# Patient Record
Sex: FEMALE | Race: BLACK OR AFRICAN AMERICAN | NOT HISPANIC OR LATINO | Employment: FULL TIME | URBAN - NONMETROPOLITAN AREA
[De-identification: names, ages, dates, MRNs, and addresses within clinical notes are randomized per-mention and may not be internally consistent; named-entity substitution may affect disease eponyms.]

---

## 2022-07-18 ENCOUNTER — OFFICE VISIT (OUTPATIENT)
Dept: CARDIOLOGY | Facility: CLINIC | Age: 37
End: 2022-07-18

## 2022-07-18 VITALS
DIASTOLIC BLOOD PRESSURE: 80 MMHG | WEIGHT: 263.6 LBS | HEART RATE: 60 BPM | SYSTOLIC BLOOD PRESSURE: 110 MMHG | HEIGHT: 63 IN | BODY MASS INDEX: 46.71 KG/M2

## 2022-07-18 DIAGNOSIS — Z79.01 LONG TERM (CURRENT) USE OF ANTICOAGULANTS: ICD-10-CM

## 2022-07-18 DIAGNOSIS — I82.4Y2 DEEP VEIN THROMBOSIS (DVT) OF PROXIMAL VEIN OF LEFT LOWER EXTREMITY, UNSPECIFIED CHRONICITY: Primary | ICD-10-CM

## 2022-07-18 DIAGNOSIS — R01.1 HEART MURMUR: ICD-10-CM

## 2022-07-18 DIAGNOSIS — I83.812 VARICOSE VEINS OF LEFT LOWER EXTREMITY WITH PAIN: ICD-10-CM

## 2022-07-18 DIAGNOSIS — I82.5Y2 CHRONIC DEEP VEIN THROMBOSIS (DVT) OF PROXIMAL VEIN OF LEFT LOWER EXTREMITY: ICD-10-CM

## 2022-07-18 DIAGNOSIS — G43.009 MIGRAINE WITHOUT AURA AND WITHOUT STATUS MIGRAINOSUS, NOT INTRACTABLE: ICD-10-CM

## 2022-07-18 DIAGNOSIS — I87.2 VENOUS INSUFFICIENCY: ICD-10-CM

## 2022-07-18 PROBLEM — I82.509 CHRONIC DEEP VEIN THROMBOSIS (DVT): Status: ACTIVE | Noted: 2022-07-18

## 2022-07-18 PROBLEM — I82.509 CHRONIC DEEP VEIN THROMBOSIS (DVT): Status: RESOLVED | Noted: 2022-07-18 | Resolved: 2022-07-18

## 2022-07-18 PROCEDURE — 99204 OFFICE O/P NEW MOD 45 MIN: CPT | Performed by: NURSE PRACTITIONER

## 2022-07-18 PROCEDURE — 93000 ELECTROCARDIOGRAM COMPLETE: CPT | Performed by: NURSE PRACTITIONER

## 2022-07-18 RX ORDER — HYDROXYZINE HYDROCHLORIDE 25 MG/1
25 TABLET, FILM COATED ORAL 3 TIMES DAILY PRN
COMMUNITY
End: 2022-12-14 | Stop reason: SDUPTHER

## 2022-07-18 RX ORDER — CHOLECALCIFEROL (VITAMIN D3) 125 MCG
CAPSULE ORAL DAILY
COMMUNITY
End: 2023-01-24

## 2022-07-18 RX ORDER — METRONIDAZOLE 10 MG/G
GEL TOPICAL 2 TIMES DAILY
COMMUNITY

## 2022-07-18 RX ORDER — IBUPROFEN 800 MG/1
800 TABLET ORAL 2 TIMES DAILY PRN
COMMUNITY

## 2022-07-18 RX ORDER — LORATADINE 10 MG/1
10 TABLET ORAL DAILY
COMMUNITY

## 2022-07-18 RX ORDER — TRETINOIN 0.25 MG/G
GEL TOPICAL NIGHTLY
COMMUNITY

## 2022-07-18 RX ORDER — TOPIRAMATE 25 MG/1
25 TABLET ORAL DAILY
COMMUNITY

## 2022-07-18 RX ORDER — ESCITALOPRAM OXALATE 10 MG/1
10 TABLET ORAL DAILY
COMMUNITY

## 2022-07-18 RX ORDER — CYCLOBENZAPRINE HCL 10 MG
10 TABLET ORAL 2 TIMES DAILY PRN
COMMUNITY

## 2022-07-18 NOTE — PROGRESS NOTES
"Subjective     Chief Complaint   Patient presents with   • Establish Care     Patient referred per PCP for hx of DVT on Eliquis. No prior cardiac testing.   • DVT     Has hx of DVT in left leg after having partial hysterectomy.   • Edema     Has occasional in legs, has hx of varicose veins. She reports having treatment in 2014.   • Migraines     Has increased over the last few years. She is currently taking Topiramate.     Initial cardiac evaluation;    [facility]  Beverly Giron is a 37-year-old female with history of varicose veins, venous insufficiency, significant LE edema with pregnancies (x5) who is referred for cardiac evaluation after developing thrombus of the greater saphenous vein after undergoing hysterectomy in February 2021.  She was treated with Eliquis with interval ultrasounds showing thrombus resolved on 5/23/2022.  She wants to know whether Eliquis can be discontinued.    Past history positive for vein stripping when she lived in [place].  Data deficient.  Complains of \"terrible looking legs, varicose veins\".  No previous history of DVT.  Unclear whether clotting disorder in family members but her grandmother did have DVT.  She denies chest pain, dyspnea, orthopnea, palpitations, TIA.  She complains of weight gain, edema, fatigue.  She has used compression socks in the past but not currently.  She works from home.  She has chronic migraine headache relieved with Topamax.  She complains of progressive regular weight gain over the last 5 years.    Family history positive for HTN, MI, heart disease in mother, aunt, uncle, grandmother.  She quit smoking 4 months ago.  Rarely drinks alcohol.    Cardiac History  Past Surgical History:   Procedure Laterality Date   • US VENOUS EXTREMITY UNILATERAL  05/23/2022    Normal, no DVT   • US VENOUS EXTREMITY UNILATERAL  02/10/2022    Partially occlusive superficial thrombophlebitis greater saphenous vein   • US VENOUS EXTREMITY UNILATERAL  09/29/2021    Small " amount residual thrombus greater saphenous vein   • US VENOUS EXTREMITY UNILATERAL  2021    Superficial thrombosis greater saphenous vein   • US VENOUS EXTREMITY UNILATERAL  03/10/2021    Occlusive extensive thrombus within the greater saphenous vein from upper calf throughout the thigh     Current Outpatient Medications   Medication Sig Dispense Refill   • apixaban (ELIQUIS) 5 MG tablet tablet Take 5 mg by mouth Daily.     • Cholecalciferol (Vitamin D3) 50 MCG (2000 UT) tablet Take  by mouth Daily.     • cyclobenzaprine (FLEXERIL) 10 MG tablet Take 10 mg by mouth 2 (Two) Times a Day As Needed for Muscle Spasms.     • escitalopram (LEXAPRO) 10 MG tablet Take 10 mg by mouth Daily.     • hydrOXYzine (ATARAX) 25 MG tablet Take 25 mg by mouth 3 (Three) Times a Day As Needed for Itching.     • ibuprofen (ADVIL,MOTRIN) 800 MG tablet Take 800 mg by mouth 2 (Two) Times a Day As Needed for Mild Pain .     • loratadine (CLARITIN) 10 MG tablet Take 10 mg by mouth Daily.     • metroNIDAZOLE (METROGEL) 1 % gel Apply  topically to the appropriate area as directed 2 (Two) Times a Day.     • topiramate (TOPAMAX) 25 MG tablet Take 25 mg by mouth Daily.     • tretinoin (Retin-A) 0.025 % gel Apply  topically to the appropriate area as directed Every Night.       No current facility-administered medications for this visit.     Patient has no known allergies.    Past Medical History:   Diagnosis Date   • Clotting disorder (HCC)    • Deep vein thrombosis (HCC)    • History of partial hysterectomy    • Hx of eye surgery    • Hx of migraines    • Hx of ovarian cyst      Social History     Socioeconomic History   • Marital status:    Tobacco Use   • Smoking status: Former Smoker     Packs/day: 1.00     Years: 10.00     Pack years: 10.00     Types: Cigarettes     Quit date: 2022     Years since quittin.2   • Smokeless tobacco: Never Used   • Tobacco comment: she had stopped in 2017 for a few years then restarted  "10/2021. Has stopped again 4/2022.   Vaping Use   • Vaping Use: Never used   Substance and Sexual Activity   • Alcohol use: Yes     Comment: occasionally, about once monthly   • Drug use: Never   • Sexual activity: Defer     Family History   Problem Relation Age of Onset   • Hypertension Mother    • COPD Mother    • Scoliosis Sister    • No Known Problems Sister    • Heart attack Maternal Aunt 60   • Hypertension Maternal Aunt    • Hypertension Maternal Aunt    • Heart attack Maternal Uncle 59   • Heart attack Maternal Grandmother    • Heart disease Maternal Grandmother    • Cancer Maternal Grandmother    • Liver disease Maternal Grandfather    • Seizures Maternal Grandfather    • No Known Problems Daughter    • No Known Problems Daughter    • No Known Problems Daughter    • No Known Problems Son    • No Known Problems Son      Review of Systems   Constitutional: Positive for fatigue and unexpected weight change (Weight is increasing).   HENT: Negative.    Eyes: Negative.    Respiratory: Negative.    Cardiovascular: Positive for leg swelling.   Gastrointestinal: Negative.    Endocrine: Negative.    Genitourinary: Negative.    Musculoskeletal: Negative.    Skin: Negative.    Allergic/Immunologic: Negative.    Hematological: Negative.    Psychiatric/Behavioral: Negative.        Diabetes- No  Thyroid- normal    Objective     /80 (BP Location: Right arm)   Pulse 60   Ht 160 cm (63\")   Wt 120 kg (263 lb 9.6 oz)   BMI 46.69 kg/m²     Labs 5/6/2022: A1c 5.3%, H/H13.5/40, platelets 319, vitamin D 29, TSH 1.24, total cholesterol 158, TRI 46, HDL 44, , CMP normal    Physical Exam  Vitals and nursing note reviewed.   Constitutional:       Appearance: Normal appearance. She is obese.   HENT:      Head: Normocephalic and atraumatic.   Eyes:      General: No scleral icterus.     Extraocular Movements: Extraocular movements intact.      Pupils: Pupils are equal, round, and reactive to light.   Neck:      Vascular: " No carotid bruit.   Cardiovascular:      Rate and Rhythm: Normal rate and regular rhythm.   Pulmonary:      Effort: Pulmonary effort is normal.      Breath sounds: Normal breath sounds.   Abdominal:      General: Bowel sounds are normal.      Palpations: Abdomen is soft.   Musculoskeletal:         General: Normal range of motion.      Cervical back: Normal range of motion and neck supple.      Right lower leg: Edema (Trace) present.      Left lower leg: Edema (Trace) present.   Skin:     General: Skin is warm and dry.      Capillary Refill: Capillary refill takes less than 2 seconds.   Neurological:      General: No focal deficit present.      Mental Status: She is alert and oriented to person, place, and time.   Psychiatric:         Mood and Affect: Mood normal.         Behavior: Behavior normal.         ECG 12 Lead    Date/Time: 7/18/2022 11:31 AM  Performed by: Livia Salvador APRN  Authorized by: Livia Salvador APRN   Comparison: not compared with previous ECG   Rhythm: sinus rhythm  Rate: normal  BPM: 60  ST Segments: ST segments normal    Clinical impression: normal ECG  Comments:  ms  QRS 97 ms   ms          Assessment & Plan     Diagnoses and all orders for this visit:    1. Acute deep vein thrombosis (DVT) of proximal vein of left lower extremity (HCC) (Primary)  -     Adult Transthoracic Echo Complete W/ Cont if Necessary Per Protocol; Future    2. Long term (current) use of anticoagulants  -     Ambulatory Referral to Hematology  -     Adult Transthoracic Echo Complete W/ Cont if Necessary Per Protocol; Future    3. Migraine without aura and without status migrainosus, not intractable  -     Adult Transthoracic Echo Complete W/ Cont if Necessary Per Protocol; Future    4. Venous insufficiency  -     Adult Transthoracic Echo Complete W/ Cont if Necessary Per Protocol; Future    5. Chronic deep vein thrombosis (DVT) of proximal vein of left lower extremity (HCC)  -     Ambulatory Referral to  Hematology  -     Adult Transthoracic Echo Complete W/ Cont if Necessary Per Protocol; Future    6. Heart murmur  -     Adult Transthoracic Echo Complete W/ Cont if Necessary Per Protocol; Future    Other orders  -     ECG 12 Lead       Clinical exam reveals normal S1, S2, soft holosystolic murmur at the mitral area 1-2/6.  EKG showed sinus rhythm at 60 bpm, trace LE edema, LCTA bilaterally.  Blood pressure normal.  BMI elevated at 46.  Labs reviewed showing normal glucose, normal lipids.    Chronic venous insufficiency/varicose veins-patient will be referred to Dr. Gian Anderson for evaluation of venous insufficiency and history of vein stripping with recent thrombus.  Compression and elevation advised.  Information sheet on minimizing venous pressure given.  Weight loss.  Low-sodium diet.    Questionable clotting disorder- patient will be referred to Dr. Hall/hematology to rule out genetic clotting disorder with personal history and family history of DVT.    DVT-resolved after 15 months of Eliquis therapy.  Will continue Eliquis until she is seen by hematology.  She appears to be taking 5 mg once daily, unsure why dose was reduced.    Murmur/LE edema- echocardiogram to evaluate LVEF, mitral and aortic valves, PA pressure.  Bubble study will be done with history of chronic migraine headache.    Advised to continue Eliquis until she is evaluated by hematology.    Mediterranean diet sheet given.  Further recommendations to follow echo.  No changes made today.

## 2022-07-26 ENCOUNTER — TELEPHONE (OUTPATIENT)
Dept: CARDIOLOGY | Facility: CLINIC | Age: 37
End: 2022-07-26

## 2022-08-02 ENCOUNTER — HOSPITAL ENCOUNTER (OUTPATIENT)
Dept: CARDIOLOGY | Facility: HOSPITAL | Age: 37
Discharge: HOME | End: 2022-08-02

## 2022-08-02 DIAGNOSIS — I82.4Y2 DEEP VEIN THROMBOSIS (DVT) OF PROXIMAL VEIN OF LEFT LOWER EXTREMITY, UNSPECIFIED CHRONICITY: ICD-10-CM

## 2022-08-02 DIAGNOSIS — I87.2 VENOUS INSUFFICIENCY: ICD-10-CM

## 2022-08-02 DIAGNOSIS — G43.009 MIGRAINE WITHOUT AURA AND WITHOUT STATUS MIGRAINOSUS, NOT INTRACTABLE: ICD-10-CM

## 2022-08-02 DIAGNOSIS — I82.5Y2 CHRONIC DEEP VEIN THROMBOSIS (DVT) OF PROXIMAL VEIN OF LEFT LOWER EXTREMITY: ICD-10-CM

## 2022-08-02 DIAGNOSIS — Z79.01 LONG TERM (CURRENT) USE OF ANTICOAGULANTS: ICD-10-CM

## 2022-08-02 DIAGNOSIS — R01.1 HEART MURMUR: ICD-10-CM

## 2022-08-02 LAB
BH CV ECHO MEAS - ACS: 2.18 CM
BH CV ECHO MEAS - AO MAX PG: 6.8 MMHG
BH CV ECHO MEAS - AO MEAN PG: 4 MMHG
BH CV ECHO MEAS - AO ROOT DIAM: 2.9 CM
BH CV ECHO MEAS - AO V2 MAX: 130.3 CM/SEC
BH CV ECHO MEAS - AO V2 VTI: 32.6 CM
BH CV ECHO MEAS - EDV(CUBED): 85.8 ML
BH CV ECHO MEAS - EDV(MOD-SP4): 85.3 ML
BH CV ECHO MEAS - EF(MOD-SP4): 55.6 %
BH CV ECHO MEAS - EF_3D-VOL: 64 %
BH CV ECHO MEAS - ESV(CUBED): 15.8 ML
BH CV ECHO MEAS - ESV(MOD-SP4): 37.9 ML
BH CV ECHO MEAS - FS: 43.1 %
BH CV ECHO MEAS - IVS/LVPW: 1.11 CM
BH CV ECHO MEAS - IVSD: 1.17 CM
BH CV ECHO MEAS - LA DIMENSION: 4.2 CM
BH CV ECHO MEAS - LAT PEAK E' VEL: 10.2 CM/SEC
BH CV ECHO MEAS - LV DIASTOLIC VOL/BSA (35-75): 39.3 CM2
BH CV ECHO MEAS - LV MASS(C)D: 171.7 GRAMS
BH CV ECHO MEAS - LV SYSTOLIC VOL/BSA (12-30): 17.4 CM2
BH CV ECHO MEAS - LVIDD: 4.4 CM
BH CV ECHO MEAS - LVIDS: 2.5 CM
BH CV ECHO MEAS - LVOT AREA: 4.1 CM2
BH CV ECHO MEAS - LVOT DIAM: 2.28 CM
BH CV ECHO MEAS - LVPWD: 1.05 CM
BH CV ECHO MEAS - MED PEAK E' VEL: 8.7 CM/SEC
BH CV ECHO MEAS - MV A MAX VEL: 57.4 CM/SEC
BH CV ECHO MEAS - MV DEC SLOPE: 461.8 CM/SEC2
BH CV ECHO MEAS - MV E MAX VEL: 94.7 CM/SEC
BH CV ECHO MEAS - MV E/A: 1.65
BH CV ECHO MEAS - RVDD: 3.1 CM
BH CV ECHO MEAS - SI(MOD-SP4): 21.8 ML/M2
BH CV ECHO MEAS - SV(MOD-SP4): 47.4 ML
BH CV ECHO MEASUREMENTS AVERAGE E/E' RATIO: 10.02
LEFT ATRIUM VOLUME INDEX: 19.2 ML/M2
MAXIMAL PREDICTED HEART RATE: 185 BPM
STRESS TARGET HR: 157 BPM

## 2022-08-02 PROCEDURE — 93306 TTE W/DOPPLER COMPLETE: CPT

## 2022-08-02 PROCEDURE — 93306 TTE W/DOPPLER COMPLETE: CPT | Performed by: INTERNAL MEDICINE

## 2022-12-14 ENCOUNTER — OFFICE VISIT (OUTPATIENT)
Dept: BEHAVIORAL HEALTH | Facility: CLINIC | Age: 37
End: 2022-12-14

## 2022-12-14 ENCOUNTER — OFFICE VISIT (OUTPATIENT)
Dept: BARIATRICS/WEIGHT MGMT | Facility: CLINIC | Age: 37
End: 2022-12-14

## 2022-12-14 VITALS
BODY MASS INDEX: 45.55 KG/M2 | RESPIRATION RATE: 16 BRPM | HEIGHT: 65 IN | OXYGEN SATURATION: 99 % | HEART RATE: 98 BPM | WEIGHT: 273.4 LBS | TEMPERATURE: 97.7 F | SYSTOLIC BLOOD PRESSURE: 126 MMHG | DIASTOLIC BLOOD PRESSURE: 80 MMHG

## 2022-12-14 DIAGNOSIS — R53.83 FATIGUE, UNSPECIFIED TYPE: ICD-10-CM

## 2022-12-14 DIAGNOSIS — F41.9 RECURRENT ANXIETY: ICD-10-CM

## 2022-12-14 DIAGNOSIS — Z79.01 LONG TERM (CURRENT) USE OF ANTICOAGULANTS: ICD-10-CM

## 2022-12-14 DIAGNOSIS — Z71.89 ENCOUNTER FOR PSYCHOLOGICAL ASSESSMENT PRIOR TO BARIATRIC SURGERY: ICD-10-CM

## 2022-12-14 DIAGNOSIS — Z87.891 FORMER SMOKER: ICD-10-CM

## 2022-12-14 DIAGNOSIS — E66.01 MORBID OBESITY WITH BMI OF 45.0-49.9, ADULT: Primary | ICD-10-CM

## 2022-12-14 DIAGNOSIS — R12 HEARTBURN: ICD-10-CM

## 2022-12-14 DIAGNOSIS — E66.01 OBESITY, CLASS III, BMI 40-49.9 (MORBID OBESITY): Primary | ICD-10-CM

## 2022-12-14 PROBLEM — F32.A ANXIETY AND DEPRESSION: Status: ACTIVE | Noted: 2022-12-14

## 2022-12-14 PROBLEM — I82.409 DEEP VEIN THROMBOSIS: Status: ACTIVE | Noted: 2022-12-14

## 2022-12-14 PROCEDURE — 90791 PSYCH DIAGNOSTIC EVALUATION: CPT | Performed by: PSYCHOLOGIST

## 2022-12-14 PROCEDURE — 99204 OFFICE O/P NEW MOD 45 MIN: CPT | Performed by: PHYSICIAN ASSISTANT

## 2022-12-14 RX ORDER — MULTIPLE VITAMINS W/ MINERALS TAB 9MG-400MCG
1 TAB ORAL DAILY
COMMUNITY

## 2022-12-14 RX ORDER — CHOLECALCIFEROL (VITAMIN D3) 1250 MCG
CAPSULE ORAL
COMMUNITY
Start: 2022-11-23 | End: 2023-01-24

## 2022-12-14 RX ORDER — HYDROXYZINE PAMOATE 25 MG/1
25 CAPSULE ORAL 3 TIMES DAILY PRN
COMMUNITY
Start: 2022-11-23

## 2022-12-14 NOTE — PROGRESS NOTES
[facility] BARIATRIC SURGERY  [address]  [address]  [facility] [address]  [phone]      Patient  Name:  Beverly Giorn  :  1985      Date of Visit: 2022      Chief Complaint:  weight gain; unable to maintain weight loss      History of Present Illness:  Beverly Giron is a 37 y.o. female who presents today for evaluation, education and consultation regarding metabolic and bariatric surgery with Dr. Canada.     Beverly has been overweight for at least 20+ years, has been 35 pounds or more overweight for at least 20 years, has been 100 pounds or more overweight for 4 or more years and started dieting at age 35.  Previous diet attempts include: Low Carbohydrate and Fasting; Weight Watchers; None.  The most weight Beverly lost was 30 pounds but was unable to maintain that weight loss.  Her maximum lifetime weight is 273 pounds.      GI: Episodic heartburn with certain foods.  Treated with Tums as needed.  No prior eval or history of H. pylori.  She denies any postprandial nausea or abdominal pain.  Abdominal surgeries include  section x3 via lower transverse incision and laparoscopic partial hysterectomy.    She has a history of a DVT in her lower extremity following her partial hysterectomy in .  She has been evaluated by hematology and has had a negative work-up.  It was felt to be provoked.  Most recent lower extremity duplex showed complete resolution of DVT in May 2022.  She remains on Eliquis 5 mg daily and reports her hematologist told her remaining on anticoagulation was optional.  She had a cardiology work-up following her DVT with an echo with normal LVEF and mild mitral regurgitation.    Other past medical history includes anxiety/depression, migraines, low back pain treated with as needed NSAIDs, carpal tunnel syndrome.    She is a former smoker and quit in 2022.  Her  still smokes, but not inside.     Complete history has  been obtained and discussed today, as pertinent to metabolic/ bariatric surgery.     Past Medical History:   Diagnosis Date   • Anxiety and depression    • Carpal tunnel syndrome    • Genital herpes    • Heartburn     Tums PRN; no hx of EGD or h pylori   • History of DVT of lower extremity     following partial hysterectomy- believed to be provoked. Following with heme/onc   • History of partial hysterectomy    • HPV (human papilloma virus) infection    • Hx of eye surgery    • Hx of ovarian cyst    • Low back pain     no steroids. PRN NSAIDs   • Migraines     PRN flexeril and nsaids     Past Surgical History:   Procedure Laterality Date   •  SECTION  2009    x3; , , 2018; lower transverse   • ECHO - CONVERTED  2022    EF 60%. Trace -Mild MR. LA- 4.2   • EYE SURGERY     • SUBTOTAL HYSTERECTOMY      laparoscopic; still has ovaries   • TUBAL ABDOMINAL LIGATION     • US VENOUS EXTREMITY UNILATERAL  2022    Normal, no DVT   • US VENOUS EXTREMITY UNILATERAL  02/10/2022    Partially occlusive superficial thrombophlebitis greater saphenous vein   • US VENOUS EXTREMITY UNILATERAL  2021    Small amount residual thrombus greater saphenous vein   • US VENOUS EXTREMITY UNILATERAL  2021    Superficial thrombosis greater saphenous vein   • US VENOUS EXTREMITY UNILATERAL  03/10/2021    Occlusive extensive thrombus within the greater saphenous vein from upper calf throughout the thigh       No Known Allergies    Current Outpatient Medications:   •  apixaban (ELIQUIS) 5 MG tablet tablet, Take 5 mg by mouth Daily., Disp: , Rfl:   •  Cholecalciferol (Vitamin D3) 50 MCG (2000) tablet, Take  by mouth Daily., Disp: , Rfl:   •  cyclobenzaprine (FLEXERIL) 10 MG tablet, Take 10 mg by mouth 2 (Two) Times a Day As Needed for Muscle Spasms., Disp: , Rfl:   •  escitalopram (LEXAPRO) 10 MG tablet, Take 10 mg by mouth Daily., Disp: , Rfl:   •  hydrOXYzine pamoate (VISTARIL) 25 MG capsule, ,  Disp: , Rfl:   •  ibuprofen (ADVIL,MOTRIN) 800 MG tablet, Take 800 mg by mouth 2 (Two) Times a Day As Needed for Mild Pain ., Disp: , Rfl:   •  loratadine (CLARITIN) 10 MG tablet, Take 10 mg by mouth Daily., Disp: , Rfl:   •  metroNIDAZOLE (METROGEL) 1 % gel, Apply  topically to the appropriate area as directed 2 (Two) Times a Day., Disp: , Rfl:   •  multivitamin with minerals tablet tablet, Take 1 tablet by mouth Daily., Disp: , Rfl:   •  topiramate (TOPAMAX) 25 MG tablet, Take 25 mg by mouth Daily., Disp: , Rfl:   •  tretinoin (RETIN-A) 0.025 % gel, Apply  topically to the appropriate area as directed Every Night., Disp: , Rfl:   •  Cholecalciferol (Vitamin D3) 1.25 MG (49999 UT) capsule, , Disp: , Rfl:     Social History     Socioeconomic History   • Marital status:    Tobacco Use   • Smoking status: Former     Packs/day: 1.00     Years: 10.00     Pack years: 10.00     Types: Cigarettes     Quit date: 2022     Years since quittin.7   • Smokeless tobacco: Never   • Tobacco comments:     she had stopped in  for a few years then restarted 10/2021. Has stopped again 2022.   Vaping Use   • Vaping Use: Never used   Substance and Sexual Activity   • Alcohol use: Yes     Comment: occasionally, about once monthly   • Drug use: Never   • Sexual activity: Defer     Social History     Social History Narrative    Patient is from [place], [place]. She is  with 5 children. She works full time at [employer] as a ..       Family History   Problem Relation Age of Onset   • Hypertension Mother    • COPD Mother    • Sleep apnea Mother    • No Known Problems Father    • Scoliosis Sister    • No Known Problems Sister    • No Known Problems Sister    • Heart attack Maternal Grandmother    • Heart disease Maternal Grandmother    • Cancer Maternal Grandmother    • Liver disease Maternal Grandfather    • Seizures Maternal Grandfather    • Stroke Maternal Grandfather    • No Known Problems Paternal  Grandmother    • No Known Problems Daughter    • No Known Problems Daughter    • No Known Problems Daughter    • No Known Problems Son    • No Known Problems Son    • Heart attack Maternal Aunt 60   • Hypertension Maternal Aunt    • Hypertension Maternal Aunt    • Heart attack Maternal Uncle 59       Review of Systems:  Constitutional:  reports fatigue, weight gain and denies fevers, chills.  HEENT:  denies headache, ear pain or loss of hearing, blurred or double vision, nasal discharge or sore throat.  Cardiovascular:  reports hx DVT and denies HTN, HLD, CAD, Atrial Fib, hx heart disease, heart murmur, hx MI, chest pain, palpitations, edema.  Respiratory:  reports none and denies dyspnea on exertion, shortness of breath , cough , wheezing, sleep apnea, asthma, COPD, hx PE.  Gastrointestinal:  reports heartburn and denies dysphagia, nausea, vomiting, abdominal pain, IBS, diarrhea, constipation, melena, blood in stool, gallbladder issues, liver disease, hx pancreatitis.  Genitourinary:  reports none and denies history of  frequent UTI, incontinence, hematuria, dysuria, polyuria, polydipsia, renal insufficiency, renal failure.    Musculoskeletal:  reports back pain and denies joint pain, fibromyalgia, arthritis and autoimmune disease.  Neurological:  reports migraines, numbness /tingling and denies headaches, dizziness, confusion, seizure, stroke.  Psychiatric:  reports hx depression, hx anxiety and denies depressed mood, feeling anxious, bipolar disorder, suicidal ideation, hx suicide attempt, hx self injury, hx substance abuse, eating disorder.  Endocrine:  reports none and denies PCOS, endometriosis, glucose intolerance, diabetes, thyroid disease, gout.  Hematologic:  reports none and denies bruising, bleeding disorder, hx anemia, hx blood transfusion.  Skin:  reports none and denies rashes, hx MRSA.    Physical Exam:  Vital Signs:  Weight: 124 kg (273 lb 6.4 oz)   Body mass index is 46.2 kg/m².  Temp: 97.7 °F  (36.5 °C)   Heart Rate: 98   BP: 126/80     Physical Exam  Constitutional:       Appearance: She is obese.   HENT:      Head: Normocephalic and atraumatic.   Eyes:      Extraocular Movements: Extraocular movements intact.      Conjunctiva/sclera: Conjunctivae normal.   Cardiovascular:      Rate and Rhythm: Normal rate and regular rhythm.      Heart sounds: Murmur heard.   Pulmonary:      Effort: Pulmonary effort is normal.      Breath sounds: Normal breath sounds.   Abdominal:      General: Bowel sounds are normal. There is no distension.      Palpations: Abdomen is soft. There is no mass.      Tenderness: There is no abdominal tenderness.      Comments: Lower transverse scar from prior .  Old lap scars from partial hysterectomy   Musculoskeletal:         General: Normal range of motion.      Cervical back: Normal range of motion and neck supple.   Skin:     General: Skin is warm and dry.   Neurological:      General: No focal deficit present.      Mental Status: She is alert and oriented to person, place, and time.   Psychiatric:         Mood and Affect: Mood normal.         Behavior: Behavior normal.         Thought Content: Thought content normal.         Judgment: Judgment normal.         Patient Active Problem List   Diagnosis   • Migraine without aura and without status migrainosus, not intractable   • Long term (current) use of anticoagulants   • Venous insufficiency   • Varicose veins of left lower extremity with pain   • Deep vein thrombosis (HCC)   • Heartburn   • Anxiety and depression       Assessment:  37 y.o. female with medically complicated obesity pursuing sleeve gastrectomy.    Metabolic & Bariatric Surgery is deemed medically necessary given the following: Class 3 Severe Obesity (BMI >=40). Obesity-related health conditions include the following: GERD and lower extremity venous stasis disease. Obesity is worsening. BMI is is above average; BMI management plan is completed. We discussed  consulting a Bariatric surgeon.        Plan:  Further evaluation will include: CBC, CMP, Lipids, TSH, HgA1C, H.Pylori UBT, EKG, CXR, Pulmonary Function Testing, Nicotine Testing and EGD.    The risks and benefits of the upper endoscopy were discussed with the patient in detail and all questions were answered.  Possibility of perforation, bleeding, aspiration, and anesthesia reaction were reviewed.  Patient agrees to proceed.      Additional clearances needed prior to surgery will include: Cardiology and Hematology.     Patient understands that bariatric surgery is not cosmetic surgery but rather a tool to help make a lifelong commitment to lifestyle changes including diet, exercise and behavior modifications.  The patient has been educated today on those expected postoperative lifestyle changes.  Psychological and Nutritional consultations will be completed prior to surgery.  Instructions on how to access [employer] (an internet based site w/ educational surgical videos) were given to the patient.  Recommended perioperative vitamin supplementation was reviewed.  The importance of avoiding ASA/ NSAIDS/ steroids/ tobacco/nicotine/ hormones/ immunomodulators perioperatively was discussed in detail.  All questions/concerns have been addressed.      Further input to follow pending the above.           TIM Gibbs      ADDENDUM:     Dr. Hall (Heme/Onc) has recommended bridging with Lovenox prior to surgery. Letter scanned in Epic.

## 2022-12-14 NOTE — PROGRESS NOTES
PROGRESS NOTE    Data:    Beverly Giron is a 37 y.o. female who met with the undersigned for a scheduled psychological evaluation from 1:45 - 2:30pm.      Clinical Maneuvering/Intervention:      Chief complaint and history of presenting illness/Problems: struggling with obesity for several years. Despite trying different weight loss plans and diets, the pt reported being unsuccessful in losing weight. A psychological evaluation was conducted in order to assess past and current level of functioning. Areas assessed included, but were not limited to: perception of social support, perception of ability to face and deal with challenges in life (positive functioning), anxiety symptoms, depressive symptoms, perspective on beliefs/belief system, coping skills for stress, intelligence level, addiction issues, etc. Therapeutic rapport was established. Interventions conducted today were geared towards assessing the pt's readiness for weight loss surgery and identifying and psychological contraindications for undergoing such a major life change. Social support was deemed strong (specific to weight loss surgery/weight loss in this manner and in a general sense): , children, friends. 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 Current psychological struggles were described as low, but included anxiety from time to time and has felt progressively frustrated/defeated from being overweight. Coping skills for distress and related to undergoing a major life change such as weight loss surgery/weight loss were deemed strong and included good sense of humor, follows directions well, responsible person, maintains quality relationships with others, and believes in herself that she will be successful with weight loss surgery. The pt endorsed having characteristics of readiness to undergo major life changes inherent in the journey of  weight loss surgery. She could speak to having 'suffered enough,' and the decision to have weight loss surgery has 'clicked' for her. The pt expressed gratitude for today's visit.     Past Family and Social History:      History of family mental health problems: none endorsed    Psychosocial history: treatment of psychiatric care in the past (several years of individual counseling), alcohol/substance abuse treatment in the past (N/A) , alcohol/substance abuse problems (N/A), inpatient psychiatric care (N/A).    Mental Status Exam (MSE):  Hygiene:  good  Dress: normal  Attitude:  cooperative and proactive  Motor Activity: normal  Speech: normal  Mood:   hopeful   Affect:  congruent  Thought Processes: normal  Thought Content:  normal  Suicidal Thoughts:  not endorsed  Homicidal Thoughts:  not endorsed  Crisis Safety Plan: not needed   Hallucinations:  none      Patient's Support Network Includes:  family, friends      Progress toward goal: there is evidence to suggest that she is taking measures to improve the quality of her life including seeking weight loss surgery.       Functional Status: high      Prognosis: good with weight loss surgery    Evaluation, Diagnoses, and Ability/Capacity to Respond to Treatment:      The pt presented to be struggling with recurrent anxiety and obesity (BMI = 46.20, morbid obesity). Results of MSE demonstrated a functional status of high. Strengths: belief in self that she will be successful with weight loss surgery, etc (see detailed list of coping skills above). Needed for growth (CPT code requirement for Weaknesses): weight loss.      From a psychological standpoint, the pt presents as a very good candidate for bariatric surgery. She is motivated for the surgery, has showed readiness for the lifestyle change in terms of starting to adjust her eating habits, and seems to have appropriate expectations of how to prepare and how to live after surgery in order to lose weight  successfully.    Treatment Plan:      Short term goals: Start improving her health by following up with her bariatric surgeon in order to receive weight loss surgery as soon as feasible/appropriate and demonstrate success with compliance to adhering to the recommended diet. Long term goals: reach a healthy weight and alleviation of anxiety via taking control over her health.    Marissa Antonio, PhD, LP

## 2022-12-15 ENCOUNTER — DOCUMENTATION (OUTPATIENT)
Dept: BARIATRICS/WEIGHT MGMT | Facility: CLINIC | Age: 37
End: 2022-12-15

## 2022-12-15 LAB
ALBUMIN SERPL-MCNC: 4.2 G/DL (ref 3.8–4.8)
ALBUMIN/GLOB SERPL: 1.5 {RATIO} (ref 1.2–2.2)
ALP SERPL-CCNC: 57 IU/L (ref 44–121)
ALT SERPL-CCNC: 13 IU/L (ref 0–32)
AST SERPL-CCNC: 16 IU/L (ref 0–40)
BASOPHILS # BLD AUTO: 0.1 X10E3/UL (ref 0–0.2)
BASOPHILS NFR BLD AUTO: 1 %
BILIRUB SERPL-MCNC: 0.2 MG/DL (ref 0–1.2)
BUN SERPL-MCNC: 11 MG/DL (ref 6–20)
BUN/CREAT SERPL: 12 (ref 9–23)
CALCIUM SERPL-MCNC: 9.4 MG/DL (ref 8.7–10.2)
CHLORIDE SERPL-SCNC: 103 MMOL/L (ref 96–106)
CHOLEST SERPL-MCNC: 199 MG/DL (ref 100–199)
CO2 SERPL-SCNC: 25 MMOL/L (ref 20–29)
CREAT SERPL-MCNC: 0.92 MG/DL (ref 0.57–1)
EGFRCR SERPLBLD CKD-EPI 2021: 83 ML/MIN/1.73
EOSINOPHIL # BLD AUTO: 0.3 X10E3/UL (ref 0–0.4)
EOSINOPHIL NFR BLD AUTO: 3 %
ERYTHROCYTE [DISTWIDTH] IN BLOOD BY AUTOMATED COUNT: 12.9 % (ref 11.7–15.4)
GLOBULIN SER CALC-MCNC: 2.8 G/DL (ref 1.5–4.5)
GLUCOSE SERPL-MCNC: 86 MG/DL (ref 70–99)
HBA1C MFR BLD: 5.3 % (ref 4.8–5.6)
HCT VFR BLD AUTO: 43.1 % (ref 34–46.6)
HDLC SERPL-MCNC: 52 MG/DL
HGB BLD-MCNC: 14.3 G/DL (ref 11.1–15.9)
IMM GRANULOCYTES # BLD AUTO: 0 X10E3/UL (ref 0–0.1)
IMM GRANULOCYTES NFR BLD AUTO: 0 %
LDLC SERPL CALC-MCNC: 128 MG/DL (ref 0–99)
LYMPHOCYTES # BLD AUTO: 3.5 X10E3/UL (ref 0.7–3.1)
LYMPHOCYTES NFR BLD AUTO: 37 %
MCH RBC QN AUTO: 29.4 PG (ref 26.6–33)
MCHC RBC AUTO-ENTMCNC: 33.2 G/DL (ref 31.5–35.7)
MCV RBC AUTO: 89 FL (ref 79–97)
MONOCYTES # BLD AUTO: 0.5 X10E3/UL (ref 0.1–0.9)
MONOCYTES NFR BLD AUTO: 6 %
NEUTROPHILS # BLD AUTO: 5 X10E3/UL (ref 1.4–7)
NEUTROPHILS NFR BLD AUTO: 53 %
PLATELET # BLD AUTO: 363 X10E3/UL (ref 150–450)
POTASSIUM SERPL-SCNC: 4.5 MMOL/L (ref 3.5–5.2)
PROT SERPL-MCNC: 7 G/DL (ref 6–8.5)
RBC # BLD AUTO: 4.86 X10E6/UL (ref 3.77–5.28)
SODIUM SERPL-SCNC: 141 MMOL/L (ref 134–144)
TRIGL SERPL-MCNC: 105 MG/DL (ref 0–149)
TSH SERPL DL<=0.005 MIU/L-ACNC: 1.04 UIU/ML (ref 0.45–4.5)
UREA BREATH TEST QL: NEGATIVE
VLDLC SERPL CALC-MCNC: 19 MG/DL (ref 5–40)
WBC # BLD AUTO: 9.4 X10E3/UL (ref 3.4–10.8)

## 2022-12-15 NOTE — PROGRESS NOTES
Bariatric Nutrition Consult     Name: Beverly Giron   : 1985   AGE: 37 y.o.   MRN: [id number]      Consult Date: 2022     Surgery desired: sleeve    Height: 163.8cm                Current weight: 273lbs                    BMI: 46.20    Highest weight: current                           Lowest weight: 165lbs    Goals: 165lbs, to establish a healthy eating plan, be healthy        Past Medical History:   Diagnosis Date   • Anxiety and depression    • Carpal tunnel syndrome    • Genital herpes    • Heartburn     Tums PRN; no hx of EGD or h pylori   • History of DVT of lower extremity     following partial hysterectomy- believed to be provoked. Following with heme/onc   • History of partial hysterectomy    • HPV (human papilloma virus) infection    • Hx of eye surgery    • Hx of ovarian cyst    • Low back pain     no steroids. PRN NSAIDs   • Migraines     PRN flexeril and nsaids                                 Diet history reveals 2 meals and 2 snacks daily, high in fat and carbs, large portions    Breakfast: skips    Lunch:  1c schwab beans, 1 cup emeterio rice    Dinner: 2 slices pepperoni pizza    Snacks: 10 cookies/8 oz egg nog and 1/4 c billie clarke/ cereal/emeterio rice    Eating out: 3-: Wings and onion rings/beef broccoli rice/ lo mein    Protein sources: chicken, fish, turkey, meat, eggs    Drinks: coffee with creamer and milk, water     Food allergies/intolerances: none    Night eating: no    Patient has/has not been diagnosed with an eating disorder: no    Exercise/activity: no    Main bariatric nutrition principles discussed and explained. Patient needs to focus on 100g protein daily, 100-140g carbohydrates daily, healthy fat intake, 64 oz fluid daily, no carbonation, and try protein drinks/protein powders. Avoid high fructose corn syrup. Patient verbalized understanding and queries were answered.  Additional nutritional counseling will be available      Deborah Dozier RD,SEBASTIÁN

## 2023-01-10 ENCOUNTER — TELEMEDICINE (OUTPATIENT)
Dept: BARIATRICS/WEIGHT MGMT | Facility: CLINIC | Age: 38
End: 2023-01-10
Payer: MEDICAID

## 2023-01-10 DIAGNOSIS — R12 HEARTBURN: ICD-10-CM

## 2023-01-10 DIAGNOSIS — E66.01 OBESITY, CLASS III, BMI 40-49.9 (MORBID OBESITY): Primary | ICD-10-CM

## 2023-01-10 PROCEDURE — 99214 OFFICE O/P EST MOD 30 MIN: CPT | Performed by: PHYSICIAN ASSISTANT

## 2023-01-10 RX ORDER — TRIAMCINOLONE ACETONIDE 0.25 MG/G
1 CREAM TOPICAL 2 TIMES DAILY
COMMUNITY

## 2023-01-10 NOTE — PROGRESS NOTES
[facility] Bariatric Surgery  [address]  [address]  [facility] [id number] [phone]    This visit was conducted as a video visit, in an effort to limit spread of the novel coronavirus during the COVID-19 pandemic.  The patient gave consent.      Patient Name:  Beverly Giron  :  1985      Date of Visit: 01/10/2023      Reason for Visit:   Heartburn    HPI: Beverly Giron is a 37 y.o. female pursuing MBS with Dr. Canada    Per initial intake 2022:    \"Beverly has been overweight for at least 20+ years, has been 35 pounds or more overweight for at least 20 years, has been 100 pounds or more overweight for 4 or more years and started dieting at age 35.  Previous diet attempts include: Low Carbohydrate and Fasting; Weight Watchers; None.  The most weight Beverly lost was 30 pounds but was unable to maintain that weight loss.  Her maximum lifetime weight is 273 pounds.       GI: Episodic heartburn with certain foods.  Treated with Tums as needed.  No prior eval or history of H. pylori.  She denies any postprandial nausea or abdominal pain.  Abdominal surgeries include  section x3 via lower transverse incision and laparoscopic partial hysterectomy.     She has a history of a DVT in her lower extremity following her partial hysterectomy in .  She has been evaluated by hematology and has not had a negative work-up.  It was felt to be provoked.  Most recent lower extremity duplex showed complete resolution of DVT.  She remains on Eliquis 5 mg daily and reports her hematologist told her remaining on anticoagulation was optional.  She had a cardiology work-up following her DVT with an echo with normal LVEF and mild mitral regurgitation.     Other past medical history includes anxiety/depression, migraines, low back pain treated with as needed NSAIDs, carpal tunnel syndrome.     She is a former smoker and quit in 2022.  Her  still smokes, but not  inside.\"    Today's update:    Patient has been doing well with no significant medical changes or hospitalizations.  She is eager to proceed with her EGD.  She denies any abdominal pain, nausea, vomiting, fevers, shortness of breath.    Past Medical History:   Diagnosis Date   • Anxiety and depression    • Carpal tunnel syndrome    • Genital herpes    • Heartburn     Tums PRN; no hx of EGD or h pylori   • History of DVT of lower extremity     following partial hysterectomy- believed to be provoked. Following with heme/onc   • History of partial hysterectomy    • HPV (human papilloma virus) infection    • Hx of eye surgery    • Hx of ovarian cyst    • Low back pain     no steroids. PRN NSAIDs   • Migraines     PRN flexeril and nsaids     Past Surgical History:   Procedure Laterality Date   •  SECTION  2009    x3; , , 2018; lower transverse   • ECHO - CONVERTED  2022    EF 60%. Trace -Mild MR. GUTIÉRREZ- 4.2   • EYE SURGERY     • SUBTOTAL HYSTERECTOMY      laparoscopic; still has ovaries   • TUBAL ABDOMINAL LIGATION     • US VENOUS EXTREMITY UNILATERAL  2022    Normal, no DVT   • US VENOUS EXTREMITY UNILATERAL  02/10/2022    Partially occlusive superficial thrombophlebitis greater saphenous vein   • US VENOUS EXTREMITY UNILATERAL  2021    Small amount residual thrombus greater saphenous vein   • US VENOUS EXTREMITY UNILATERAL  2021    Superficial thrombosis greater saphenous vein   • US VENOUS EXTREMITY UNILATERAL  03/10/2021    Occlusive extensive thrombus within the greater saphenous vein from upper calf throughout the thigh     Outpatient Medications Marked as Taking for the 1/10/23 encounter (Telemedicine) with Alexsandra Ruiz PA   Medication Sig Dispense Refill   • apixaban (ELIQUIS) 5 MG tablet tablet Take 5 mg by mouth Daily.     • Cholecalciferol (Vitamin D3) 1.25 MG (94010 UT) capsule      • cyclobenzaprine (FLEXERIL) 10 MG tablet Take 10 mg by mouth 2 (Two) Times  a Day As Needed for Muscle Spasms.     • escitalopram (LEXAPRO) 10 MG tablet Take 10 mg by mouth Daily.     • hydrOXYzine pamoate (VISTARIL) 25 MG capsule      • ibuprofen (ADVIL,MOTRIN) 800 MG tablet Take 800 mg by mouth 2 (Two) Times a Day As Needed for Mild Pain .     • loratadine (CLARITIN) 10 MG tablet Take 10 mg by mouth Daily.     • metroNIDAZOLE (METROGEL) 1 % gel Apply  topically to the appropriate area as directed 2 (Two) Times a Day.     • multivitamin with minerals tablet tablet Take 1 tablet by mouth Daily.     • topiramate (TOPAMAX) 25 MG tablet Take 25 mg by mouth Daily.     • tretinoin (RETIN-A) 0.025 % gel Apply  topically to the appropriate area as directed Every Night.     • triamcinolone (KENALOG) 0.025 % cream Apply 1 application topically to the appropriate area as directed 2 (Two) Times a Day.         No Known Allergies    Social History     Socioeconomic History   • Marital status:    Tobacco Use   • Smoking status: Former     Packs/day: 1.00     Years: 10.00     Pack years: 10.00     Types: Cigarettes     Quit date: 2022     Years since quittin.7   • Smokeless tobacco: Never   • Tobacco comments:     she had stopped in 2017 for a few years then restarted 10/2021. Has stopped again 2022.   Vaping Use   • Vaping Use: Never used   Substance and Sexual Activity   • Alcohol use: Yes     Comment: occasionally, about once monthly   • Drug use: Never   • Sexual activity: Defer     Social History     Social History Narrative    Patient is from [place], [place]. She is  with 5 children. She works full time at [employer] as a ..       Review of Systems   General: + weight gain, fatigue. Denies fever, chills, unintentional weight loss   HEENT: Denies nasal congestion, change in vision, ear pain, change in hearing, soar throat   CARDIAC: Denies chest pain, palpitations, edema   RESP: denies shortness of breath, cough, wheezing   GI: + heartburn. Denies abdominal pain,  nausea, vomiting, diarrhea, constipation, reflux   Urinary: Denies polyuria, dysuria, hematuria   MSK: denies joint pain, swelling, gout, joint stiffness   Neuro: Denies seizures, weakness, numbness/tingling, LOC   Heme/Endo: denies bleeding, bruising, heat/cold intolerance, sweating   Psych: denies depression, anxiety    There were no vitals taken for this visit.    Physical Exam  Constitutional:       General: She is not in acute distress.  Pulmonary:      Effort: Pulmonary effort is normal.   Neurological:      Mental Status: She is alert and oriented to person, place, and time.   Psychiatric:         Mood and Affect: Mood normal.         Behavior: Behavior normal.         Thought Content: Thought content normal.         Judgment: Judgment normal.           Assessment:      ICD-10-CM ICD-9-CM   1. Obesity, Class III, BMI 40-49.9 (morbid obesity) ([facility])  E66.01 278.01   2. Heartburn  R12 787.1       Class 3 Severe Obesity (BMI >=40). Obesity-related health conditions include the following: as above . Obesity is worsening. BMI is is above average; BMI management plan is completed. We discussed consulting a Bariatric surgeon.      Plan:  Will proceed with EGD. The risks and benefits of the upper endoscopy were discussed with the patient in detail and all questions were answered.  Possibility of perforation, bleeding, aspiration, and anesthesia reaction were reviewed.  Patient agrees to proceed.

## 2023-01-16 ENCOUNTER — OUTSIDE FACILITY SERVICE (OUTPATIENT)
Dept: BARIATRICS/WEIGHT MGMT | Facility: CLINIC | Age: 38
End: 2023-01-16
Payer: MEDICAID

## 2023-01-16 ENCOUNTER — LAB REQUISITION (OUTPATIENT)
Dept: LAB | Facility: HOSPITAL | Age: 38
End: 2023-01-16
Payer: MEDICAID

## 2023-01-16 DIAGNOSIS — R12 HEARTBURN: ICD-10-CM

## 2023-01-16 PROCEDURE — 88305 TISSUE EXAM BY PATHOLOGIST: CPT

## 2023-01-16 PROCEDURE — 43239 EGD BIOPSY SINGLE/MULTIPLE: CPT | Performed by: SURGERY

## 2023-01-17 LAB — REF LAB TEST METHOD: NORMAL

## 2023-01-19 DIAGNOSIS — R12 HEARTBURN: ICD-10-CM

## 2023-01-24 ENCOUNTER — OFFICE VISIT (OUTPATIENT)
Dept: CARDIOLOGY | Facility: CLINIC | Age: 38
End: 2023-01-24
Payer: MEDICAID

## 2023-01-24 VITALS
DIASTOLIC BLOOD PRESSURE: 90 MMHG | WEIGHT: 283.2 LBS | BODY MASS INDEX: 48.35 KG/M2 | HEIGHT: 64 IN | HEART RATE: 96 BPM | SYSTOLIC BLOOD PRESSURE: 124 MMHG

## 2023-01-24 DIAGNOSIS — I83.812 VARICOSE VEINS OF LEFT LOWER EXTREMITY WITH PAIN: Primary | ICD-10-CM

## 2023-01-24 DIAGNOSIS — I87.2 VENOUS INSUFFICIENCY: ICD-10-CM

## 2023-01-24 DIAGNOSIS — Z79.01 LONG TERM (CURRENT) USE OF ANTICOAGULANTS: ICD-10-CM

## 2023-01-24 PROCEDURE — 99213 OFFICE O/P EST LOW 20 MIN: CPT | Performed by: NURSE PRACTITIONER

## 2023-01-24 RX ORDER — DOCUSATE SODIUM 250 MG
250 CAPSULE ORAL DAILY PRN
COMMUNITY

## 2023-01-24 RX ORDER — MULTIVIT-MIN/IRON/FOLIC ACID/K 18-600-40
CAPSULE ORAL DAILY
COMMUNITY

## 2023-01-24 NOTE — LETTER
January 28, 2023     ROBIN Augustin  [address]  [facility] [address]    Patient: Beverly Giron   YOB: 1985   Date of Visit: 1/24/2023       Dear ROBIN Augustin    Beverly Giron was in my office today. Below is a copy of my note.    If you have questions, please do not hesitate to call me. I look forward to following Beverly along with you.         Sincerely,        ROBIN Riley        CC: No Recipients    Chief Complaint   Patient presents with   • Follow-up     Cardiac management   • Lab     Last labs in chart.   • Cardiac clearance     Needing clearance for weight loss surgery.   • Med Refill     PCP writes refills.     Subjective       Beverly Giron is a 37 y.o. female with history of varicose veins, venous insufficiency, significant LE edema with pregnancies (x5) referred for cardiac evaluation after developing thrombus of the greater saphenous vein after undergoing hysterectomy in February 2021.  She was treated with Eliquis with interval ultrasounds showing thrombus finally resolved after 15 months on 5/23/2022.  She was referred here to see if she could stop Eliquis. She denied cardiac symptoms other than fatigue and LE edema, weight gain. Echo showed normal LVEF, no valvular pathology. She was referred to hematology for coagulopathy work up which was negative. Recommended continued Eliquis, Lovenox bridging for future surgical procedures since she had persistent thrombus on 5/2022.     She returns today for follow up. Continues Eliquis without bleeding. Planning to have bariatric surgery once clearance is obtained. No CP, SOB. Persistent LE edema, varicosity. She was referred to Dr. Anderson for venous evaluation but apparently did not return for follow up/procedure.       Cardiac History:    Past Surgical History:   Procedure Laterality Date   • ECHO - CONVERTED  08/02/2022    EF 60%. Trace -Mild MR. LA- 4.2   • SUBTOTAL HYSTERECTOMY      laparoscopic; still  has ovaries   • US VENOUS EXTREMITY UNILATERAL  05/23/2022    Normal, no DVT   • US VENOUS EXTREMITY UNILATERAL  02/10/2022    Partially occlusive superficial thrombophlebitis greater saphenous vein   • US VENOUS EXTREMITY UNILATERAL  09/29/2021    Small amount residual thrombus greater saphenous vein   • US VENOUS EXTREMITY UNILATERAL  04/21/2021    Superficial thrombosis greater saphenous vein   • US VENOUS EXTREMITY UNILATERAL  03/10/2021    Occlusive extensive thrombus within the greater saphenous vein from upper calf throughout the thigh     Current Outpatient Medications   Medication Sig Dispense Refill   • apixaban (ELIQUIS) 5 MG tablet tablet Take 5 mg by mouth Daily.     • cyclobenzaprine (FLEXERIL) 10 MG tablet Take 10 mg by mouth 2 (Two) Times a Day As Needed for Muscle Spasms.     • docusate sodium (COLACE) 250 MG capsule Take 250 mg by mouth Daily As Needed for Constipation.     • escitalopram (LEXAPRO) 10 MG tablet Take 10 mg by mouth Daily.     • hydrOXYzine pamoate (VISTARIL) 25 MG capsule Take 25 mg by mouth 3 (Three) Times a Day As Needed.     • ibuprofen (ADVIL,MOTRIN) 800 MG tablet Take 800 mg by mouth 2 (Two) Times a Day As Needed for Mild Pain .     • loratadine (CLARITIN) 10 MG tablet Take 10 mg by mouth Daily.     • metroNIDAZOLE (METROGEL) 1 % gel Apply  topically to the appropriate area as directed 2 (Two) Times a Day.     • multivitamin with minerals tablet tablet Take 1 tablet by mouth Daily.     • topiramate (TOPAMAX) 25 MG tablet Take 25 mg by mouth Daily.     • tretinoin (RETIN-A) 0.025 % gel Apply  topically to the appropriate area as directed Every Night.     • triamcinolone (KENALOG) 0.025 % cream Apply 1 application topically to the appropriate area as directed 2 (Two) Times a Day.     • Vitamin D, Cholecalciferol, 50 MCG (2000 UT) capsule Take  by mouth Daily.       No current facility-administered medications for this visit.     Patient has no known allergies.    Past Medical  History:   Diagnosis Date   • Anxiety and depression    • Carpal tunnel syndrome    • Genital herpes    • Heartburn     Tums PRN; no hx of EGD or h pylori   • History of DVT of lower extremity     following partial hysterectomy- believed to be provoked. Following with heme/onc   • History of partial hysterectomy    • HPV (human papilloma virus) infection    • Hx of eye surgery    • Hx of ovarian cyst    • Low back pain     no steroids. PRN NSAIDs   • Migraines     PRN flexeril and nsaids     Social History     Socioeconomic History   • Marital status:    Tobacco Use   • Smoking status: Former     Packs/day: 1.00     Years: 10.00     Pack years: 10.00     Types: Cigarettes     Quit date: 2022     Years since quittin.8   • Smokeless tobacco: Never   • Tobacco comments:     she had stopped in  for a few years then restarted 10/2021. Has stopped again 2022.   Vaping Use   • Vaping Use: Never used   Substance and Sexual Activity   • Alcohol use: Yes     Comment: occasionally   • Drug use: Never   • Sexual activity: Defer     Family History   Problem Relation Age of Onset   • Hypertension Mother    • COPD Mother    • Sleep apnea Mother    • No Known Problems Father    • Scoliosis Sister    • No Known Problems Sister    • No Known Problems Sister    • Heart attack Maternal Grandmother    • Heart disease Maternal Grandmother    • Cancer Maternal Grandmother    • Liver disease Maternal Grandfather    • Seizures Maternal Grandfather    • Stroke Maternal Grandfather    • No Known Problems Paternal Grandmother    • No Known Problems Daughter    • No Known Problems Daughter    • No Known Problems Daughter    • No Known Problems Son    • No Known Problems Son    • Heart attack Maternal Aunt 60   • Hypertension Maternal Aunt    • Hypertension Maternal Aunt    • Heart attack Maternal Uncle 59     Review of Systems   Constitutional: Positive for malaise/fatigue and weight gain (+10). Negative for decreased  "appetite.   HENT: Negative.    Eyes: Negative for blurred vision.   Cardiovascular: Positive for leg swelling. Negative for chest pain, dyspnea on exertion, palpitations and syncope.   Respiratory: Negative for shortness of breath and sleep disturbances due to breathing.    Endocrine: Negative.    Hematologic/Lymphatic: Negative for bleeding problem. Does not bruise/bleed easily.   Skin: Negative.    Musculoskeletal: Negative for falls and myalgias.   Gastrointestinal: Negative for abdominal pain, heartburn and melena.   Genitourinary: Negative for hematuria.   Neurological: Negative for dizziness and light-headedness.   Psychiatric/Behavioral: Negative for altered mental status.   Allergic/Immunologic: Negative.       Objective      /90 (BP Location: Right arm)   Pulse 96   Ht 163.8 cm (64.49\")   Wt 128 kg (283 lb 3.2 oz)   BMI 47.88 kg/m²     Vitals and nursing note reviewed.   Constitutional:       General: Not in acute distress.     Appearance: Well-developed. Not diaphoretic.   Eyes:      Pupils: Pupils are equal, round, and reactive to light.   HENT:      Head: Normocephalic.   Pulmonary:      Effort: Pulmonary effort is normal. No respiratory distress.      Breath sounds: Normal breath sounds.   Cardiovascular:      Normal rate. Regular rhythm.   Pulses:     Intact distal pulses.   Edema:     Peripheral edema absent.   Abdominal:      General: Bowel sounds are normal.      Palpations: Abdomen is soft.   Musculoskeletal: Normal range of motion.      Cervical back: Normal range of motion. Skin:     General: Skin is warm and dry.   Neurological:      Mental Status: Alert and oriented to person, place, and time.        Procedures         Problem List Items Addressed This Visit        Cardiac and Vasculature    Venous insufficiency    Varicose veins of left lower extremity with pain - Primary       Coag and Thromboembolic    Long term (current) use of anticoagulants      1. CVI/varicose veins- advised to " fu with vein specialist, Dr. Anderson. Compression, elevation. Weight loss.  Low Na diet.     2. Hematology work up rule out coagulopathy. Recommendation to continue Eliquis due to persistent thrombus while on Eliquis. Recommended Lovenox bridging perioperatively. Patient advised of the same.      3. DVT-resolved after 15 months of Eliquis therapy.  Continue maintenance Eliquis.      4. Cardiac status appears stable. Echo reviewed with her. Normal LVEF, no valvular pathology.     5. Advised to monitor blood pressure. If persistently elevated, may benefit with low dose HCTZ.     Class 3 Severe Obesity (BMI >=40). Obesity-related health conditions include the following: obstructive sleep apnea, hypertension, coronary heart disease, diabetes mellitus, dyslipidemias and GERD. Obesity is worsening. BMI is is above average; BMI management plan is completed. We discussed portion control and increasing exercise.     Beverly Giron  reports that she quit smoking about 9 months ago. Her smoking use included cigarettes. She has a 10.00 pack-year smoking history. She has never used smokeless tobacco.           Electronically signed by ROBIN Riley,  January 28, 2023 20:21 EST

## 2023-01-26 ENCOUNTER — HOSPITAL ENCOUNTER (OUTPATIENT)
Dept: PULMONOLOGY | Facility: HOSPITAL | Age: 38
Discharge: HOME | End: 2023-01-26
Admitting: PHYSICIAN ASSISTANT
Payer: MEDICAID

## 2023-01-26 DIAGNOSIS — E66.01 OBESITY, CLASS III, BMI 40-49.9 (MORBID OBESITY): ICD-10-CM

## 2023-01-26 DIAGNOSIS — R53.83 FATIGUE, UNSPECIFIED TYPE: ICD-10-CM

## 2023-01-26 DIAGNOSIS — R12 HEARTBURN: ICD-10-CM

## 2023-01-26 DIAGNOSIS — Z79.01 LONG TERM (CURRENT) USE OF ANTICOAGULANTS: ICD-10-CM

## 2023-01-26 DIAGNOSIS — Z87.891 FORMER SMOKER: ICD-10-CM

## 2023-01-26 PROCEDURE — 94010 BREATHING CAPACITY TEST: CPT | Performed by: INTERNAL MEDICINE

## 2023-01-26 PROCEDURE — 94727 GAS DIL/WSHOT DETER LNG VOL: CPT | Performed by: INTERNAL MEDICINE

## 2023-01-26 PROCEDURE — 94729 DIFFUSING CAPACITY: CPT

## 2023-01-26 PROCEDURE — 94727 GAS DIL/WSHOT DETER LNG VOL: CPT

## 2023-01-26 PROCEDURE — 94010 BREATHING CAPACITY TEST: CPT

## 2023-01-26 PROCEDURE — 94729 DIFFUSING CAPACITY: CPT | Performed by: INTERNAL MEDICINE

## 2023-01-29 NOTE — PROGRESS NOTES
Chief Complaint   Patient presents with   • Follow-up     Cardiac management   • Lab     Last labs in chart.   • Cardiac clearance     Needing clearance for weight loss surgery.   • Med Refill     PCP writes refills.     Subjective       Beverly Giron is a 37 y.o. female with history of varicose veins, venous insufficiency, significant LE edema with pregnancies (x5) referred for cardiac evaluation after developing thrombus of the greater saphenous vein after undergoing hysterectomy in February 2021.  She was treated with Eliquis with interval ultrasounds showing thrombus finally resolved after 15 months on 5/23/2022.  She was referred here to see if she could stop Eliquis. She denied cardiac symptoms other than fatigue and LE edema, weight gain. Echo showed normal LVEF, no valvular pathology. She was referred to hematology for coagulopathy work up which was negative. Recommended continued Eliquis, Lovenox bridging for future surgical procedures since she had persistent thrombus on 5/2022.     She returns today for follow up. Continues Eliquis without bleeding. Planning to have bariatric surgery once clearance is obtained. No CP, SOB. Persistent LE edema, varicosity. She was referred to Dr. Anderson for venous evaluation but apparently did not return for follow up/procedure.        Cardiac History:    Past Surgical History:   Procedure Laterality Date   • ECHO - CONVERTED  08/02/2022    EF 60%. Trace -Mild MR. LA- 4.2   • SUBTOTAL HYSTERECTOMY      laparoscopic; still has ovaries   • US VENOUS EXTREMITY UNILATERAL  05/23/2022    Normal, no DVT   • US VENOUS EXTREMITY UNILATERAL  02/10/2022    Partially occlusive superficial thrombophlebitis greater saphenous vein   • US VENOUS EXTREMITY UNILATERAL  09/29/2021    Small amount residual thrombus greater saphenous vein   • US VENOUS EXTREMITY UNILATERAL  04/21/2021    Superficial thrombosis greater saphenous vein   • US VENOUS EXTREMITY UNILATERAL  03/10/2021     Occlusive extensive thrombus within the greater saphenous vein from upper calf throughout the thigh     Current Outpatient Medications   Medication Sig Dispense Refill   • apixaban (ELIQUIS) 5 MG tablet tablet Take 5 mg by mouth Daily.     • cyclobenzaprine (FLEXERIL) 10 MG tablet Take 10 mg by mouth 2 (Two) Times a Day As Needed for Muscle Spasms.     • docusate sodium (COLACE) 250 MG capsule Take 250 mg by mouth Daily As Needed for Constipation.     • escitalopram (LEXAPRO) 10 MG tablet Take 10 mg by mouth Daily.     • hydrOXYzine pamoate (VISTARIL) 25 MG capsule Take 25 mg by mouth 3 (Three) Times a Day As Needed.     • ibuprofen (ADVIL,MOTRIN) 800 MG tablet Take 800 mg by mouth 2 (Two) Times a Day As Needed for Mild Pain .     • loratadine (CLARITIN) 10 MG tablet Take 10 mg by mouth Daily.     • metroNIDAZOLE (METROGEL) 1 % gel Apply  topically to the appropriate area as directed 2 (Two) Times a Day.     • multivitamin with minerals tablet tablet Take 1 tablet by mouth Daily.     • topiramate (TOPAMAX) 25 MG tablet Take 25 mg by mouth Daily.     • tretinoin (RETIN-A) 0.025 % gel Apply  topically to the appropriate area as directed Every Night.     • triamcinolone (KENALOG) 0.025 % cream Apply 1 application topically to the appropriate area as directed 2 (Two) Times a Day.     • Vitamin D, Cholecalciferol, 50 MCG (2000 UT) capsule Take  by mouth Daily.       No current facility-administered medications for this visit.     Patient has no known allergies.    Past Medical History:   Diagnosis Date   • Anxiety and depression    • Carpal tunnel syndrome    • Genital herpes    • Heartburn     Tums PRN; no hx of EGD or h pylori   • History of DVT of lower extremity     following partial hysterectomy- believed to be provoked. Following with heme/onc   • History of partial hysterectomy 2021   • HPV (human papilloma virus) infection    • Hx of eye surgery    • Hx of ovarian cyst    • Low back pain     no steroids. PRN  NSAIDs   • Migraines     PRN flexeril and nsaids     Social History     Socioeconomic History   • Marital status:    Tobacco Use   • Smoking status: Former     Packs/day: 1.00     Years: 10.00     Pack years: 10.00     Types: Cigarettes     Quit date: 2022     Years since quittin.8   • Smokeless tobacco: Never   • Tobacco comments:     she had stopped in  for a few years then restarted 10/2021. Has stopped again 2022.   Vaping Use   • Vaping Use: Never used   Substance and Sexual Activity   • Alcohol use: Yes     Comment: occasionally   • Drug use: Never   • Sexual activity: Defer     Family History   Problem Relation Age of Onset   • Hypertension Mother    • COPD Mother    • Sleep apnea Mother    • No Known Problems Father    • Scoliosis Sister    • No Known Problems Sister    • No Known Problems Sister    • Heart attack Maternal Grandmother    • Heart disease Maternal Grandmother    • Cancer Maternal Grandmother    • Liver disease Maternal Grandfather    • Seizures Maternal Grandfather    • Stroke Maternal Grandfather    • No Known Problems Paternal Grandmother    • No Known Problems Daughter    • No Known Problems Daughter    • No Known Problems Daughter    • No Known Problems Son    • No Known Problems Son    • Heart attack Maternal Aunt 60   • Hypertension Maternal Aunt    • Hypertension Maternal Aunt    • Heart attack Maternal Uncle 59     Review of Systems   Constitutional: Positive for malaise/fatigue and weight gain (+10). Negative for decreased appetite.   HENT: Negative.    Eyes: Negative for blurred vision.   Cardiovascular: Positive for leg swelling. Negative for chest pain, dyspnea on exertion, palpitations and syncope.   Respiratory: Negative for shortness of breath and sleep disturbances due to breathing.    Endocrine: Negative.    Hematologic/Lymphatic: Negative for bleeding problem. Does not bruise/bleed easily.   Skin: Negative.    Musculoskeletal: Negative for falls and  "myalgias.   Gastrointestinal: Negative for abdominal pain, heartburn and melena.   Genitourinary: Negative for hematuria.   Neurological: Negative for dizziness and light-headedness.   Psychiatric/Behavioral: Negative for altered mental status.   Allergic/Immunologic: Negative.       Objective     /90 (BP Location: Right arm)   Pulse 96   Ht 163.8 cm (64.49\")   Wt 128 kg (283 lb 3.2 oz)   BMI 47.88 kg/m²     Vitals and nursing note reviewed.   Constitutional:       General: Not in acute distress.     Appearance: Well-developed. Not diaphoretic.   Eyes:      Pupils: Pupils are equal, round, and reactive to light.   HENT:      Head: Normocephalic.   Pulmonary:      Effort: Pulmonary effort is normal. No respiratory distress.      Breath sounds: Normal breath sounds.   Cardiovascular:      Normal rate. Regular rhythm.   Pulses:     Intact distal pulses.   Edema:     Peripheral edema absent.   Abdominal:      General: Bowel sounds are normal.      Palpations: Abdomen is soft.   Musculoskeletal: Normal range of motion.      Cervical back: Normal range of motion. Skin:     General: Skin is warm and dry.   Neurological:      Mental Status: Alert and oriented to person, place, and time.        Procedures          Problem List Items Addressed This Visit        Cardiac and Vasculature    Venous insufficiency    Varicose veins of left lower extremity with pain - Primary       Coag and Thromboembolic    Long term (current) use of anticoagulants      1. CVI/varicose veins- advised to fu with vein specialist, Dr. Anderson. Compression, elevation. Weight loss.  Low Na diet.     2. Hematology work up rule out coagulopathy. Recommendation to continue Eliquis due to persistent thrombus while on Eliquis. Recommended Lovenox bridging perioperatively. Patient advised of the same.      3. DVT-resolved after 15 months of Eliquis therapy.  Continue maintenance Eliquis.      4. Cardiac status appears stable. Echo reviewed with her. " Normal LVEF, no valvular pathology.     5. Advised to monitor blood pressure. If persistently elevated, may benefit with low dose HCTZ.     Class 3 Severe Obesity (BMI >=40). Obesity-related health conditions include the following: obstructive sleep apnea, hypertension, coronary heart disease, diabetes mellitus, dyslipidemias and GERD. Obesity is worsening. BMI is is above average; BMI management plan is completed. We discussed portion control and increasing exercise.     Beverly Giron  reports that she quit smoking about 9 months ago. Her smoking use included cigarettes. She has a 10.00 pack-year smoking history. She has never used smokeless tobacco.            Electronically signed by ROBIN Riley,  January 28, 2023 20:21 EST

## 2023-02-01 DIAGNOSIS — Z87.891 FORMER SMOKER: ICD-10-CM

## 2023-02-01 DIAGNOSIS — R53.83 FATIGUE, UNSPECIFIED TYPE: Primary | ICD-10-CM

## 2023-02-09 ENCOUNTER — TELEPHONE (OUTPATIENT)
Dept: CARDIOLOGY | Facility: CLINIC | Age: 38
End: 2023-02-09
Payer: MEDICAID

## 2023-02-09 NOTE — TELEPHONE ENCOUNTER
Advised pt that she needs to call the lab at our diagnostic center to see if they actually do that testing. She said she had already called and is waiting on a call back.

## 2023-02-09 NOTE — TELEPHONE ENCOUNTER
Caller: Beverly Giron    Relationship: Self    Best call back number: [phone]    What is the best time to reach you: ANY    Who are you requesting to speak with (clinical staff, provider,  specific staff member): CLINICAL      What was the call regarding: PT HAS A NICOTINE ORDER PLACED BY HER BARIATRIC SURGEON, STATED SHE CAN GO TO ANY [facility] TO HAVE DONE. SHE WOULD LIKE TO KNOW IF ANY OFFICES WITHIN THE AREA CAN OFFER THAT FOR HER SO SHE DOESN'T HAVE TO DRIVE TO [place]. THANK YOU.     Do you require a callback: YES

## 2023-02-16 ENCOUNTER — LAB (OUTPATIENT)
Dept: LAB | Facility: HOSPITAL | Age: 38
End: 2023-02-16
Payer: MEDICAID

## 2023-02-16 DIAGNOSIS — Z87.891 FORMER SMOKER: ICD-10-CM

## 2023-02-16 PROCEDURE — G0480 DRUG TEST DEF 1-7 CLASSES: HCPCS | Performed by: PHYSICIAN ASSISTANT

## 2023-02-23 LAB
COTININE SERPL-MCNC: <1 NG/ML
NICOTINE SERPL-MCNC: <1 NG/ML

## 2023-04-05 DIAGNOSIS — R06.00 DYSPNEA, UNSPECIFIED TYPE: ICD-10-CM

## 2023-04-05 DIAGNOSIS — R53.83 FATIGUE, UNSPECIFIED TYPE: Primary | ICD-10-CM

## 2023-04-10 ENCOUNTER — LAB (OUTPATIENT)
Dept: LAB | Facility: HOSPITAL | Age: 38
End: 2023-04-10
Payer: MEDICAID

## 2023-04-10 ENCOUNTER — HOSPITAL ENCOUNTER (OUTPATIENT)
Dept: GENERAL RADIOLOGY | Facility: HOSPITAL | Age: 38
Discharge: HOME | End: 2023-04-10
Payer: MEDICAID

## 2023-04-10 DIAGNOSIS — R06.00 DYSPNEA, UNSPECIFIED TYPE: ICD-10-CM

## 2023-04-10 DIAGNOSIS — R53.83 FATIGUE, UNSPECIFIED TYPE: ICD-10-CM

## 2023-04-10 LAB
ALBUMIN SERPL-MCNC: 3.9 G/DL (ref 3.5–5.2)
ALBUMIN/GLOB SERPL: 1.3 G/DL
ALP SERPL-CCNC: 54 U/L (ref 39–117)
ALT SERPL W P-5'-P-CCNC: 10 U/L (ref 1–33)
ANION GAP SERPL CALCULATED.3IONS-SCNC: 8 MMOL/L (ref 5–15)
AST SERPL-CCNC: 18 U/L (ref 1–32)
BILIRUB SERPL-MCNC: 0.3 MG/DL (ref 0–1.2)
BUN SERPL-MCNC: 11 MG/DL (ref 6–20)
BUN/CREAT SERPL: 12.9 (ref 7–25)
CALCIUM SPEC-SCNC: 8.7 MG/DL (ref 8.6–10.5)
CHLORIDE SERPL-SCNC: 107 MMOL/L (ref 98–107)
CO2 SERPL-SCNC: 24 MMOL/L (ref 22–29)
CREAT SERPL-MCNC: 0.85 MG/DL (ref 0.57–1)
DEPRECATED RDW RBC AUTO: 41.9 FL (ref 37–54)
EGFRCR SERPLBLD CKD-EPI 2021: 91.8 ML/MIN/1.73
ERYTHROCYTE [DISTWIDTH] IN BLOOD BY AUTOMATED COUNT: 13.2 % (ref 12.3–15.4)
GLOBULIN UR ELPH-MCNC: 3 GM/DL
GLUCOSE SERPL-MCNC: 84 MG/DL (ref 65–99)
HCT VFR BLD AUTO: 42.4 % (ref 34–46.6)
HGB BLD-MCNC: 14.3 G/DL (ref 12–15.9)
MCH RBC QN AUTO: 29.7 PG (ref 26.6–33)
MCHC RBC AUTO-ENTMCNC: 33.7 G/DL (ref 31.5–35.7)
MCV RBC AUTO: 88 FL (ref 79–97)
PLATELET # BLD AUTO: 323 10*3/MM3 (ref 140–450)
PMV BLD AUTO: 9.6 FL (ref 6–12)
POTASSIUM SERPL-SCNC: 4.3 MMOL/L (ref 3.5–5.2)
PROT SERPL-MCNC: 6.9 G/DL (ref 6–8.5)
RBC # BLD AUTO: 4.82 10*6/MM3 (ref 3.77–5.28)
SODIUM SERPL-SCNC: 139 MMOL/L (ref 136–145)
WBC NRBC COR # BLD: 8.36 10*3/MM3 (ref 3.4–10.8)

## 2023-04-10 PROCEDURE — 36415 COLL VENOUS BLD VENIPUNCTURE: CPT

## 2023-04-10 PROCEDURE — 71046 X-RAY EXAM CHEST 2 VIEWS: CPT

## 2023-04-10 PROCEDURE — 85027 COMPLETE CBC AUTOMATED: CPT

## 2023-04-10 PROCEDURE — 80053 COMPREHEN METABOLIC PANEL: CPT

## 2023-04-18 ENCOUNTER — CONSULT (OUTPATIENT)
Dept: BARIATRICS/WEIGHT MGMT | Facility: CLINIC | Age: 38
End: 2023-04-18
Payer: MEDICAID

## 2023-04-18 VITALS
HEART RATE: 100 BPM | OXYGEN SATURATION: 97 % | HEIGHT: 65 IN | RESPIRATION RATE: 16 BRPM | SYSTOLIC BLOOD PRESSURE: 118 MMHG | TEMPERATURE: 97.8 F | BODY MASS INDEX: 46.73 KG/M2 | WEIGHT: 280.5 LBS | DIASTOLIC BLOOD PRESSURE: 76 MMHG

## 2023-04-18 DIAGNOSIS — E66.01 MORBID OBESITY WITH BODY MASS INDEX OF 45.0-49.9 IN ADULT: Primary | ICD-10-CM

## 2023-04-18 DIAGNOSIS — K21.9 HIATAL HERNIA WITH GASTROESOPHAGEAL REFLUX: ICD-10-CM

## 2023-04-18 DIAGNOSIS — K44.9 HIATAL HERNIA WITH GASTROESOPHAGEAL REFLUX: ICD-10-CM

## 2023-04-18 PROCEDURE — 99214 OFFICE O/P EST MOD 30 MIN: CPT | Performed by: SURGERY

## 2023-04-18 RX ORDER — SODIUM CHLORIDE 0.9 % (FLUSH) 0.9 %
3-10 SYRINGE (ML) INJECTION AS NEEDED
OUTPATIENT
Start: 2023-04-18

## 2023-04-18 RX ORDER — SCOLOPAMINE TRANSDERMAL SYSTEM 1 MG/1
1 PATCH, EXTENDED RELEASE TRANSDERMAL ONCE
OUTPATIENT
Start: 2023-04-18 | End: 2023-04-18

## 2023-04-18 RX ORDER — SODIUM CHLORIDE, SODIUM LACTATE, POTASSIUM CHLORIDE, CALCIUM CHLORIDE 600; 310; 30; 20 MG/100ML; MG/100ML; MG/100ML; MG/100ML
150 INJECTION, SOLUTION INTRAVENOUS CONTINUOUS
OUTPATIENT
Start: 2023-04-18

## 2023-04-18 RX ORDER — ENOXAPARIN SODIUM 100 MG/ML
40 INJECTION SUBCUTANEOUS ONCE
OUTPATIENT
Start: 2023-04-18 | End: 2023-04-18

## 2023-04-18 RX ORDER — SODIUM CHLORIDE 9 MG/ML
40 INJECTION, SOLUTION INTRAVENOUS AS NEEDED
OUTPATIENT
Start: 2023-04-18

## 2023-04-18 RX ORDER — CHLORHEXIDINE GLUCONATE 0.12 MG/ML
30 RINSE ORAL
OUTPATIENT
Start: 2023-04-18 | End: 2023-04-18

## 2023-04-18 RX ORDER — PANTOPRAZOLE SODIUM 40 MG/10ML
40 INJECTION, POWDER, LYOPHILIZED, FOR SOLUTION INTRAVENOUS ONCE
OUTPATIENT
Start: 2023-04-18 | End: 2023-04-18

## 2023-04-18 RX ORDER — SODIUM CHLORIDE 0.9 % (FLUSH) 0.9 %
3 SYRINGE (ML) INJECTION EVERY 12 HOURS SCHEDULED
OUTPATIENT
Start: 2023-04-18

## 2023-04-18 RX ORDER — GABAPENTIN 100 MG/1
600 CAPSULE ORAL ONCE
OUTPATIENT
Start: 2023-04-18 | End: 2023-04-18

## 2023-04-18 RX ORDER — ACETAMINOPHEN 500 MG
1000 TABLET ORAL ONCE
OUTPATIENT
Start: 2023-04-18 | End: 2023-04-18

## 2023-04-18 NOTE — H&P (VIEW-ONLY)
[facility]IATR[facility] SURG[facility]  [address]  [address]  [facility] [id number] [phone]      Patient  Name:  Beverly Giron  :  1985      Date of Visit: 23    Chief Complaint:  weight gain; unable to maintain weight loss.   Evaluate for possible metabolic and bariatric surgery    History of Present Illness:  Beverly Giron is a 37 y.o. female who presents today for evaluation, education and consultation regarding metabolic and bariatric surgery (MBS).  Since last seen 2023 she has lost roughly 3 pounds. The patient returns for final visit prior to metabolic and bariatric surgery specifically the sleeve gastrectomy.  Original intake evaluation Alexsandra BARROW PA-C dated 2022 reviewed.    She notes the patient's maximum lifetime weight is 273 pounds and she has episodic heartburn with certain foods treated with Tums as needed no postprandial nausea or abdominal pain had 3 sections through a lower transverse incision and a laparoscopic partial hysterectomy had a history of DVT in her lower extremity following her partial hysterectomy in  was evaluated by hematology with a negative work-up and was felt to be provoked most recent lower extremity duplex shows complete resolution of DVT in May 2022 she remains on Eliquis 5 mg daily and reports her hematologist told her remaining on anticoagulation was optional she had a cardiology work-up following her DVT with an echo with a normal left ventricular ejection fraction and mild mitral regurgitation other history includes anxiety depression migraines low back pain treated with NSAIDs as needed carpal tunnel syndrome is a former smoker and quit in 2022 and her  smokes but not inside.      The patient has had issues with morbid obesity for years and only temporary success with non-surgical methods of weight loss.  The patient is seeking LSG to help with the morbid obesity related conditions of anxiety  and depression, history of carpal tunnel syndrome, elevated LDL cholesterol level, former smoker, hiatal hernia with GE reflux disease, history of DVT, low back pain, migraine headaches, heart murmur with mild mitral regurgitation, varicose veins with venous insufficiency.    37-year-old morbidly obese female from [place].  The patient was interested in metabolic and bariatric surgery, and knows several who have had it done, and says she did her research.  She said she saw another provider and her primary care office Rhianna KELLY who recommended she to see me and apparently recommended against staying locally.  She confirms her  smokes outside only and not in the cars.  She personally denies smoking, vaping or any nicotine or secondhand smoke exposure.  She had a total of 6 deliveries, one of the children was adopted out.  She denies any gallbladder symptoms.  No VTE issues with any of her deliveries or surgeries until her laparoscopic partial hysterectomy.  She developed a left DVT which she attributes to the taping of the Cho catheter to her thigh.  We discussed avoiding NSAIDs 1 week prior and 6 weeks after sleeve gastrectomy to minimize the risk of delayed leak.      Past Medical History:   Diagnosis Date   • Anxiety and depression    • Carpal tunnel syndrome    • Elevated LDL cholesterol level    • Former smoker    • Genital herpes    • GERD (gastroesophageal reflux disease)    • Heart murmur    • Heartburn     Tums PRN; EGD Dr. Canada    • Hiatal hernia with gastroesophageal reflux     EGD Dr. Canada    • History of DVT of lower extremity     following partial hysterectomy- believed to be provoked. Following with heme/onc   • History of partial hysterectomy    • HPV (human papilloma virus) infection    • Hx of eye surgery    • Hx of ovarian cyst    • Low back pain     no steroids. PRN NSAIDs   • Migraines     PRN flexeril and nsaids   • Mitral regurgitation     Mild   •  (normal  spontaneous vaginal delivery)     x 3, 1st two and 4th deliveries.  No complics   • Varicose veins of both lower extremities    • Venous insufficiency      Past Surgical History:   Procedure Laterality Date   •  SECTION N/A     x 3. 3rd and last 2 deliveries   • ECHO - CONVERTED  2022    EF 60%. Trace -Mild MR. GUTIÉRREZ- 4.2   • EYE SURGERY     • SUBTOTAL HYSTERECTOMY      laparoscopic; still has ovaries, complicated by left DVT   • US VENOUS EXTREMITY UNILATERAL  2022    Normal, no DVT   • US VENOUS EXTREMITY UNILATERAL  02/10/2022    Partially occlusive superficial thrombophlebitis greater saphenous vein   • US VENOUS EXTREMITY UNILATERAL  2021    Small amount residual thrombus greater saphenous vein   • US VENOUS EXTREMITY UNILATERAL  2021    Superficial thrombosis greater saphenous vein   • US VENOUS EXTREMITY UNILATERAL  03/10/2021    Occlusive extensive thrombus within the greater saphenous vein from upper calf throughout the thigh       No Known Allergies    Current Outpatient Medications:   •  apixaban (ELIQUIS) 5 MG tablet tablet, Take 1 tablet by mouth Daily., Disp: , Rfl:   •  cyclobenzaprine (FLEXERIL) 10 MG tablet, Take 1 tablet by mouth 2 (Two) Times a Day As Needed for Muscle Spasms., Disp: , Rfl:   •  docusate sodium (COLACE) 250 MG capsule, Take 1 capsule by mouth Daily As Needed for Constipation., Disp: , Rfl:   •  escitalopram (LEXAPRO) 10 MG tablet, Take 1 tablet by mouth Daily., Disp: , Rfl:   •  hydrOXYzine pamoate (VISTARIL) 25 MG capsule, Take 1 capsule by mouth 3 (Three) Times a Day As Needed., Disp: , Rfl:   •  ibuprofen (ADVIL,MOTRIN) 800 MG tablet, Take 1 tablet by mouth 2 (Two) Times a Day As Needed for Mild Pain., Disp: , Rfl:   •  loratadine (CLARITIN) 10 MG tablet, Take 1 tablet by mouth Daily., Disp: , Rfl:   •  metroNIDAZOLE (METROGEL) 1 % gel, Apply  topically to the appropriate area as directed 2 (Two) Times a Day., Disp: , Rfl:   •  multivitamin  with minerals tablet tablet, Take 1 tablet by mouth Daily., Disp: , Rfl:   •  topiramate (TOPAMAX) 25 MG tablet, Take 1 tablet by mouth Daily., Disp: , Rfl:   •  tretinoin (RETIN-A) 0.025 % gel, Apply  topically to the appropriate area as directed Every Night., Disp: , Rfl:   •  triamcinolone (KENALOG) 0.025 % cream, Apply 1 application topically to the appropriate area as directed 2 (Two) Times a Day., Disp: , Rfl:   •  Vitamin D, Cholecalciferol, 50 MCG (2000) capsule, Take  by mouth Daily., Disp: , Rfl:     Social History     Socioeconomic History   • Marital status:    Tobacco Use   • Smoking status: Former     Packs/day: 1.00     Years: 10.00     Pack years: 10.00     Types: Cigarettes     Quit date: 2022     Years since quittin.0   • Smokeless tobacco: Never   • Tobacco comments:     she had stopped in  for a few years then restarted 10/2021. Has stopped again 2022.   Vaping Use   • Vaping Use: Never used   Substance and Sexual Activity   • Alcohol use: Yes     Comment: occasionally   • Drug use: Never   • Sexual activity: Yes     Partners: Male     Birth control/protection: Tubal ligation, Hysterectomy     Family History   Problem Relation Age of Onset   • Hypertension Mother    • COPD Mother    • Sleep apnea Mother    • No Known Problems Father    • Scoliosis Sister    • No Known Problems Sister    • No Known Problems Sister    • Heart attack Maternal Grandmother    • Heart disease Maternal Grandmother    • Cancer Maternal Grandmother    • Liver disease Maternal Grandfather    • Seizures Maternal Grandfather    • Stroke Maternal Grandfather    • No Known Problems Paternal Grandmother    • No Known Problems Daughter    • No Known Problems Daughter    • No Known Problems Daughter    • No Known Problems Son    • No Known Problems Son    • Heart attack Maternal Aunt 60   • Hypertension Maternal Aunt    • Hypertension Maternal Aunt    • Heart attack Maternal Uncle 59       Review of  Systems   Constitutional: Positive for fatigue and unexpected weight gain. Negative for chills, diaphoresis, fever and unexpected weight loss.   HENT: Negative for congestion and facial swelling.    Eyes: Negative for blurred vision, double vision and discharge.   Respiratory: Negative for chest tightness, shortness of breath and stridor.    Cardiovascular: Negative for chest pain, palpitations and leg swelling.   Gastrointestinal: Positive for GERD. Negative for blood in stool.   Endocrine: Negative for polydipsia.   Genitourinary: Negative for hematuria.   Musculoskeletal: Positive for back pain.   Skin: Negative for color change.   Allergic/Immunologic: Negative for immunocompromised state.   Neurological: Negative for confusion.   Psychiatric/Behavioral: Negative for self-injury.       I have reviewed the ROS and confirm that it's accurate today.    Physical Exam:  Vital Signs:  Weight: 127 kg (280 lb 8 oz)   Body mass index is 47.4 kg/m².  Temp: 97.8 °F (36.6 °C)   Heart Rate: 100   BP: 118/76     Physical Exam  Vitals reviewed.   Constitutional:       Appearance: She is well-developed.   HENT:      Head: Normocephalic and atraumatic.      Nose: Nose normal.   Eyes:      Conjunctiva/sclera: Conjunctivae normal.      Pupils: Pupils are equal, round, and reactive to light.   Neck:      Thyroid: No thyromegaly.      Vascular: No carotid bruit.      Trachea: No tracheal deviation.   Cardiovascular:      Rate and Rhythm: Regular rhythm. Tachycardia present.      Heart sounds: Normal heart sounds.      Comments: No murmur appreciated  Pulmonary:      Effort: Pulmonary effort is normal. No respiratory distress.      Breath sounds: Normal breath sounds.   Abdominal:      General: There is no distension.      Palpations: Abdomen is soft.      Tenderness: There is no abdominal tenderness.      Comments: Laparoscopy scars, low transverse scar   Musculoskeletal:         General: No deformity. Normal range of motion.       Cervical back: Normal range of motion and neck supple.   Skin:     General: Skin is warm and dry.      Findings: No rash.      Comments: Scattered tattoos   Neurological:      Mental Status: She is alert and oriented to person, place, and time.      Cranial Nerves: No cranial nerve deficit.      Coordination: Coordination normal.   Psychiatric:         Behavior: Behavior normal.         Thought Content: Thought content normal.         Judgment: Judgment normal.         Patient Active Problem List   Diagnosis   • Migraine without aura and without status migrainosus, not intractable   • Long term (current) use of anticoagulants   • Venous insufficiency   • Varicose veins of left lower extremity with pain   • Deep vein thrombosis   • Heartburn   • Anxiety and depression   • Morbid obesity with body mass index (BMI) of 45.0 to 49.9 in adult   • Hiatal hernia with gastroesophageal reflux       Assessment:    Beverly Giron is a 37 y.o. year old female with medically complicated obesity.    Metabolic and bariatric surgery is deemed medically necessary given the following obesity related comorbidities including anxiety and depression, history of carpal tunnel syndrome, elevated LDL cholesterol level, former smoker, hiatal hernia with GE reflux disease, history of DVT, low back pain, migraine headaches, heart murmur with mild mitral regurgitation, varicose veins with venous insufficiency with current Weight: 127 kg (280 lb 8 oz) and Body mass index is 47.4 kg/m²..    Encounter Diagnoses   Name Primary?   • Morbid obesity with body mass index of 45.0-49.9 in adult Yes   • Hiatal hernia with gastroesophageal reflux       Patient is aware that surgery is a tool, and that weight loss and improvement in comorbidities is not guaranteed but only seen in the context of appropriate use, follow up and physical activity.    The patient was present for an approximately a 2.5 hour discussion of the purpose of MBS, how MBS is a  tool to assist in achieving weight loss goals, the most common complications and how best to avoid them, and the strategies for short and long term weight loss and improvement in comorbidities.  Ample opportunity to discuss questions was available both in group and during the time of individual examination.    I reviewed her Michael report which is negative.  Labs dated 4/10/2023 unremarkable CMP and CBC of note hemoglobin 14.3.  Chest x-ray dated 4/10/2023 no active process.  Pulmonary function test dated 1/27/2023 normal.  EGD Dr. Canada 1/16/2023 showing a small to moderate-sized hiatal hernia Z-line 37 cm widemouth Schatzki's ring and some changes of eosinophilic esophagitis in the mid and distal esophagus pathology of the antrum showed reactive changes negative for H. pylori distal esophageal biopsies showed reactive squamous mucosa with scattered eosinophils greater than 15 per high-power field mid esophageal biopsies show reactive changes only.  Cardiology clearance 7/18/2022 Livia KELLY noting a holosystolic murmur 1-2/6 and recommending referral to Dr. Gian Anderson for chronic venous insufficiency and varicose veins and to Dr. Hall hematology with plans to obtain an echo.  Follow-up clearance note dated 1/31/2023 noting the echo showed normal ejection fraction 60% no diastolic dysfunction trace mitral regurgitation and noted that the hematologist recommended bridging anticoagulation.  Note from the hematologist dated 11/21/2022 with a handwritten addendum 1/9/2022 recommending bridging with Lovenox since she had residual thrombus on study May 2022.  The actual duplex report from [facility] dated 5/23/2022 shows no residual thrombosis and note sent to Alexsandra BARROW PA-C to clarify this with .  Psychosocial evaluation dated 12/14/2022 Marissa ANDERSON PhD very good candidate.  Dietitian evaluation dated 12/15/2022 Deborah ALMENDAREZ RD notes the patient eats 2 meals and 2 snacks daily high in fat and carbs  "large portions.  Letter of support primary care provider Rhianna IGNACIO ROBIN dated 12/20/2022.  Negative nicotine testing 2/16/2023.  Negative H. pylori breath test dated 12/14/2022 intake labs unremarkable CBC and CMP normal hemoglobin A1c normal lipid panel separate  normal TSH.  Pulmonary function test dated 1/26/2023  FEV1 132 DLCO 86 Negative serum H. Pylori.  Cardiology clearance dated 1/31/2023 noting echo 8-22 showed a normal ejection fraction of 60% no diastolic dysfunction trace MR signature illegible.  Hematology note dated 11/21/2022 Dr. Hall noting on handwritten note 1/9/2022 that recommend bridging with Lovenox since she had residual thrombus on 5/22.  Once again no residual thrombus noted on that study.  Please see scanned records that I have reviewed and signed off on today.  All of this in addition to the patient's unique history and exam has been taken into consideration in determining their appropriate candidacy for MBS.    Complications  of laparoscopic/possible robotic gastric sleeve were discussed. The patient is well aware of the potential complications of surgery that include but not limited to bleeding, infections, deep venous thrombosis, pulmonary embolism, pulmonary complications such as pneumonia, cardiac events, hernias, small bowel obstruction, damage to the spleen or other organs, bowel injury, disfiguring scars, failure to lose weight, need for additional surgery, conversion to an open procedure, and death. Patient is also aware of complications which apply in this particular procedure that can include but are not limited to a \"leak\" at the staple line which in some instances may require conversion to gastric bypass.    The patient is aware if a hiatal hernia is encountered, it likely will be repaired.  R/B/A Rx to hiatal hernia repair were discussed as outlined in our long consent form.  Briefly risks in addition to those for LSG include recurrent hernia, CRYSTAL, " dysphagia, esophageal injury, pneumothorax, injury to the vagus nerves, injury to the thoracic duct, aorta or vena cava.    I discussed avoiding all tobacco products, nicotine,  and second hand smoke at least 2 weeks pre-operatively and 6 weeks post-operatively to minimize the risk of sleeve leak.  This included discussing the importance of avoiding even secondhand smoke as the risk of leak is increased.  Examples discussed:  Avoid going in a house or riding in a car where someone has previously smoked in the last 2 weeks and for 6 weeks postoperatively.  Avoid living in a house where someone smokes (even if it's in a separate room/patio/attached garage, etc.).   Avoid congregating with a group of people who are smoking even if it's outside.  It is OK to be around wood burning fires and barbecue.  I explained that I do not know if marijuana has a same effects but my overall recommendation is to avoid it for 2 weeks prior in 6 weeks after surgery.     Discussed the risks, benefits and alternative therapies at great length as outlined in our extensive consent forms, consent videos, and educational teaching process under the direction of the center's .    A copy of the patient's signed informed consent is on file.      Plan:    After evaluation today I think the patient is a reasonable candidate for laparoscopic sleeve gastrectomy, hiatal hernia repair, and EGD.  Clarify bridging anticoagulation as above.    Other issues include anxiety and depression, history of carpal tunnel syndrome, elevated LDL cholesterol level, former smoker, hiatal hernia with GE reflux disease, history of DVT, low back pain, migraine headaches, heart murmur with mild mitral regurgitation, varicose veins with venous insufficiency    Thank you Hyun KELLY and Rhianna KELLY for the opportunity evaluate Mrs. Giron.      Jovanny Canada MD              Answers for HPI/ROS submitted by the patient on 4/11/2023  Please  describe your symptoms.: MD consult  Have you had these symptoms before?: No  How long have you been having these symptoms?: Greater than 2 weeks  Please list any medications you are currently taking for this condition.: Women's daily multivitamin, eliquis 5 mg tablet one time daily, Loratadine 10 mg tablet one time daily, cyclobenzaprine HCl 10 mg one time daily, stool softener laxative 250 one capsule by mouth every day as needed, hydroxyzine Grecia 25 mg capsule by mouth three times a day as needed, women's daily care probiotic, cranberry 500 mg pill daily, vitamin D3 2000 units soft gel one capsule by mouth every day, topamax 25 mg tablet one time a day, ibuprofen 800 mg tablet one tablet two times a day as needed, and Lexapro 10 mg once every day  Please describe any probable cause for these symptoms. : NA  What is the primary reason for your visit?: Other

## 2023-04-18 NOTE — LETTER
2023     ROBIN Augustin  [address]  [facility] [address]    Patient: Beverly Giron   YOB: 1985   Date of Visit: 2023       Dear ROBIN Ding:    Thank you for referring Beverly Giron to me for evaluation. Below are the relevant portions of my assessment and plan of care.    If you have questions, please do not hesitate to call me. I look forward to following Beverly along with you.         Sincerely,        Jovanny Canada MD        CC: No Recipients    Jovanny Canada MD  23 1430  Signed  [facility] BARIATRIC SURGERY  [address]  [address]  [facility] [address] [phone]      Patient  Name:  Beverly Giron  :  1985      Date of Visit: 23    Chief Complaint:  weight gain; unable to maintain weight loss.   Evaluate for possible metabolic and bariatric surgery    History of Present Illness:  Beverly Giron is a 37 y.o. female who presents today for evaluation, education and consultation regarding metabolic and bariatric surgery (MBS).  Since last seen 2023 she has lost roughly 3 pounds. The patient returns for final visit prior to metabolic and bariatric surgery specifically the sleeve gastrectomy.  Original intake evaluation Alexsandra BARROW PA-C dated 2022 reviewed.    She notes the patient's maximum lifetime weight is 273 pounds and she has episodic heartburn with certain foods treated with Tums as needed no postprandial nausea or abdominal pain had 3 sections through a lower transverse incision and a laparoscopic partial hysterectomy had a history of DVT in her lower extremity following her partial hysterectomy in  was evaluated by hematology with a negative work-up and was felt to be provoked most recent lower extremity duplex shows complete resolution of DVT in May 2022 she remains on Eliquis 5 mg daily and reports her hematologist told her remaining on anticoagulation was optional  she had a cardiology work-up following her DVT with an echo with a normal left ventricular ejection fraction and mild mitral regurgitation other history includes anxiety depression migraines low back pain treated with NSAIDs as needed carpal tunnel syndrome is a former smoker and quit in April 2022 and her  smokes but not inside.      The patient has had issues with morbid obesity for years and only temporary success with non-surgical methods of weight loss.  The patient is seeking LSG to help with the morbid obesity related conditions of anxiety and depression, history of carpal tunnel syndrome, elevated LDL cholesterol level, former smoker, hiatal hernia with GE reflux disease, history of DVT, low back pain, migraine headaches, heart murmur with mild mitral regurgitation, varicose veins with venous insufficiency.    37-year-old morbidly obese female from [place].  The patient was interested in metabolic and bariatric surgery, and knows several who have had it done, and says she did her research.  She said she saw another provider and her primary care office Rhianna KELLY who recommended she to see me and apparently recommended against staying locally.  She confirms her  smokes outside only and not in the cars.  She personally denies smoking, vaping or any nicotine or secondhand smoke exposure.  She had a total of 6 deliveries, one of the children was adopted out.  She denies any gallbladder symptoms.  No VTE issues with any of her deliveries or surgeries until her laparoscopic partial hysterectomy.  She developed a left DVT which she attributes to the taping of the Cho catheter to her thigh.  We discussed avoiding NSAIDs 1 week prior and 6 weeks after sleeve gastrectomy to minimize the risk of delayed leak.      Past Medical History:   Diagnosis Date   • Anxiety and depression    • Carpal tunnel syndrome    • Elevated LDL cholesterol level    • Former smoker    • Genital herpes    • GERD  (gastroesophageal reflux disease)    • Heart murmur    • Heartburn     Tums PRN; EGD Dr. Canada    • Hiatal hernia with gastroesophageal reflux     EGD Dr. Canada    • History of DVT of lower extremity     following partial hysterectomy- believed to be provoked. Following with heme/onc   • History of partial hysterectomy    • HPV (human papilloma virus) infection    • Hx of eye surgery    • Hx of ovarian cyst    • Low back pain     no steroids. PRN NSAIDs   • Migraines     PRN flexeril and nsaids   • Mitral regurgitation     Mild   •  (normal spontaneous vaginal delivery)     x 3, 1st two and 4th deliveries.  No complics   • Varicose veins of both lower extremities    • Venous insufficiency      Past Surgical History:   Procedure Laterality Date   •  SECTION N/A     x 3. 3rd and last 2 deliveries   • ECHO - CONVERTED  2022    EF 60%. Trace -Mild  LA- 4.2   • EYE SURGERY     • SUBTOTAL HYSTERECTOMY      laparoscopic; still has ovaries, complicated by left DVT   • US VENOUS EXTREMITY UNILATERAL  2022    Normal, no DVT   • US VENOUS EXTREMITY UNILATERAL  02/10/2022    Partially occlusive superficial thrombophlebitis greater saphenous vein   • US VENOUS EXTREMITY UNILATERAL  2021    Small amount residual thrombus greater saphenous vein   • US VENOUS EXTREMITY UNILATERAL  2021    Superficial thrombosis greater saphenous vein   • US VENOUS EXTREMITY UNILATERAL  03/10/2021    Occlusive extensive thrombus within the greater saphenous vein from upper calf throughout the thigh       No Known Allergies    Current Outpatient Medications:   •  apixaban (ELIQUIS) 5 MG tablet tablet, Take 1 tablet by mouth Daily., Disp: , Rfl:   •  cyclobenzaprine (FLEXERIL) 10 MG tablet, Take 1 tablet by mouth 2 (Two) Times a Day As Needed for Muscle Spasms., Disp: , Rfl:   •  docusate sodium (COLACE) 250 MG capsule, Take 1 capsule by mouth Daily As Needed for Constipation., Disp: , Rfl:    •  escitalopram (LEXAPRO) 10 MG tablet, Take 1 tablet by mouth Daily., Disp: , Rfl:   •  hydrOXYzine pamoate (VISTARIL) 25 MG capsule, Take 1 capsule by mouth 3 (Three) Times a Day As Needed., Disp: , Rfl:   •  ibuprofen (ADVIL,MOTRIN) 800 MG tablet, Take 1 tablet by mouth 2 (Two) Times a Day As Needed for Mild Pain., Disp: , Rfl:   •  loratadine (CLARITIN) 10 MG tablet, Take 1 tablet by mouth Daily., Disp: , Rfl:   •  metroNIDAZOLE (METROGEL) 1 % gel, Apply  topically to the appropriate area as directed 2 (Two) Times a Day., Disp: , Rfl:   •  multivitamin with minerals tablet tablet, Take 1 tablet by mouth Daily., Disp: , Rfl:   •  topiramate (TOPAMAX) 25 MG tablet, Take 1 tablet by mouth Daily., Disp: , Rfl:   •  tretinoin (RETIN-A) 0.025 % gel, Apply  topically to the appropriate area as directed Every Night., Disp: , Rfl:   •  triamcinolone (KENALOG) 0.025 % cream, Apply 1 application topically to the appropriate area as directed 2 (Two) Times a Day., Disp: , Rfl:   •  Vitamin D, Cholecalciferol, 50 MCG (2000 UT) capsule, Take  by mouth Daily., Disp: , Rfl:     Social History     Socioeconomic History   • Marital status:    Tobacco Use   • Smoking status: Former     Packs/day: 1.00     Years: 10.00     Pack years: 10.00     Types: Cigarettes     Quit date: 2022     Years since quittin.0   • Smokeless tobacco: Never   • Tobacco comments:     she had stopped in 2017 for a few years then restarted 10/2021. Has stopped again 2022.   Vaping Use   • Vaping Use: Never used   Substance and Sexual Activity   • Alcohol use: Yes     Comment: occasionally   • Drug use: Never   • Sexual activity: Yes     Partners: Male     Birth control/protection: Tubal ligation, Hysterectomy     Family History   Problem Relation Age of Onset   • Hypertension Mother    • COPD Mother    • Sleep apnea Mother    • No Known Problems Father    • Scoliosis Sister    • No Known Problems Sister    • No Known Problems Sister    •  Heart attack Maternal Grandmother    • Heart disease Maternal Grandmother    • Cancer Maternal Grandmother    • Liver disease Maternal Grandfather    • Seizures Maternal Grandfather    • Stroke Maternal Grandfather    • No Known Problems Paternal Grandmother    • No Known Problems Daughter    • No Known Problems Daughter    • No Known Problems Daughter    • No Known Problems Son    • No Known Problems Son    • Heart attack Maternal Aunt 60   • Hypertension Maternal Aunt    • Hypertension Maternal Aunt    • Heart attack Maternal Uncle 59       Review of Systems   Constitutional: Positive for fatigue and unexpected weight gain. Negative for chills, diaphoresis, fever and unexpected weight loss.   HENT: Negative for congestion and facial swelling.    Eyes: Negative for blurred vision, double vision and discharge.   Respiratory: Negative for chest tightness, shortness of breath and stridor.    Cardiovascular: Negative for chest pain, palpitations and leg swelling.   Gastrointestinal: Positive for GERD. Negative for blood in stool.   Endocrine: Negative for polydipsia.   Genitourinary: Negative for hematuria.   Musculoskeletal: Positive for back pain.   Skin: Negative for color change.   Allergic/Immunologic: Negative for immunocompromised state.   Neurological: Negative for confusion.   Psychiatric/Behavioral: Negative for self-injury.       I have reviewed the ROS and confirm that it's accurate today.    Physical Exam:  Vital Signs:  Weight: 127 kg (280 lb 8 oz)   Body mass index is 47.4 kg/m².  Temp: 97.8 °F (36.6 °C)   Heart Rate: 100   BP: 118/76     Physical Exam  Vitals reviewed.   Constitutional:       Appearance: She is well-developed.   HENT:      Head: Normocephalic and atraumatic.      Nose: Nose normal.   Eyes:      Conjunctiva/sclera: Conjunctivae normal.      Pupils: Pupils are equal, round, and reactive to light.   Neck:      Thyroid: No thyromegaly.      Vascular: No carotid bruit.      Trachea: No  tracheal deviation.   Cardiovascular:      Rate and Rhythm: Regular rhythm. Tachycardia present.      Heart sounds: Normal heart sounds.      Comments: No murmur appreciated  Pulmonary:      Effort: Pulmonary effort is normal. No respiratory distress.      Breath sounds: Normal breath sounds.   Abdominal:      General: There is no distension.      Palpations: Abdomen is soft.      Tenderness: There is no abdominal tenderness.      Comments: Laparoscopy scars, low transverse scar   Musculoskeletal:         General: No deformity. Normal range of motion.      Cervical back: Normal range of motion and neck supple.   Skin:     General: Skin is warm and dry.      Findings: No rash.      Comments: Scattered tattoos   Neurological:      Mental Status: She is alert and oriented to person, place, and time.      Cranial Nerves: No cranial nerve deficit.      Coordination: Coordination normal.   Psychiatric:         Behavior: Behavior normal.         Thought Content: Thought content normal.         Judgment: Judgment normal.         Patient Active Problem List   Diagnosis   • Migraine without aura and without status migrainosus, not intractable   • Long term (current) use of anticoagulants   • Venous insufficiency   • Varicose veins of left lower extremity with pain   • Deep vein thrombosis   • Heartburn   • Anxiety and depression   • Morbid obesity with body mass index (BMI) of 45.0 to 49.9 in adult   • Hiatal hernia with gastroesophageal reflux       Assessment:    Beverly Giron is a 37 y.o. year old female with medically complicated obesity.    Metabolic and bariatric surgery is deemed medically necessary given the following obesity related comorbidities including anxiety and depression, history of carpal tunnel syndrome, elevated LDL cholesterol level, former smoker, hiatal hernia with GE reflux disease, history of DVT, low back pain, migraine headaches, heart murmur with mild mitral regurgitation, varicose veins  with venous insufficiency with current Weight: 127 kg (280 lb 8 oz) and Body mass index is 47.4 kg/m²..    Encounter Diagnoses   Name Primary?   • Morbid obesity with body mass index of 45.0-49.9 in adult Yes   • Hiatal hernia with gastroesophageal reflux       Patient is aware that surgery is a tool, and that weight loss and improvement in comorbidities is not guaranteed but only seen in the context of appropriate use, follow up and physical activity.    The patient was present for an approximately a 2.5 hour discussion of the purpose of MBS, how MBS is a tool to assist in achieving weight loss goals, the most common complications and how best to avoid them, and the strategies for short and long term weight loss and improvement in comorbidities.  Ample opportunity to discuss questions was available both in group and during the time of individual examination.    I reviewed her Michael report which is negative.  Labs dated 4/10/2023 unremarkable CMP and CBC of note hemoglobin 14.3.  Chest x-ray dated 4/10/2023 no active process.  Pulmonary function test dated 1/27/2023 normal.  EGD Dr. Canada 1/16/2023 showing a small to moderate-sized hiatal hernia Z-line 37 cm widemouth Schatzki's ring and some changes of eosinophilic esophagitis in the mid and distal esophagus pathology of the antrum showed reactive changes negative for H. pylori distal esophageal biopsies showed reactive squamous mucosa with scattered eosinophils greater than 15 per high-power field mid esophageal biopsies show reactive changes only.  Cardiology clearance 7/18/2022 Livia KELLY noting a holosystolic murmur 1-2/6 and recommending referral to Dr. Gian Anderson for chronic venous insufficiency and varicose veins and to Dr. Hall hematology with plans to obtain an echo.  Follow-up clearance note dated 1/31/2023 noting the echo showed normal ejection fraction 60% no diastolic dysfunction trace mitral regurgitation and noted that the hematologist recommended  bridging anticoagulation.  Note from the hematologist dated 11/21/2022 with a handwritten addendum 1/9/2022 recommending bridging with Lovenox since she had residual thrombus on study May 2022.  The actual duplex report from [facility] dated 5/23/2022 shows no residual thrombosis and note sent to Alexsandra BARROW PA-C to clarify this with .  Psychosocial evaluation dated 12/14/2022 Marissa ANDERSON PhD very good candidate.  Dietitian evaluation dated 12/15/2022 Deborah ALMENDAREZ RD notes the patient eats 2 meals and 2 snacks daily high in fat and carbs large portions.  Letter of support primary care provider Rhianna KELLY dated 12/20/2022.  Negative nicotine testing 2/16/2023.  Negative H. pylori breath test dated 12/14/2022 intake labs unremarkable CBC and CMP normal hemoglobin A1c normal lipid panel separate  normal TSH.  Pulmonary function test dated 1/26/2023  FEV1 132 DLCO 86 Negative serum H. Pylori.  Cardiology clearance dated 1/31/2023 noting echo 8-22 showed a normal ejection fraction of 60% no diastolic dysfunction trace MR signature illegible.  Hematology note dated 11/21/2022 Dr. Hall noting on handwritten note 1/9/2022 that recommend bridging with Lovenox since she had residual thrombus on 5/22.  Once again no residual thrombus noted on that study.  Please see scanned records that I have reviewed and signed off on today.  All of this in addition to the patient's unique history and exam has been taken into consideration in determining their appropriate candidacy for MBS.    Complications  of laparoscopic/possible robotic gastric sleeve were discussed. The patient is well aware of the potential complications of surgery that include but not limited to bleeding, infections, deep venous thrombosis, pulmonary embolism, pulmonary complications such as pneumonia, cardiac events, hernias, small bowel obstruction, damage to the spleen or other organs, bowel injury, disfiguring scars,  "failure to lose weight, need for additional surgery, conversion to an open procedure, and death. Patient is also aware of complications which apply in this particular procedure that can include but are not limited to a \"leak\" at the staple line which in some instances may require conversion to gastric bypass.    The patient is aware if a hiatal hernia is encountered, it likely will be repaired.  R/B/A Rx to hiatal hernia repair were discussed as outlined in our long consent form.  Briefly risks in addition to those for LSG include recurrent hernia, CRYSTAL, dysphagia, esophageal injury, pneumothorax, injury to the vagus nerves, injury to the thoracic duct, aorta or vena cava.    I discussed avoiding all tobacco products, nicotine,  and second hand smoke at least 2 weeks pre-operatively and 6 weeks post-operatively to minimize the risk of sleeve leak.  This included discussing the importance of avoiding even secondhand smoke as the risk of leak is increased.  Examples discussed:  Avoid going in a house or riding in a car where someone has previously smoked in the last 2 weeks and for 6 weeks postoperatively.  Avoid living in a house where someone smokes (even if it's in a separate room/patio/attached garage, etc.).   Avoid congregating with a group of people who are smoking even if it's outside.  It is OK to be around wood burning fires and barbecue.  I explained that I do not know if marijuana has a same effects but my overall recommendation is to avoid it for 2 weeks prior in 6 weeks after surgery.     Discussed the risks, benefits and alternative therapies at great length as outlined in our extensive consent forms, consent videos, and educational teaching process under the direction of the center's .    A copy of the patient's signed informed consent is on file.      Plan:    After evaluation today I think the patient is a reasonable candidate for laparoscopic sleeve gastrectomy, hiatal hernia " repair, and EGD.  Clarify bridging anticoagulation as above.    Other issues include anxiety and depression, history of carpal tunnel syndrome, elevated LDL cholesterol level, former smoker, hiatal hernia with GE reflux disease, history of DVT, low back pain, migraine headaches, heart murmur with mild mitral regurgitation, varicose veins with venous insufficiency    Thank you Hyun KELLY and Rhianna KELLY for the opportunity evaluate Mrs. Giron.      Jovanny Canada MD              Answers for HPI/ROS submitted by the patient on 4/11/2023  Please describe your symptoms.: MD consult  Have you had these symptoms before?: No  How long have you been having these symptoms?: Greater than 2 weeks  Please list any medications you are currently taking for this condition.: Women's daily multivitamin, eliquis 5 mg tablet one time daily, Loratadine 10 mg tablet one time daily, cyclobenzaprine HCl 10 mg one time daily, stool softener laxative 250 one capsule by mouth every day as needed, hydroxyzine Grecia 25 mg capsule by mouth three times a day as needed, women's daily care probiotic, cranberry 500 mg pill daily, vitamin D3 2000 units soft gel one capsule by mouth every day, topamax 25 mg tablet one time a day, ibuprofen 800 mg tablet one tablet two times a day as needed, and Lexapro 10 mg once every day  Please describe any probable cause for these symptoms. : NA  What is the primary reason for your visit?: Other

## 2023-04-18 NOTE — PROGRESS NOTES
[facility] GROUP BARIATRIC SURGERY  [address]  [address]  [facility] [id number] [phone]      Patient  Name:  Beverly Giron  :  1985      Date of Visit: 23    Chief Complaint:  weight gain; unable to maintain weight loss.   Evaluate for possible metabolic and bariatric surgery    History of Present Illness:  Beverly Giron is a 37 y.o. female who presents today for evaluation, education and consultation regarding metabolic and bariatric surgery (MBS).  Since last seen 2023 she has lost roughly 3 pounds. The patient returns for final visit prior to metabolic and bariatric surgery specifically the sleeve gastrectomy.  Original intake evaluation Alexsandra BARROW PA-C dated 2022 reviewed.    She notes the patient's maximum lifetime weight is 273 pounds and she has episodic heartburn with certain foods treated with Tums as needed no postprandial nausea or abdominal pain had 3 sections through a lower transverse incision and a laparoscopic partial hysterectomy had a history of DVT in her lower extremity following her partial hysterectomy in  was evaluated by hematology with a negative work-up and was felt to be provoked most recent lower extremity duplex shows complete resolution of DVT in May 2022 she remains on Eliquis 5 mg daily and reports her hematologist told her remaining on anticoagulation was optional she had a cardiology work-up following her DVT with an echo with a normal left ventricular ejection fraction and mild mitral regurgitation other history includes anxiety depression migraines low back pain treated with NSAIDs as needed carpal tunnel syndrome is a former smoker and quit in 2022 and her  smokes but not inside.      The patient has had issues with morbid obesity for years and only temporary success with non-surgical methods of weight loss.  The patient is seeking LSG to help with the morbid obesity related conditions of anxiety  and depression, history of carpal tunnel syndrome, elevated LDL cholesterol level, former smoker, hiatal hernia with GE reflux disease, history of DVT, low back pain, migraine headaches, heart murmur with mild mitral regurgitation, varicose veins with venous insufficiency.    37-year-old morbidly obese female from [place].  The patient was interested in metabolic and bariatric surgery, and knows several who have had it done, and says she did her research.  She said she saw another provider and her primary care office Rhianna KELLY who recommended she to see me and apparently recommended against staying locally.  She confirms her  smokes outside only and not in the cars.  She personally denies smoking, vaping or any nicotine or secondhand smoke exposure.  She had a total of 6 deliveries, one of the children was adopted out.  She denies any gallbladder symptoms.  No VTE issues with any of her deliveries or surgeries until her laparoscopic partial hysterectomy.  She developed a left DVT which she attributes to the taping of the Cho catheter to her thigh.  We discussed avoiding NSAIDs 1 week prior and 6 weeks after sleeve gastrectomy to minimize the risk of delayed leak.      Past Medical History:   Diagnosis Date   • Anxiety and depression    • Carpal tunnel syndrome    • Elevated LDL cholesterol level    • Former smoker    • Genital herpes    • GERD (gastroesophageal reflux disease)    • Heart murmur    • Heartburn     Tums PRN; EGD Dr. Canada    • Hiatal hernia with gastroesophageal reflux     EGD Dr. Canada    • History of DVT of lower extremity     following partial hysterectomy- believed to be provoked. Following with heme/onc   • History of partial hysterectomy    • HPV (human papilloma virus) infection    • Hx of eye surgery    • Hx of ovarian cyst    • Low back pain     no steroids. PRN NSAIDs   • Migraines     PRN flexeril and nsaids   • Mitral regurgitation     Mild   •  (normal  spontaneous vaginal delivery)     x 3, 1st two and 4th deliveries.  No complics   • Varicose veins of both lower extremities    • Venous insufficiency      Past Surgical History:   Procedure Laterality Date   •  SECTION N/A     x 3. 3rd and last 2 deliveries   • ECHO - CONVERTED  2022    EF 60%. Trace -Mild MR. GUTIÉRREZ- 4.2   • EYE SURGERY     • SUBTOTAL HYSTERECTOMY      laparoscopic; still has ovaries, complicated by left DVT   • US VENOUS EXTREMITY UNILATERAL  2022    Normal, no DVT   • US VENOUS EXTREMITY UNILATERAL  02/10/2022    Partially occlusive superficial thrombophlebitis greater saphenous vein   • US VENOUS EXTREMITY UNILATERAL  2021    Small amount residual thrombus greater saphenous vein   • US VENOUS EXTREMITY UNILATERAL  2021    Superficial thrombosis greater saphenous vein   • US VENOUS EXTREMITY UNILATERAL  03/10/2021    Occlusive extensive thrombus within the greater saphenous vein from upper calf throughout the thigh       No Known Allergies    Current Outpatient Medications:   •  apixaban (ELIQUIS) 5 MG tablet tablet, Take 1 tablet by mouth Daily., Disp: , Rfl:   •  cyclobenzaprine (FLEXERIL) 10 MG tablet, Take 1 tablet by mouth 2 (Two) Times a Day As Needed for Muscle Spasms., Disp: , Rfl:   •  docusate sodium (COLACE) 250 MG capsule, Take 1 capsule by mouth Daily As Needed for Constipation., Disp: , Rfl:   •  escitalopram (LEXAPRO) 10 MG tablet, Take 1 tablet by mouth Daily., Disp: , Rfl:   •  hydrOXYzine pamoate (VISTARIL) 25 MG capsule, Take 1 capsule by mouth 3 (Three) Times a Day As Needed., Disp: , Rfl:   •  ibuprofen (ADVIL,MOTRIN) 800 MG tablet, Take 1 tablet by mouth 2 (Two) Times a Day As Needed for Mild Pain., Disp: , Rfl:   •  loratadine (CLARITIN) 10 MG tablet, Take 1 tablet by mouth Daily., Disp: , Rfl:   •  metroNIDAZOLE (METROGEL) 1 % gel, Apply  topically to the appropriate area as directed 2 (Two) Times a Day., Disp: , Rfl:   •  multivitamin  with minerals tablet tablet, Take 1 tablet by mouth Daily., Disp: , Rfl:   •  topiramate (TOPAMAX) 25 MG tablet, Take 1 tablet by mouth Daily., Disp: , Rfl:   •  tretinoin (RETIN-A) 0.025 % gel, Apply  topically to the appropriate area as directed Every Night., Disp: , Rfl:   •  triamcinolone (KENALOG) 0.025 % cream, Apply 1 application topically to the appropriate area as directed 2 (Two) Times a Day., Disp: , Rfl:   •  Vitamin D, Cholecalciferol, 50 MCG (2000) capsule, Take  by mouth Daily., Disp: , Rfl:     Social History     Socioeconomic History   • Marital status:    Tobacco Use   • Smoking status: Former     Packs/day: 1.00     Years: 10.00     Pack years: 10.00     Types: Cigarettes     Quit date: 2022     Years since quittin.0   • Smokeless tobacco: Never   • Tobacco comments:     she had stopped in  for a few years then restarted 10/2021. Has stopped again 2022.   Vaping Use   • Vaping Use: Never used   Substance and Sexual Activity   • Alcohol use: Yes     Comment: occasionally   • Drug use: Never   • Sexual activity: Yes     Partners: Male     Birth control/protection: Tubal ligation, Hysterectomy     Family History   Problem Relation Age of Onset   • Hypertension Mother    • COPD Mother    • Sleep apnea Mother    • No Known Problems Father    • Scoliosis Sister    • No Known Problems Sister    • No Known Problems Sister    • Heart attack Maternal Grandmother    • Heart disease Maternal Grandmother    • Cancer Maternal Grandmother    • Liver disease Maternal Grandfather    • Seizures Maternal Grandfather    • Stroke Maternal Grandfather    • No Known Problems Paternal Grandmother    • No Known Problems Daughter    • No Known Problems Daughter    • No Known Problems Daughter    • No Known Problems Son    • No Known Problems Son    • Heart attack Maternal Aunt 60   • Hypertension Maternal Aunt    • Hypertension Maternal Aunt    • Heart attack Maternal Uncle 59       Review of  Systems   Constitutional: Positive for fatigue and unexpected weight gain. Negative for chills, diaphoresis, fever and unexpected weight loss.   HENT: Negative for congestion and facial swelling.    Eyes: Negative for blurred vision, double vision and discharge.   Respiratory: Negative for chest tightness, shortness of breath and stridor.    Cardiovascular: Negative for chest pain, palpitations and leg swelling.   Gastrointestinal: Positive for GERD. Negative for blood in stool.   Endocrine: Negative for polydipsia.   Genitourinary: Negative for hematuria.   Musculoskeletal: Positive for back pain.   Skin: Negative for color change.   Allergic/Immunologic: Negative for immunocompromised state.   Neurological: Negative for confusion.   Psychiatric/Behavioral: Negative for self-injury.       I have reviewed the ROS and confirm that it's accurate today.    Physical Exam:  Vital Signs:  Weight: 127 kg (280 lb 8 oz)   Body mass index is 47.4 kg/m².  Temp: 97.8 °F (36.6 °C)   Heart Rate: 100   BP: 118/76     Physical Exam  Vitals reviewed.   Constitutional:       Appearance: She is well-developed.   HENT:      Head: Normocephalic and atraumatic.      Nose: Nose normal.   Eyes:      Conjunctiva/sclera: Conjunctivae normal.      Pupils: Pupils are equal, round, and reactive to light.   Neck:      Thyroid: No thyromegaly.      Vascular: No carotid bruit.      Trachea: No tracheal deviation.   Cardiovascular:      Rate and Rhythm: Regular rhythm. Tachycardia present.      Heart sounds: Normal heart sounds.      Comments: No murmur appreciated  Pulmonary:      Effort: Pulmonary effort is normal. No respiratory distress.      Breath sounds: Normal breath sounds.   Abdominal:      General: There is no distension.      Palpations: Abdomen is soft.      Tenderness: There is no abdominal tenderness.      Comments: Laparoscopy scars, low transverse scar   Musculoskeletal:         General: No deformity. Normal range of motion.       Cervical back: Normal range of motion and neck supple.   Skin:     General: Skin is warm and dry.      Findings: No rash.      Comments: Scattered tattoos   Neurological:      Mental Status: She is alert and oriented to person, place, and time.      Cranial Nerves: No cranial nerve deficit.      Coordination: Coordination normal.   Psychiatric:         Behavior: Behavior normal.         Thought Content: Thought content normal.         Judgment: Judgment normal.         Patient Active Problem List   Diagnosis   • Migraine without aura and without status migrainosus, not intractable   • Long term (current) use of anticoagulants   • Venous insufficiency   • Varicose veins of left lower extremity with pain   • Deep vein thrombosis   • Heartburn   • Anxiety and depression   • Morbid obesity with body mass index (BMI) of 45.0 to 49.9 in adult   • Hiatal hernia with gastroesophageal reflux       Assessment:    Beverly Giron is a 37 y.o. year old female with medically complicated obesity.    Metabolic and bariatric surgery is deemed medically necessary given the following obesity related comorbidities including anxiety and depression, history of carpal tunnel syndrome, elevated LDL cholesterol level, former smoker, hiatal hernia with GE reflux disease, history of DVT, low back pain, migraine headaches, heart murmur with mild mitral regurgitation, varicose veins with venous insufficiency with current Weight: 127 kg (280 lb 8 oz) and Body mass index is 47.4 kg/m²..    Encounter Diagnoses   Name Primary?   • Morbid obesity with body mass index of 45.0-49.9 in adult Yes   • Hiatal hernia with gastroesophageal reflux       Patient is aware that surgery is a tool, and that weight loss and improvement in comorbidities is not guaranteed but only seen in the context of appropriate use, follow up and physical activity.    The patient was present for an approximately a 2.5 hour discussion of the purpose of MBS, how MBS is a  tool to assist in achieving weight loss goals, the most common complications and how best to avoid them, and the strategies for short and long term weight loss and improvement in comorbidities.  Ample opportunity to discuss questions was available both in group and during the time of individual examination.    I reviewed her Michael report which is negative.  Labs dated 4/10/2023 unremarkable CMP and CBC of note hemoglobin 14.3.  Chest x-ray dated 4/10/2023 no active process.  Pulmonary function test dated 1/27/2023 normal.  EGD Dr. Canada 1/16/2023 showing a small to moderate-sized hiatal hernia Z-line 37 cm widemouth Schatzki's ring and some changes of eosinophilic esophagitis in the mid and distal esophagus pathology of the antrum showed reactive changes negative for H. pylori distal esophageal biopsies showed reactive squamous mucosa with scattered eosinophils greater than 15 per high-power field mid esophageal biopsies show reactive changes only.  Cardiology clearance 7/18/2022 Livia KELLY noting a holosystolic murmur 1-2/6 and recommending referral to Dr. Gian Anderson for chronic venous insufficiency and varicose veins and to Dr. Hall hematology with plans to obtain an echo.  Follow-up clearance note dated 1/31/2023 noting the echo showed normal ejection fraction 60% no diastolic dysfunction trace mitral regurgitation and noted that the hematologist recommended bridging anticoagulation.  Note from the hematologist dated 11/21/2022 with a handwritten addendum 1/9/2022 recommending bridging with Lovenox since she had residual thrombus on study May 2022.  The actual duplex report from [facility] dated 5/23/2022 shows no residual thrombosis and note sent to Alexsandra BARROW PA-C to clarify this with .  Psychosocial evaluation dated 12/14/2022 Marissa ANDERSON PhD very good candidate.  Dietitian evaluation dated 12/15/2022 Deborah ALMENDAREZ RD notes the patient eats 2 meals and 2 snacks daily high in fat and carbs  "large portions.  Letter of support primary care provider Rhianna IGNACIO ROBIN dated 12/20/2022.  Negative nicotine testing 2/16/2023.  Negative H. pylori breath test dated 12/14/2022 intake labs unremarkable CBC and CMP normal hemoglobin A1c normal lipid panel separate  normal TSH.  Pulmonary function test dated 1/26/2023  FEV1 132 DLCO 86 Negative serum H. Pylori.  Cardiology clearance dated 1/31/2023 noting echo 8-22 showed a normal ejection fraction of 60% no diastolic dysfunction trace MR signature illegible.  Hematology note dated 11/21/2022 Dr. Hall noting on handwritten note 1/9/2022 that recommend bridging with Lovenox since she had residual thrombus on 5/22.  Once again no residual thrombus noted on that study.  Please see scanned records that I have reviewed and signed off on today.  All of this in addition to the patient's unique history and exam has been taken into consideration in determining their appropriate candidacy for MBS.    Complications  of laparoscopic/possible robotic gastric sleeve were discussed. The patient is well aware of the potential complications of surgery that include but not limited to bleeding, infections, deep venous thrombosis, pulmonary embolism, pulmonary complications such as pneumonia, cardiac events, hernias, small bowel obstruction, damage to the spleen or other organs, bowel injury, disfiguring scars, failure to lose weight, need for additional surgery, conversion to an open procedure, and death. Patient is also aware of complications which apply in this particular procedure that can include but are not limited to a \"leak\" at the staple line which in some instances may require conversion to gastric bypass.    The patient is aware if a hiatal hernia is encountered, it likely will be repaired.  R/B/A Rx to hiatal hernia repair were discussed as outlined in our long consent form.  Briefly risks in addition to those for LSG include recurrent hernia, CRYSTAL, " dysphagia, esophageal injury, pneumothorax, injury to the vagus nerves, injury to the thoracic duct, aorta or vena cava.    I discussed avoiding all tobacco products, nicotine,  and second hand smoke at least 2 weeks pre-operatively and 6 weeks post-operatively to minimize the risk of sleeve leak.  This included discussing the importance of avoiding even secondhand smoke as the risk of leak is increased.  Examples discussed:  Avoid going in a house or riding in a car where someone has previously smoked in the last 2 weeks and for 6 weeks postoperatively.  Avoid living in a house where someone smokes (even if it's in a separate room/patio/attached garage, etc.).   Avoid congregating with a group of people who are smoking even if it's outside.  It is OK to be around wood burning fires and barbecue.  I explained that I do not know if marijuana has a same effects but my overall recommendation is to avoid it for 2 weeks prior in 6 weeks after surgery.     Discussed the risks, benefits and alternative therapies at great length as outlined in our extensive consent forms, consent videos, and educational teaching process under the direction of the center's .    A copy of the patient's signed informed consent is on file.      Plan:    After evaluation today I think the patient is a reasonable candidate for laparoscopic sleeve gastrectomy, hiatal hernia repair, and EGD.  Clarify bridging anticoagulation as above.    Other issues include anxiety and depression, history of carpal tunnel syndrome, elevated LDL cholesterol level, former smoker, hiatal hernia with GE reflux disease, history of DVT, low back pain, migraine headaches, heart murmur with mild mitral regurgitation, varicose veins with venous insufficiency    Thank you Hyun KELLY and Rhianna KELLY for the opportunity evaluate Mrs. Giron.      Jovanny Canada MD              Answers for HPI/ROS submitted by the patient on 4/11/2023  Please  describe your symptoms.: MD consult  Have you had these symptoms before?: No  How long have you been having these symptoms?: Greater than 2 weeks  Please list any medications you are currently taking for this condition.: Women's daily multivitamin, eliquis 5 mg tablet one time daily, Loratadine 10 mg tablet one time daily, cyclobenzaprine HCl 10 mg one time daily, stool softener laxative 250 one capsule by mouth every day as needed, hydroxyzine Grecia 25 mg capsule by mouth three times a day as needed, women's daily care probiotic, cranberry 500 mg pill daily, vitamin D3 2000 units soft gel one capsule by mouth every day, topamax 25 mg tablet one time a day, ibuprofen 800 mg tablet one tablet two times a day as needed, and Lexapro 10 mg once every day  Please describe any probable cause for these symptoms. : NA  What is the primary reason for your visit?: Other

## 2023-04-19 PROBLEM — K21.9 HIATAL HERNIA WITH GASTROESOPHAGEAL REFLUX: Status: ACTIVE | Noted: 2023-04-19

## 2023-04-19 PROBLEM — K44.9 HIATAL HERNIA WITH GASTROESOPHAGEAL REFLUX: Status: ACTIVE | Noted: 2023-04-19

## 2023-04-27 ENCOUNTER — PRE-ADMISSION TESTING (OUTPATIENT)
Dept: PREADMISSION TESTING | Facility: HOSPITAL | Age: 38
End: 2023-04-27
Payer: MEDICAID

## 2023-04-27 ENCOUNTER — TELEPHONE (OUTPATIENT)
Dept: BARIATRICS/WEIGHT MGMT | Facility: CLINIC | Age: 38
End: 2023-04-27
Payer: MEDICAID

## 2023-04-27 DIAGNOSIS — K21.9 HIATAL HERNIA WITH GASTROESOPHAGEAL REFLUX: ICD-10-CM

## 2023-04-27 DIAGNOSIS — K44.9 HIATAL HERNIA WITH GASTROESOPHAGEAL REFLUX: ICD-10-CM

## 2023-04-27 DIAGNOSIS — E66.01 MORBID OBESITY WITH BODY MASS INDEX OF 45.0-49.9 IN ADULT: ICD-10-CM

## 2023-04-27 LAB
ABO GROUP BLD: NORMAL
DEPRECATED RDW RBC AUTO: 44.6 FL (ref 37–54)
ERYTHROCYTE [DISTWIDTH] IN BLOOD BY AUTOMATED COUNT: 13.4 % (ref 12.3–15.4)
HBA1C MFR BLD: 5.2 % (ref 4.8–5.6)
HCT VFR BLD AUTO: 41.6 % (ref 34–46.6)
HGB BLD-MCNC: 13.6 G/DL (ref 12–15.9)
INR PPP: 1.04 (ref 0.89–1.12)
MCH RBC QN AUTO: 29.4 PG (ref 26.6–33)
MCHC RBC AUTO-ENTMCNC: 32.7 G/DL (ref 31.5–35.7)
MCV RBC AUTO: 90 FL (ref 79–97)
PLATELET # BLD AUTO: 323 10*3/MM3 (ref 140–450)
PMV BLD AUTO: 9 FL (ref 6–12)
PROTHROMBIN TIME: 13.5 SECONDS (ref 12.2–14.5)
RBC # BLD AUTO: 4.62 10*6/MM3 (ref 3.77–5.28)
RH BLD: POSITIVE
WBC NRBC COR # BLD: 7.14 10*3/MM3 (ref 3.4–10.8)

## 2023-04-27 PROCEDURE — 85027 COMPLETE CBC AUTOMATED: CPT

## 2023-04-27 PROCEDURE — 86901 BLOOD TYPING SEROLOGIC RH(D): CPT

## 2023-04-27 PROCEDURE — 83036 HEMOGLOBIN GLYCOSYLATED A1C: CPT

## 2023-04-27 PROCEDURE — 86900 BLOOD TYPING SEROLOGIC ABO: CPT

## 2023-04-27 PROCEDURE — 85610 PROTHROMBIN TIME: CPT

## 2023-04-27 PROCEDURE — 93005 ELECTROCARDIOGRAM TRACING: CPT

## 2023-04-27 PROCEDURE — 93010 ELECTROCARDIOGRAM REPORT: CPT | Performed by: INTERNAL MEDICINE

## 2023-04-27 PROCEDURE — 87081 CULTURE SCREEN ONLY: CPT

## 2023-04-27 PROCEDURE — 36415 COLL VENOUS BLD VENIPUNCTURE: CPT

## 2023-04-27 RX ORDER — ENOXAPARIN SODIUM 100 MG/ML
60 INJECTION SUBCUTANEOUS
Qty: 1.8 ML | Refills: 0 | Status: SHIPPED | OUTPATIENT
Start: 2023-04-27 | End: 2023-04-30

## 2023-04-27 NOTE — PAT
An arrival time for procedure was not provided during PAT visit. If patient had any questions or concerns about their arrival time, they were instructed to contact their surgeon/physician.  Additionally, if the patient referred to an arrival time that was acquired from their my chart account, patient was encouraged to verify that time with their surgeon/physician. Arrival times are NOT provided in Pre Admission Testing Department.    Patient viewed general PAT education video as instructed in their preoperative information received from their surgeon.  Patient stated the general PAT education video was viewed in its entirety and survey completed.  Copies of PAT general education handouts (Incentive Spirometry, Meds to Beds Program, Patient Belongings, Pre-op skin preparation instructions, Blood Glucose testing, Visitor policy, Surgery FAQ, Code H) distributed to patient if not printed. Education related to the PAT pass and skin preparation for surgery (if applicable) completed in PAT as a reinforcement to PAT education video. Patient instructed to return PAT pass provided today as well as completed skin preparation sheet (if applicable) on the day of procedure.     Additionally if patient had not viewed video yet but intended to view it at home or in our waiting area, then referred them to the handout with QR code/link provided during PAT visit.  Instructed patient to complete survey after viewing the video in its entirety.  Encouraged patient/family to read PAT general education handouts thoroughly and notify PAT staff with any questions or concerns. Patient verbalized understanding of all information and priority content.    Patient denies any current skin issues.     Patient to apply Chlorhexadine wipes  to surgical area (as instructed) the night before procedure and the AM of procedure. Wipes provided.    Patient instructed to drink 20 ounces of Gatorade and it needs to be completed 1 hour (for Main OR patients)  or 2 hours (scheduled  section & [facility] patients) before given arrival time for procedure (NO RED Gatorade)    Patient verbalized understanding.    Too early to draw type and screen in PAT.  Please obtain blood bank specimen in pre-op on the day of surgery.    Verified patient previously completed cardiology visit for cardiac risk assessment in preparation for upcoming procedure, completion of 12-lead ECG within six months, and risk assessment letter reviewed. No further interventions required.   CARDIAC RISK ASSESSMENT FROM 23 ON CHART.

## 2023-04-28 LAB
QT INTERVAL: 438 MS
QTC INTERVAL: 451 MS

## 2023-04-29 LAB — MRSA SPEC QL CULT: NORMAL

## 2023-05-04 ENCOUNTER — ANESTHESIA EVENT CONVERTED (OUTPATIENT)
Dept: ANESTHESIOLOGY | Facility: HOSPITAL | Age: 38
End: 2023-05-04
Payer: MEDICAID

## 2023-05-04 ENCOUNTER — ANESTHESIA (OUTPATIENT)
Dept: PERIOP | Facility: HOSPITAL | Age: 38
End: 2023-05-04
Payer: MEDICAID

## 2023-05-04 ENCOUNTER — HOSPITAL ENCOUNTER (INPATIENT)
Facility: HOSPITAL | Age: 38
LOS: 1 days | Discharge: HOME | End: 2023-05-05
Attending: SURGERY | Admitting: SURGERY
Payer: MEDICAID

## 2023-05-04 ENCOUNTER — ANESTHESIA EVENT (OUTPATIENT)
Dept: PERIOP | Facility: HOSPITAL | Age: 38
End: 2023-05-04
Payer: MEDICAID

## 2023-05-04 DIAGNOSIS — K44.9 HIATAL HERNIA WITH GASTROESOPHAGEAL REFLUX: ICD-10-CM

## 2023-05-04 DIAGNOSIS — K21.9 HIATAL HERNIA WITH GASTROESOPHAGEAL REFLUX: ICD-10-CM

## 2023-05-04 DIAGNOSIS — E66.01 MORBID OBESITY WITH BODY MASS INDEX OF 45.0-49.9 IN ADULT: ICD-10-CM

## 2023-05-04 LAB
ABO GROUP BLD: NORMAL
BLD GP AB SCN SERPL QL: NEGATIVE
RH BLD: POSITIVE
T&S EXPIRATION DATE: NORMAL

## 2023-05-04 PROCEDURE — 86900 BLOOD TYPING SEROLOGIC ABO: CPT | Performed by: SURGERY

## 2023-05-04 PROCEDURE — 25010000002 AMISULPRIDE (ANTIEMETIC) 10 MG/4ML SOLUTION

## 2023-05-04 PROCEDURE — 86901 BLOOD TYPING SEROLOGIC RH(D): CPT | Performed by: SURGERY

## 2023-05-04 PROCEDURE — 25010000002 SUGAMMADEX 200 MG/2ML SOLUTION

## 2023-05-04 PROCEDURE — 25010000002 DEXAMETHASONE PER 1 MG: Performed by: ANESTHESIOLOGY

## 2023-05-04 PROCEDURE — 25010000002 HYDROMORPHONE 1 MG/ML SOLUTION: Performed by: SURGERY

## 2023-05-04 PROCEDURE — 0DJ08ZZ INSPECTION OF UPPER INTESTINAL TRACT, VIA NATURAL OR ARTIFICIAL OPENING ENDOSCOPIC: ICD-10-PCS | Performed by: SURGERY

## 2023-05-04 PROCEDURE — 86850 RBC ANTIBODY SCREEN: CPT | Performed by: SURGERY

## 2023-05-04 PROCEDURE — 25010000002 FENTANYL CITRATE (PF) 50 MCG/ML SOLUTION

## 2023-05-04 PROCEDURE — 25010000002 ONDANSETRON PER 1 MG: Performed by: SURGERY

## 2023-05-04 PROCEDURE — 25010000002 ONDANSETRON PER 1 MG

## 2023-05-04 PROCEDURE — 0BQT4ZZ REPAIR DIAPHRAGM, PERCUTANEOUS ENDOSCOPIC APPROACH: ICD-10-PCS | Performed by: SURGERY

## 2023-05-04 PROCEDURE — 25010000002 FENTANYL CITRATE (PF) 100 MCG/2ML SOLUTION

## 2023-05-04 PROCEDURE — 25010000002 GLUCAGON (HUMAN RECOMBINANT) 1 MG RECONSTITUTED SOLUTION

## 2023-05-04 PROCEDURE — C9399 UNCLASSIFIED DRUGS OR BIOLOG: HCPCS

## 2023-05-04 PROCEDURE — 25010000002 PROPOFOL 10 MG/ML EMULSION: Performed by: ANESTHESIOLOGY

## 2023-05-04 PROCEDURE — 88307 TISSUE EXAM BY PATHOLOGIST: CPT | Performed by: SURGERY

## 2023-05-04 PROCEDURE — 25010000002 CEFAZOLIN PER 500 MG: Performed by: SURGERY

## 2023-05-04 PROCEDURE — 94799 UNLISTED PULMONARY SVC/PX: CPT

## 2023-05-04 PROCEDURE — 25010000002 ENOXAPARIN PER 10 MG: Performed by: SURGERY

## 2023-05-04 PROCEDURE — 43775 LAP SLEEVE GASTRECTOMY: CPT | Performed by: SURGERY

## 2023-05-04 PROCEDURE — 25010000002 DEXAMETHASONE SODIUM PHOSPHATE 10 MG/ML SOLUTION: Performed by: ANESTHESIOLOGY

## 2023-05-04 PROCEDURE — 25010000002 HYDROMORPHONE 1 MG/ML SOLUTION

## 2023-05-04 PROCEDURE — 0DB64Z3 EXCISION OF STOMACH, PERCUTANEOUS ENDOSCOPIC APPROACH, VERTICAL: ICD-10-PCS | Performed by: SURGERY

## 2023-05-04 DEVICE — IMPLANTABLE DEVICE
Type: IMPLANTABLE DEVICE | Site: ABDOMEN | Status: FUNCTIONAL
Brand: TITAN SGS STANDARD GASTRIC STAPLER

## 2023-05-04 DEVICE — ABSORBABLE WOUND CLOSURE DEVICE
Type: IMPLANTABLE DEVICE | Site: ABDOMEN | Status: FUNCTIONAL
Brand: SYNETURE

## 2023-05-04 RX ORDER — SCOLOPAMINE TRANSDERMAL SYSTEM 1 MG/1
1 PATCH, EXTENDED RELEASE TRANSDERMAL ONCE
Status: DISCONTINUED | OUTPATIENT
Start: 2023-05-04 | End: 2023-05-04

## 2023-05-04 RX ORDER — SODIUM CHLORIDE 9 MG/ML
40 INJECTION, SOLUTION INTRAVENOUS AS NEEDED
Status: DISCONTINUED | OUTPATIENT
Start: 2023-05-04 | End: 2023-05-04 | Stop reason: HOSPADM

## 2023-05-04 RX ORDER — ONDANSETRON 2 MG/ML
INJECTION INTRAMUSCULAR; INTRAVENOUS AS NEEDED
Status: DISCONTINUED | OUTPATIENT
Start: 2023-05-04 | End: 2023-05-04 | Stop reason: SURG

## 2023-05-04 RX ORDER — HYDRALAZINE HYDROCHLORIDE 20 MG/ML
10 INJECTION INTRAMUSCULAR; INTRAVENOUS
Status: DISCONTINUED | OUTPATIENT
Start: 2023-05-04 | End: 2023-05-05 | Stop reason: HOSPADM

## 2023-05-04 RX ORDER — DEXAMETHASONE SODIUM PHOSPHATE 10 MG/ML
INJECTION, SOLUTION INTRAMUSCULAR; INTRAVENOUS
Status: COMPLETED | OUTPATIENT
Start: 2023-05-04 | End: 2023-05-04

## 2023-05-04 RX ORDER — FIBRINOGEN HUMAN, HUMAN THROMBIN 10 ML
KIT TOPICAL AS NEEDED
Status: DISCONTINUED | OUTPATIENT
Start: 2023-05-04 | End: 2023-05-04 | Stop reason: HOSPADM

## 2023-05-04 RX ORDER — CYANOCOBALAMIN 1000 UG/ML
1000 INJECTION, SOLUTION INTRAMUSCULAR; SUBCUTANEOUS ONCE
Status: COMPLETED | OUTPATIENT
Start: 2023-05-05 | End: 2023-05-05

## 2023-05-04 RX ORDER — ACETAMINOPHEN 500 MG
1000 TABLET ORAL ONCE
Status: COMPLETED | OUTPATIENT
Start: 2023-05-04 | End: 2023-05-04

## 2023-05-04 RX ORDER — MAGNESIUM HYDROXIDE 1200 MG/15ML
LIQUID ORAL AS NEEDED
Status: DISCONTINUED | OUTPATIENT
Start: 2023-05-04 | End: 2023-05-04 | Stop reason: HOSPADM

## 2023-05-04 RX ORDER — ENOXAPARIN SODIUM 100 MG/ML
40 INJECTION SUBCUTANEOUS EVERY 12 HOURS SCHEDULED
Status: DISCONTINUED | OUTPATIENT
Start: 2023-05-05 | End: 2023-05-05 | Stop reason: HOSPADM

## 2023-05-04 RX ORDER — LIDOCAINE HYDROCHLORIDE 10 MG/ML
0.5 INJECTION, SOLUTION EPIDURAL; INFILTRATION; INTRACAUDAL; PERINEURAL ONCE AS NEEDED
Status: COMPLETED | OUTPATIENT
Start: 2023-05-04 | End: 2023-05-04

## 2023-05-04 RX ORDER — CEFAZOLIN SODIUM IN 0.9 % NACL 3 G/100 ML
3 INTRAVENOUS SOLUTION, PIGGYBACK (ML) INTRAVENOUS ONCE
Status: COMPLETED | OUTPATIENT
Start: 2023-05-04 | End: 2023-05-04

## 2023-05-04 RX ORDER — ACETAMINOPHEN 500 MG
1000 TABLET ORAL EVERY 8 HOURS SCHEDULED
Status: DISCONTINUED | OUTPATIENT
Start: 2023-05-04 | End: 2023-05-05 | Stop reason: HOSPADM

## 2023-05-04 RX ORDER — DEXAMETHASONE SODIUM PHOSPHATE 4 MG/ML
INJECTION, SOLUTION INTRA-ARTICULAR; INTRALESIONAL; INTRAMUSCULAR; INTRAVENOUS; SOFT TISSUE AS NEEDED
Status: DISCONTINUED | OUTPATIENT
Start: 2023-05-04 | End: 2023-05-04 | Stop reason: SURG

## 2023-05-04 RX ORDER — MEPERIDINE HYDROCHLORIDE 25 MG/ML
12.5 INJECTION INTRAMUSCULAR; INTRAVENOUS; SUBCUTANEOUS
Status: DISCONTINUED | OUTPATIENT
Start: 2023-05-04 | End: 2023-05-04 | Stop reason: HOSPADM

## 2023-05-04 RX ORDER — ACETAMINOPHEN 160 MG/5ML
1000 SOLUTION ORAL EVERY 8 HOURS SCHEDULED
Status: DISCONTINUED | OUTPATIENT
Start: 2023-05-04 | End: 2023-05-05 | Stop reason: HOSPADM

## 2023-05-04 RX ORDER — MORPHINE SULFATE 4 MG/ML
4 INJECTION, SOLUTION INTRAMUSCULAR; INTRAVENOUS
Status: DISCONTINUED | OUTPATIENT
Start: 2023-05-04 | End: 2023-05-05 | Stop reason: HOSPADM

## 2023-05-04 RX ORDER — ALPRAZOLAM 0.25 MG/1
0.25 TABLET ORAL ONCE AS NEEDED
Status: DISCONTINUED | OUTPATIENT
Start: 2023-05-04 | End: 2023-05-05 | Stop reason: HOSPADM

## 2023-05-04 RX ORDER — FENTANYL CITRATE 50 UG/ML
50 INJECTION, SOLUTION INTRAMUSCULAR; INTRAVENOUS
Status: DISCONTINUED | OUTPATIENT
Start: 2023-05-04 | End: 2023-05-04 | Stop reason: HOSPADM

## 2023-05-04 RX ORDER — GABAPENTIN 300 MG/1
600 CAPSULE ORAL ONCE
Status: COMPLETED | OUTPATIENT
Start: 2023-05-04 | End: 2023-05-04

## 2023-05-04 RX ORDER — DROPERIDOL 2.5 MG/ML
0.62 INJECTION, SOLUTION INTRAMUSCULAR; INTRAVENOUS ONCE AS NEEDED
Status: DISCONTINUED | OUTPATIENT
Start: 2023-05-04 | End: 2023-05-04 | Stop reason: HOSPADM

## 2023-05-04 RX ORDER — HYDROCODONE BITARTRATE AND ACETAMINOPHEN 5; 325 MG/1; MG/1
TABLET ORAL
Status: COMPLETED
Start: 2023-05-04 | End: 2023-05-04

## 2023-05-04 RX ORDER — CETIRIZINE HYDROCHLORIDE 10 MG/1
10 TABLET ORAL NIGHTLY
Status: DISCONTINUED | OUTPATIENT
Start: 2023-05-04 | End: 2023-05-05 | Stop reason: HOSPADM

## 2023-05-04 RX ORDER — PROMETHAZINE HYDROCHLORIDE 25 MG/1
25 SUPPOSITORY RECTAL ONCE AS NEEDED
Status: DISCONTINUED | OUTPATIENT
Start: 2023-05-04 | End: 2023-05-04 | Stop reason: HOSPADM

## 2023-05-04 RX ORDER — TOPIRAMATE 25 MG/1
25 TABLET ORAL DAILY
Status: DISCONTINUED | OUTPATIENT
Start: 2023-05-04 | End: 2023-05-04

## 2023-05-04 RX ORDER — SIMETHICONE 80 MG
80 TABLET,CHEWABLE ORAL 4 TIMES DAILY PRN
Status: DISCONTINUED | OUTPATIENT
Start: 2023-05-04 | End: 2023-05-05 | Stop reason: HOSPADM

## 2023-05-04 RX ORDER — GABAPENTIN 250 MG/5ML
100 SOLUTION ORAL 3 TIMES DAILY
Status: DISCONTINUED | OUTPATIENT
Start: 2023-05-04 | End: 2023-05-05 | Stop reason: HOSPADM

## 2023-05-04 RX ORDER — ENOXAPARIN SODIUM 100 MG/ML
60 INJECTION SUBCUTANEOUS DAILY
COMMUNITY
Start: 2023-05-01 | End: 2023-05-05 | Stop reason: HOSPADM

## 2023-05-04 RX ORDER — SODIUM CHLORIDE 0.9 % (FLUSH) 0.9 %
10 SYRINGE (ML) INJECTION EVERY 12 HOURS SCHEDULED
Status: DISCONTINUED | OUTPATIENT
Start: 2023-05-04 | End: 2023-05-04

## 2023-05-04 RX ORDER — HYDROXYZINE PAMOATE 25 MG/1
25 CAPSULE ORAL 3 TIMES DAILY PRN
Status: DISCONTINUED | OUTPATIENT
Start: 2023-05-04 | End: 2023-05-04

## 2023-05-04 RX ORDER — SODIUM CHLORIDE 9 MG/ML
40 INJECTION, SOLUTION INTRAVENOUS AS NEEDED
Status: DISCONTINUED | OUTPATIENT
Start: 2023-05-04 | End: 2023-05-04

## 2023-05-04 RX ORDER — FENTANYL CITRATE 50 UG/ML
INJECTION, SOLUTION INTRAMUSCULAR; INTRAVENOUS AS NEEDED
Status: DISCONTINUED | OUTPATIENT
Start: 2023-05-04 | End: 2023-05-04 | Stop reason: SURG

## 2023-05-04 RX ORDER — FAMOTIDINE 10 MG/ML
20 INJECTION, SOLUTION INTRAVENOUS ONCE
Status: DISCONTINUED | OUTPATIENT
Start: 2023-05-04 | End: 2023-05-04

## 2023-05-04 RX ORDER — FENTANYL CITRATE 50 UG/ML
INJECTION, SOLUTION INTRAMUSCULAR; INTRAVENOUS
Status: COMPLETED
Start: 2023-05-04 | End: 2023-05-04

## 2023-05-04 RX ORDER — DROPERIDOL 2.5 MG/ML
0.62 INJECTION, SOLUTION INTRAMUSCULAR; INTRAVENOUS
Status: DISCONTINUED | OUTPATIENT
Start: 2023-05-04 | End: 2023-05-04 | Stop reason: HOSPADM

## 2023-05-04 RX ORDER — SODIUM CHLORIDE AND POTASSIUM CHLORIDE 150; 450 MG/100ML; MG/100ML
125 INJECTION, SOLUTION INTRAVENOUS CONTINUOUS
Status: DISCONTINUED | OUTPATIENT
Start: 2023-05-05 | End: 2023-05-05 | Stop reason: HOSPADM

## 2023-05-04 RX ORDER — SODIUM CHLORIDE, SODIUM LACTATE, POTASSIUM CHLORIDE, CALCIUM CHLORIDE 600; 310; 30; 20 MG/100ML; MG/100ML; MG/100ML; MG/100ML
150 INJECTION, SOLUTION INTRAVENOUS CONTINUOUS
Status: DISCONTINUED | OUTPATIENT
Start: 2023-05-04 | End: 2023-05-05 | Stop reason: HOSPADM

## 2023-05-04 RX ORDER — ONDANSETRON 4 MG/1
4 TABLET, FILM COATED ORAL EVERY 6 HOURS PRN
Status: DISCONTINUED | OUTPATIENT
Start: 2023-05-08 | End: 2023-05-05 | Stop reason: HOSPADM

## 2023-05-04 RX ORDER — ONDANSETRON 2 MG/ML
4 INJECTION INTRAMUSCULAR; INTRAVENOUS ONCE AS NEEDED
Status: DISCONTINUED | OUTPATIENT
Start: 2023-05-04 | End: 2023-05-04 | Stop reason: HOSPADM

## 2023-05-04 RX ORDER — FAMOTIDINE 20 MG/1
20 TABLET, FILM COATED ORAL ONCE
Status: DISCONTINUED | OUTPATIENT
Start: 2023-05-04 | End: 2023-05-04

## 2023-05-04 RX ORDER — NALOXONE HCL 0.4 MG/ML
0.4 VIAL (ML) INJECTION
Status: DISCONTINUED | OUTPATIENT
Start: 2023-05-04 | End: 2023-05-05 | Stop reason: HOSPADM

## 2023-05-04 RX ORDER — LORAZEPAM 1 MG/1
1 TABLET ORAL EVERY 12 HOURS PRN
Status: DISCONTINUED | OUTPATIENT
Start: 2023-05-04 | End: 2023-05-05 | Stop reason: HOSPADM

## 2023-05-04 RX ORDER — HYDROXYZINE HYDROCHLORIDE 25 MG/1
25 TABLET, FILM COATED ORAL 3 TIMES DAILY PRN
Status: DISCONTINUED | OUTPATIENT
Start: 2023-05-04 | End: 2023-05-05 | Stop reason: HOSPADM

## 2023-05-04 RX ORDER — LABETALOL HYDROCHLORIDE 5 MG/ML
5 INJECTION, SOLUTION INTRAVENOUS
Status: DISCONTINUED | OUTPATIENT
Start: 2023-05-04 | End: 2023-05-04 | Stop reason: HOSPADM

## 2023-05-04 RX ORDER — SIMETHICONE 80 MG
TABLET,CHEWABLE ORAL
Status: COMPLETED
Start: 2023-05-04 | End: 2023-05-04

## 2023-05-04 RX ORDER — CEFAZOLIN SODIUM IN 0.9 % NACL 3 G/100 ML
3 INTRAVENOUS SOLUTION, PIGGYBACK (ML) INTRAVENOUS EVERY 8 HOURS
Status: COMPLETED | OUTPATIENT
Start: 2023-05-04 | End: 2023-05-05

## 2023-05-04 RX ORDER — ENOXAPARIN SODIUM 100 MG/ML
INJECTION SUBCUTANEOUS AS NEEDED
Status: DISCONTINUED | OUTPATIENT
Start: 2023-05-04 | End: 2023-05-04 | Stop reason: HOSPADM

## 2023-05-04 RX ORDER — PROCHLORPERAZINE MALEATE 10 MG
10 TABLET ORAL EVERY 6 HOURS PRN
Status: DISCONTINUED | OUTPATIENT
Start: 2023-05-04 | End: 2023-05-05 | Stop reason: HOSPADM

## 2023-05-04 RX ORDER — PANTOPRAZOLE SODIUM 40 MG/10ML
40 INJECTION, POWDER, LYOPHILIZED, FOR SOLUTION INTRAVENOUS ONCE
Status: COMPLETED | OUTPATIENT
Start: 2023-05-04 | End: 2023-05-04

## 2023-05-04 RX ORDER — PROMETHAZINE HYDROCHLORIDE 25 MG/1
25 TABLET ORAL ONCE AS NEEDED
Status: DISCONTINUED | OUTPATIENT
Start: 2023-05-04 | End: 2023-05-04 | Stop reason: HOSPADM

## 2023-05-04 RX ORDER — SODIUM CHLORIDE, SODIUM LACTATE, POTASSIUM CHLORIDE, CALCIUM CHLORIDE 600; 310; 30; 20 MG/100ML; MG/100ML; MG/100ML; MG/100ML
150 INJECTION, SOLUTION INTRAVENOUS CONTINUOUS
Status: DISCONTINUED | OUTPATIENT
Start: 2023-05-04 | End: 2023-05-04

## 2023-05-04 RX ORDER — LORAZEPAM 2 MG/ML
0.5 INJECTION INTRAMUSCULAR EVERY 12 HOURS PRN
Status: DISCONTINUED | OUTPATIENT
Start: 2023-05-04 | End: 2023-05-05 | Stop reason: HOSPADM

## 2023-05-04 RX ORDER — HYDROCODONE BITARTRATE AND ACETAMINOPHEN 5; 325 MG/1; MG/1
1 TABLET ORAL ONCE AS NEEDED
Status: DISCONTINUED | OUTPATIENT
Start: 2023-05-04 | End: 2023-05-04 | Stop reason: HOSPADM

## 2023-05-04 RX ORDER — SODIUM CHLORIDE 0.9 % (FLUSH) 0.9 %
3 SYRINGE (ML) INJECTION EVERY 12 HOURS SCHEDULED
Status: DISCONTINUED | OUTPATIENT
Start: 2023-05-04 | End: 2023-05-04

## 2023-05-04 RX ORDER — HYDRALAZINE HYDROCHLORIDE 20 MG/ML
5 INJECTION INTRAMUSCULAR; INTRAVENOUS
Status: DISCONTINUED | OUTPATIENT
Start: 2023-05-04 | End: 2023-05-04 | Stop reason: HOSPADM

## 2023-05-04 RX ORDER — IPRATROPIUM BROMIDE AND ALBUTEROL SULFATE 2.5; .5 MG/3ML; MG/3ML
3 SOLUTION RESPIRATORY (INHALATION) ONCE AS NEEDED
Status: DISCONTINUED | OUTPATIENT
Start: 2023-05-04 | End: 2023-05-04 | Stop reason: HOSPADM

## 2023-05-04 RX ORDER — ESCITALOPRAM OXALATE 10 MG/1
10 TABLET ORAL DAILY
Status: DISCONTINUED | OUTPATIENT
Start: 2023-05-04 | End: 2023-05-04

## 2023-05-04 RX ORDER — HYDROMORPHONE HYDROCHLORIDE 2 MG/1
2 TABLET ORAL EVERY 4 HOURS PRN
Status: DISCONTINUED | OUTPATIENT
Start: 2023-05-04 | End: 2023-05-05 | Stop reason: HOSPADM

## 2023-05-04 RX ORDER — NALOXONE HCL 0.4 MG/ML
0.1 VIAL (ML) INJECTION
Status: DISCONTINUED | OUTPATIENT
Start: 2023-05-04 | End: 2023-05-05 | Stop reason: HOSPADM

## 2023-05-04 RX ORDER — PROPOFOL 10 MG/ML
VIAL (ML) INTRAVENOUS AS NEEDED
Status: DISCONTINUED | OUTPATIENT
Start: 2023-05-04 | End: 2023-05-04 | Stop reason: SURG

## 2023-05-04 RX ORDER — ALBUTEROL SULFATE 2.5 MG/3ML
2.5 SOLUTION RESPIRATORY (INHALATION) EVERY 4 HOURS PRN
Status: DISCONTINUED | OUTPATIENT
Start: 2023-05-04 | End: 2023-05-05 | Stop reason: HOSPADM

## 2023-05-04 RX ORDER — BUPIVACAINE HYDROCHLORIDE 2.5 MG/ML
INJECTION, SOLUTION EPIDURAL; INFILTRATION; INTRACAUDAL
Status: COMPLETED | OUTPATIENT
Start: 2023-05-04 | End: 2023-05-04

## 2023-05-04 RX ORDER — SODIUM CHLORIDE 0.9 % (FLUSH) 0.9 %
3 SYRINGE (ML) INJECTION EVERY 12 HOURS SCHEDULED
Status: DISCONTINUED | OUTPATIENT
Start: 2023-05-04 | End: 2023-05-04 | Stop reason: HOSPADM

## 2023-05-04 RX ORDER — LIDOCAINE HYDROCHLORIDE 10 MG/ML
INJECTION, SOLUTION EPIDURAL; INFILTRATION; INTRACAUDAL; PERINEURAL AS NEEDED
Status: DISCONTINUED | OUTPATIENT
Start: 2023-05-04 | End: 2023-05-04 | Stop reason: SURG

## 2023-05-04 RX ORDER — SODIUM CHLORIDE, SODIUM LACTATE, POTASSIUM CHLORIDE, CALCIUM CHLORIDE 600; 310; 30; 20 MG/100ML; MG/100ML; MG/100ML; MG/100ML
9 INJECTION, SOLUTION INTRAVENOUS CONTINUOUS
Status: DISCONTINUED | OUTPATIENT
Start: 2023-05-04 | End: 2023-05-04

## 2023-05-04 RX ORDER — TOPIRAMATE 25 MG/1
25 TABLET ORAL NIGHTLY
Status: DISCONTINUED | OUTPATIENT
Start: 2023-05-04 | End: 2023-05-05 | Stop reason: HOSPADM

## 2023-05-04 RX ORDER — CHLORHEXIDINE GLUCONATE 0.12 MG/ML
30 RINSE ORAL
Status: COMPLETED | OUTPATIENT
Start: 2023-05-04 | End: 2023-05-04

## 2023-05-04 RX ORDER — NALOXONE HCL 0.4 MG/ML
0.4 VIAL (ML) INJECTION AS NEEDED
Status: DISCONTINUED | OUTPATIENT
Start: 2023-05-04 | End: 2023-05-04 | Stop reason: HOSPADM

## 2023-05-04 RX ORDER — SODIUM CHLORIDE 9 MG/ML
INJECTION, SOLUTION INTRAVENOUS AS NEEDED
Status: DISCONTINUED | OUTPATIENT
Start: 2023-05-04 | End: 2023-05-04 | Stop reason: HOSPADM

## 2023-05-04 RX ORDER — OXYCODONE HYDROCHLORIDE 5 MG/1
5 TABLET ORAL EVERY 6 HOURS PRN
Status: DISCONTINUED | OUTPATIENT
Start: 2023-05-04 | End: 2023-05-05 | Stop reason: HOSPADM

## 2023-05-04 RX ORDER — GABAPENTIN 100 MG/1
100 CAPSULE ORAL 3 TIMES DAILY
Status: DISCONTINUED | OUTPATIENT
Start: 2023-05-04 | End: 2023-05-05 | Stop reason: HOSPADM

## 2023-05-04 RX ORDER — CETIRIZINE HYDROCHLORIDE 10 MG/1
10 TABLET ORAL DAILY
Status: DISCONTINUED | OUTPATIENT
Start: 2023-05-05 | End: 2023-05-04

## 2023-05-04 RX ORDER — DIPHENHYDRAMINE HYDROCHLORIDE 50 MG/ML
25 INJECTION INTRAMUSCULAR; INTRAVENOUS EVERY 4 HOURS PRN
Status: DISCONTINUED | OUTPATIENT
Start: 2023-05-04 | End: 2023-05-05 | Stop reason: HOSPADM

## 2023-05-04 RX ORDER — PROMETHAZINE HYDROCHLORIDE 12.5 MG/1
12.5 TABLET ORAL EVERY 6 HOURS PRN
Status: DISCONTINUED | OUTPATIENT
Start: 2023-05-04 | End: 2023-05-05 | Stop reason: HOSPADM

## 2023-05-04 RX ORDER — MIDAZOLAM HYDROCHLORIDE 1 MG/ML
1 INJECTION INTRAMUSCULAR; INTRAVENOUS
Status: DISCONTINUED | OUTPATIENT
Start: 2023-05-04 | End: 2023-05-04 | Stop reason: HOSPADM

## 2023-05-04 RX ORDER — SODIUM CHLORIDE 0.9 % (FLUSH) 0.9 %
3-10 SYRINGE (ML) INJECTION AS NEEDED
Status: DISCONTINUED | OUTPATIENT
Start: 2023-05-04 | End: 2023-05-04

## 2023-05-04 RX ORDER — ROCURONIUM BROMIDE 10 MG/ML
INJECTION, SOLUTION INTRAVENOUS AS NEEDED
Status: DISCONTINUED | OUTPATIENT
Start: 2023-05-04 | End: 2023-05-04 | Stop reason: SURG

## 2023-05-04 RX ORDER — SODIUM CHLORIDE 0.9 % (FLUSH) 0.9 %
10 SYRINGE (ML) INJECTION AS NEEDED
Status: DISCONTINUED | OUTPATIENT
Start: 2023-05-04 | End: 2023-05-04

## 2023-05-04 RX ORDER — ESCITALOPRAM OXALATE 10 MG/1
10 TABLET ORAL NIGHTLY
Status: DISCONTINUED | OUTPATIENT
Start: 2023-05-04 | End: 2023-05-05 | Stop reason: HOSPADM

## 2023-05-04 RX ORDER — SODIUM CHLORIDE 0.9 % (FLUSH) 0.9 %
3-10 SYRINGE (ML) INJECTION AS NEEDED
Status: DISCONTINUED | OUTPATIENT
Start: 2023-05-04 | End: 2023-05-04 | Stop reason: HOSPADM

## 2023-05-04 RX ORDER — CYCLOBENZAPRINE HCL 10 MG
10 TABLET ORAL 2 TIMES DAILY PRN
Status: DISCONTINUED | OUTPATIENT
Start: 2023-05-04 | End: 2023-05-05 | Stop reason: HOSPADM

## 2023-05-04 RX ORDER — ONDANSETRON 4 MG/1
4 TABLET, FILM COATED ORAL EVERY 4 HOURS PRN
Status: DISCONTINUED | OUTPATIENT
Start: 2023-05-04 | End: 2023-05-04 | Stop reason: SDUPTHER

## 2023-05-04 RX ORDER — ONDANSETRON 2 MG/ML
4 INJECTION INTRAMUSCULAR; INTRAVENOUS EVERY 4 HOURS PRN
Status: DISCONTINUED | OUTPATIENT
Start: 2023-05-04 | End: 2023-05-05 | Stop reason: HOSPADM

## 2023-05-04 RX ORDER — ENOXAPARIN SODIUM 100 MG/ML
40 INJECTION SUBCUTANEOUS ONCE
Status: DISCONTINUED | OUTPATIENT
Start: 2023-05-04 | End: 2023-05-04 | Stop reason: HOSPADM

## 2023-05-04 RX ORDER — BUPIVACAINE HYDROCHLORIDE AND EPINEPHRINE 5; 5 MG/ML; UG/ML
INJECTION, SOLUTION PERINEURAL AS NEEDED
Status: DISCONTINUED | OUTPATIENT
Start: 2023-05-04 | End: 2023-05-04 | Stop reason: HOSPADM

## 2023-05-04 RX ORDER — PANTOPRAZOLE SODIUM 40 MG/10ML
40 INJECTION, POWDER, LYOPHILIZED, FOR SOLUTION INTRAVENOUS
Status: DISCONTINUED | OUTPATIENT
Start: 2023-05-05 | End: 2023-05-05 | Stop reason: HOSPADM

## 2023-05-04 RX ADMIN — CEFAZOLIN 3 G: 10 INJECTION, POWDER, FOR SOLUTION INTRAVENOUS at 07:33

## 2023-05-04 RX ADMIN — GLUCAGON HYDROCHLORIDE 1 MG: KIT at 08:12

## 2023-05-04 RX ADMIN — ACETAMINOPHEN 1000 MG: 500 TABLET ORAL at 21:44

## 2023-05-04 RX ADMIN — PROPOFOL 25 MCG/KG/MIN: 10 INJECTION, EMULSION INTRAVENOUS at 07:27

## 2023-05-04 RX ADMIN — SIMETHICONE 80 MG: 80 TABLET, CHEWABLE ORAL at 11:42

## 2023-05-04 RX ADMIN — BUPIVACAINE HYDROCHLORIDE 60 ML: 2.5 INJECTION, SOLUTION EPIDURAL; INFILTRATION; INTRACAUDAL; PERINEURAL at 07:27

## 2023-05-04 RX ADMIN — DEXAMETHASONE SODIUM PHOSPHATE 4 MG: 4 INJECTION, SOLUTION INTRAMUSCULAR; INTRAVENOUS at 07:27

## 2023-05-04 RX ADMIN — HYDROCODONE BITARTRATE AND ACETAMINOPHEN 1 TABLET: 5; 325 TABLET ORAL at 10:10

## 2023-05-04 RX ADMIN — GABAPENTIN 100 MG: 100 CAPSULE ORAL at 15:01

## 2023-05-04 RX ADMIN — TOPIRAMATE 25 MG: 25 TABLET, FILM COATED ORAL at 20:14

## 2023-05-04 RX ADMIN — AMISULPRIDE 10 MG: 2.5 INJECTION, SOLUTION INTRAVENOUS at 08:32

## 2023-05-04 RX ADMIN — Medication 0.5 MG: at 09:12

## 2023-05-04 RX ADMIN — GABAPENTIN 100 MG: 100 CAPSULE ORAL at 20:14

## 2023-05-04 RX ADMIN — CHLORHEXIDINE GLUCONATE 0.12% ORAL RINSE 30 ML: 1.2 LIQUID ORAL at 06:52

## 2023-05-04 RX ADMIN — Medication 10 ML: at 06:53

## 2023-05-04 RX ADMIN — SUGAMMADEX 300 MG: 100 INJECTION, SOLUTION INTRAVENOUS at 08:24

## 2023-05-04 RX ADMIN — PANTOPRAZOLE SODIUM 40 MG: 40 INJECTION, POWDER, FOR SOLUTION INTRAVENOUS at 06:53

## 2023-05-04 RX ADMIN — HYDROMORPHONE HYDROCHLORIDE 1 MG: 1 INJECTION, SOLUTION INTRAMUSCULAR; INTRAVENOUS; SUBCUTANEOUS at 14:56

## 2023-05-04 RX ADMIN — GABAPENTIN 600 MG: 300 CAPSULE ORAL at 06:52

## 2023-05-04 RX ADMIN — ESCITALOPRAM OXALATE 10 MG: 10 TABLET ORAL at 20:14

## 2023-05-04 RX ADMIN — CETIRIZINE HYDROCHLORIDE 10 MG: 10 TABLET, FILM COATED ORAL at 20:14

## 2023-05-04 RX ADMIN — ONDANSETRON 4 MG: 2 INJECTION INTRAMUSCULAR; INTRAVENOUS at 14:57

## 2023-05-04 RX ADMIN — DEXAMETHASONE SODIUM PHOSPHATE 4 MG: 4 INJECTION, SOLUTION INTRAMUSCULAR; INTRAVENOUS at 08:32

## 2023-05-04 RX ADMIN — SODIUM CHLORIDE, POTASSIUM CHLORIDE, SODIUM LACTATE AND CALCIUM CHLORIDE 1000 ML: 600; 310; 30; 20 INJECTION, SOLUTION INTRAVENOUS at 06:25

## 2023-05-04 RX ADMIN — LIDOCAINE HYDROCHLORIDE 0.5 ML: 10 INJECTION, SOLUTION EPIDURAL; INFILTRATION; INTRACAUDAL; PERINEURAL at 06:25

## 2023-05-04 RX ADMIN — LIDOCAINE HYDROCHLORIDE 50 MG: 10 INJECTION, SOLUTION EPIDURAL; INFILTRATION; INTRACAUDAL; PERINEURAL at 07:27

## 2023-05-04 RX ADMIN — ROCURONIUM BROMIDE 100 MG: 10 SOLUTION INTRAVENOUS at 07:27

## 2023-05-04 RX ADMIN — CEFAZOLIN 3 G: 10 INJECTION, POWDER, FOR SOLUTION INTRAVENOUS at 23:56

## 2023-05-04 RX ADMIN — FENTANYL CITRATE 100 MCG: 50 INJECTION, SOLUTION INTRAMUSCULAR; INTRAVENOUS at 08:24

## 2023-05-04 RX ADMIN — CHLORHEXIDINE GLUCONATE 0.12% ORAL RINSE 30 ML: 1.2 LIQUID ORAL at 06:57

## 2023-05-04 RX ADMIN — DEXAMETHASONE SODIUM PHOSPHATE 4 MG: 10 INJECTION, SOLUTION INTRAMUSCULAR; INTRAVENOUS at 07:27

## 2023-05-04 RX ADMIN — FENTANYL CITRATE 50 MCG: 50 INJECTION, SOLUTION INTRAMUSCULAR; INTRAVENOUS at 09:29

## 2023-05-04 RX ADMIN — ACETAMINOPHEN 1000 MG: 500 TABLET ORAL at 14:56

## 2023-05-04 RX ADMIN — HYDROMORPHONE HYDROCHLORIDE 0.5 MG: 1 INJECTION, SOLUTION INTRAMUSCULAR; INTRAVENOUS; SUBCUTANEOUS at 09:12

## 2023-05-04 RX ADMIN — SCOPALAMINE 1 PATCH: 1 PATCH, EXTENDED RELEASE TRANSDERMAL at 06:52

## 2023-05-04 RX ADMIN — SODIUM CHLORIDE, POTASSIUM CHLORIDE, SODIUM LACTATE AND CALCIUM CHLORIDE 150 ML/HR: 600; 310; 30; 20 INJECTION, SOLUTION INTRAVENOUS at 20:13

## 2023-05-04 RX ADMIN — CEFAZOLIN 3 G: 10 INJECTION, POWDER, FOR SOLUTION INTRAVENOUS at 14:57

## 2023-05-04 RX ADMIN — ONDANSETRON 4 MG: 2 INJECTION INTRAMUSCULAR; INTRAVENOUS at 08:36

## 2023-05-04 RX ADMIN — SODIUM CHLORIDE, POTASSIUM CHLORIDE, SODIUM LACTATE AND CALCIUM CHLORIDE 150 ML/HR: 600; 310; 30; 20 INJECTION, SOLUTION INTRAVENOUS at 06:59

## 2023-05-04 RX ADMIN — ACETAMINOPHEN 1000 MG: 500 TABLET ORAL at 06:51

## 2023-05-04 RX ADMIN — PROPOFOL 200 MG: 10 INJECTION, EMULSION INTRAVENOUS at 07:27

## 2023-05-04 RX ADMIN — SIMETHICONE 80 MG: 80 TABLET, CHEWABLE ORAL at 20:13

## 2023-05-04 NOTE — ANESTHESIA PROCEDURE NOTES
Airway  Urgency: elective    Date/Time: 5/4/2023 7:28 AM    General Information and Staff    Patient location during procedure: OR  CRNA/CAA: Schirmer, Shelley P, CRNA  SRNA: James Madrigal SRNA  Indications and Patient Condition  Indications for airway management: airway protection    Preoxygenated: yes  MILS maintained throughout  Mask difficulty assessment: 1 - vent by mask    Final Airway Details  Final airway type: endotracheal airway      Successful airway: ETT  Cuffed: yes   Successful intubation technique: direct laryngoscopy  Facilitating devices/methods: intubating stylet  Endotracheal tube insertion site: oral  Blade: Donn  Blade size: 3  ETT size (mm): 7.0  Cormack-Lehane Classification: grade I - full view of glottis  Placement verified by: chest auscultation and capnometry   Cuff volume (mL): 8  Measured from: lips  ETT/EBT  to lips (cm): 23  Number of attempts at approach: 1  Assessment: lips, teeth, and gum same as pre-op and atraumatic intubation

## 2023-05-04 NOTE — ANESTHESIA PROCEDURE NOTES
"Peripheral Block      Patient reassessed immediately prior to procedure    Patient location during procedure: OR  Start time: 5/4/2023 7:27 AM  Stop time: 5/4/2023 7:35 AM  Reason for block: at surgeon's request and post-op pain management  Performed by  CRNA/CAA: Juana Nugent, IRA  Assisted by: James Madrigal SRNA  Preanesthetic Checklist  Completed: patient identified, IV checked, site marked, risks and benefits discussed, surgical consent, monitors and equipment checked, pre-op evaluation and timeout performed  Prep:  Pt Position: supine  Sterile barriers:cap, gloves, mask and washed/disinfected hands  Prep: ChloraPrep  Patient monitoring: blood pressure monitoring, continuous pulse oximetry and EKG  Procedure    Sedation: yes  Performed under: general  Guidance:ultrasound guided  Images:still images obtained, printed/placed on chart    Laterality:Bilateral  Block Type:TAP  Injection Technique:single-shot  Needle Type:short-bevel and echogenic  Needle Gauge:20 G  Resistance on Injection: none    Medications Used: bupivacaine PF (MARCAINE) 0.25 % injection - Injection   60 mL - 5/4/2023 7:27:00 AM  dexamethasone sodium phosphate injection - Injection   4 mg - 5/4/2023 7:27:00 AM      Medications  Comment:Block Injection:  LA dose divided between Right and Left block        Post Assessment  Injection Assessment: negative aspiration for heme, incremental injection and no paresthesia on injection  Patient Tolerance:comfortable throughout block  Complications:no  Additional Notes    Subcostal TAPs    A high-frequency linear transducer, with sterile cover, was placed sub-xiphoid to identify Line[place] Alba, right and left Rectus Abdominus Muscles (RAYNA). The transducer was moved either right or left subcostally to identify the RAYNA and the Transverse Abdominus Muscle (VALENCIA). The insertion site was prepped in sterile fashion and then localized with 2-5 ml of 1% Lidocaine. Using ultrasound-guidance, a 20-gauge B-Malone 4\" Ultraplex " "360 non-stimulating echogenic needle was advanced in plane, from medial to lateral, until the tip of the needle was in the fascial plane between the RAYNA and VALENCIA. 1-3ml of preservative free normal saline was used to hydro-dissect the fascial planes. After the fascial plane was verified, the local anesthetic (LA) was injected. The procedure was repeated on the opposite side for bilateral coverage. Aspiration every 5 ml to prevent intravascular injection. Injection was completed with negative aspiration of blood and negative intravascular injection. Injection pressures were normal with minimal resistance. The subcostal approach to the TAP nerve block ideally anesthetizes the intercostal nerves T6-T9.     Mid-Axillary/Lateral TAPs    A high-frequency linear transducer, with sterile cover, was placed in the midaxillary line between the ASIS and costal margin. The External Oblique Muscle (EOM), Internal Oblique Muscle (IOM), Transverse Abdominus Muscle (VALENCIA), and Peritoneum were identified. The insertion site was prepped in sterile fashion and then localized with 2-5 ml of 1% Lidocaine. Using ultrasound-guidance, a 20-gauge B-Malone 4\" Ultraplex 360 non-stimulating echogenic needle was advanced in plane, from medial to lateral, until the tip of the needle was in the fascial plane between the IOM and VALENCIA. 1-3ml of preservative free normal saline was used to hydro-dissect the fascial planes. After the fascial plane was verified, the local anesthetic (LA) was injected. The procedure was repeated on the opposite side for bilateral coverage. Aspiration every 5 ml to prevent intravascular injection. Injection was completed with negative aspiration of blood and negative intravascular injection. Injection pressures were normal with minimal resistance. Midaxillary TAPs should reach intercostal nerves T10- T11 and the subcostal nerve T12.            "

## 2023-05-04 NOTE — INTERVAL H&P NOTE
H&P updated. The patient was examined and the following changes are noted:  Hematology did recommend bridging anticoagulation to be written by us, pt says she had her last lovenox injection yesterday

## 2023-05-04 NOTE — ANESTHESIA POSTPROCEDURE EVALUATION
Patient: Beverly Giron    Procedure Summary     Date: 05/04/23 Room / Location:  [facility] / [facility]    Anesthesia Start: 0723 Anesthesia Stop: 0903    Procedures:       GASTRIC SLEEVE LAPAROSCOPIC, HIATAL HERNIA REPAIR LAPAROSCOPIC (Abdomen)      ESOPHAGOGASTRODUODENOSCOPY (Esophagus) Diagnosis:       Morbid obesity with body mass index of 45.0-49.9 in adult      Hiatal hernia with gastroesophageal reflux      (Morbid obesity with body mass index of 45.0-49.9 in adult [E66.01, Z68.42])      (Hiatal hernia with gastroesophageal reflux [K44.9, K21.9])    Surgeons: Jovanny Canada MD Provider: Naresh Eaton MD    Anesthesia Type: general ASA Status: 3          Anesthesia Type: general    Vitals  Vitals Value Taken Time   /88    Temp 97    Pulse 109 05/04/23 0901   Resp 12    SpO2 100 % 05/04/23 0901   Vitals shown include unvalidated device data.        Post Anesthesia Care and Evaluation    Patient location during evaluation: PACU  Patient participation: complete - patient participated  Level of consciousness: awake and alert  Pain score: 0  Pain management: adequate    Airway patency: patent  Anesthetic complications: No anesthetic complications  PONV Status: none  Cardiovascular status: hemodynamically stable and acceptable  Respiratory status: nonlabored ventilation, acceptable and nasal cannula  Hydration status: acceptable

## 2023-05-04 NOTE — OP NOTE
Preoperative Diagnosis:   Morbid obesity (127 kg, BMI 47.40)    with Multiple Co-Morbidities              Hiatal hernia with GE reflux disease and eosinophilic esophagitis    Postoperative Diagnosis:   Same    Procedure:                                                      Laparoscopic Sleeve Gastrectomy (85% subtotal vertical gastrectomy, Titan (7FFAC)                                  Laparoscopic hiatal hernia repair (not paraesophageal)                                                                                                                     EGD                                                                       Surgeon:                                                       TUAN Canada MD    Anesthesia:                                                   GETA    EBL:                                                              Minimal    Fluids:                                                           Crystalloid    Specimens:                                                   Subtotal gastrectomy    Drains:                                                           None    Counts:                                                          Correct    Complications:                                               None    Indications:   This is a 38 year-old morbidly obese female who presents for elective laparoscopic sleeve gastrectomy, hiatal hernia repair, and EGD.  She's undergone our extensive preoperative education teaching and consent process everything's in order and she wishes to proceed.      Operative technique:     The patient was brought to the operating room, and placed supine upon the operating room table. SCD hose were placed, she underwent uneventful general endotracheal anesthesia per the anesthesiology staff, she received IV Ancef, and subcutaneous Lovenox, the anesthesiology staff performed a tap block, and her abdomen was prepped and draped with ChloraPrep in a sterile fashion, an  Ioban was used as well, a Cho catheter was not placed.    The peritoneal cavity was entered in the left upper quadrant using an 11 mm fascial splitting trocar utilizing an Optiview technique and the abdomen was insufflated to a pressure of 15 mmHg with CO2 gas.  Exploratory laparoscopy revealed no evidence of injury from the entrance technique, a relatively normal-appearing liver, weakness at the umbilicus without definite hernia, normal-appearing gallbladder with some fatty infiltration of the wall.    Remaining trocars were placed under direct visualization including 5 mm trochars in the right, mid, and left lateral abdomen and after infiltration of the peritoneum with local anesthetic under direct visualization a 19 mm trocar was placed below and to the right of the umbilicus off the midline.    Through a stab incision in the epigastrium a Stormy retractor was used to elevate the left lobe of the liver.  There was an obvious small to moderate size hiatal hernia and photodocumentation obtained.  Also noted a larger replaced hepatic vessel.  Beginning approximately two thirds of the way around the greater curvature the stomach, the gastrocolic vessels were divided using the Enseal device.  This proceeded proximally taking down all the short gastric vessels and exposing the left micheal.      The hernia sac was incised along the base of the left micheal and extended up and across the phrenoesophageal membrane.  The pars flaccida was divided preserving the replaced hepatic vessel.  The hernia sac was incised along the base of the right micheal and also extended up and across the phrenoesophageal membrane.  The hernia sac and its contents were dissected out of the mediastinum and reduced below the level of the crura.  There was not a paraesophageal component.  A latex free drain was used temporarily for esophageal retraction.  The GE junction was lengthened to well below the level of the crura by dissecting loose areolar  tissue well into the mediastinum.  The crura were dissected to their meeting point inferiorly.  The anterior and posterior vagus nerves were preserved.  The hiatal hernia repair was performed posteriorly using a running nonabsorbable 2-0  V-Loc suture with good result, photodocumentation obtained before and after repair.    1 mg glucagon IV given.  Gastrocolic vessels were then divided medially to a few cm proximal to the pylorus.  Adhesions of the posterior stomach to the pancreas and retroperitoneum were divided.  The stomach was marked with a Kitner saturated with a marking pen 1 cm lateral to the angle of His, 3 cm away from the angularis, and 6 cm from the pylorus.  A 38 [place] balloon bougie was advanced into the distal antrum and the balloon insufflated with 15 cc of saline.    The Titan stapler was positioned along the markings with the calibration balloon at the marking 3 cm from the angularis and the stapler was closed.  The calibration bougie was desufflated and removed.  The 85% subtotal vertical sleeve gastrectomy was then performed with a single firing using the Titan stapler (7FFAC).    The Titan stapler was removed.  The subtotal gastrectomy specimen was retrieved through the 19 mm trocar site incision, inspected, and sent unopened to pathology for permanent section.  It was a slightly smaller than average size specimen.  The sleeve was submerged under saline.  Upper endoscopy was performed, and the endoscope was advanced into the duodenal bulb.  No air bubbles or leak seen, no bleeding at the staple line, no narrowing at the angularis, no pyloric spasm or deformity, no gastritis, no hiatal hernia or Rios's esophagus, Z-line very indistinct but appeared to be roughly at the 38 cm from incisors,, and the endoscope was withdrawn.  Endoscopic photodocumentation obtained of the widely patent pylorus.   Irrigation fluid was suctioned free.  The sleeve was resting nicely and hemostatic.  The sleeve  staple line was treated with 10 cc of aerosolized Tisseel fibrin glue.  Photodocumentation of the sleeve obtained before and after endoscopy.  The Stormy retractor was removed.  10 mg Barhemsys IV given.  Fascia at the 19 mm trocar site incision was closed with a horizontal mattress 0 Vicryl suture placed with a suture passer under direct visualization and tying the knot extracorporeally.  Remaining trocars were removed under direct visualization, no bleeding noted from their sites.  Skin in each incision was closed using 3-0 Monocryl plus in an interrupted subcuticular stitch followed by skin glue.  The patient tolerated the procedure well without complication, was taken to the recovery room in stable condition.

## 2023-05-04 NOTE — PLAN OF CARE
Goal Outcome Evaluation:  Plan of Care Reviewed With: patient           Outcome Evaluation: patient received from PACU after bariatric surgery, complains of abdominal and shoulder pain relieved with IV pain medication, patient ambulated in the rousseau independently with no distress IS 1500 voids spontaneously VSS

## 2023-05-04 NOTE — BRIEF OP NOTE
GASTRIC SLEEVE WITH HIATAL HERNIA LAPAROSCOPIC, ESOPHAGOGASTRODUODENOSCOPY  Progress Note    Beverly Giron  5/4/2023    Pre-op Diagnosis:   Morbid obesity with body mass index of 45.0-49.9 in adult [E66.01, Z68.42]  Hiatal hernia with gastroesophageal reflux [K44.9, K21.9]       Post-Op Diagnosis Codes:     * Morbid obesity with body mass index of 45.0-49.9 in adult [E66.01, Z68.42]     * Hiatal hernia with gastroesophageal reflux [K44.9, K21.9]    Procedure/CPT® Codes:  LA LAPS GSTRC RSTRICTIV PX LONGITUDINAL GASTRECTOMY [35920]  LA LAPS SURG ESOPG/GSTR FUNDOPLASTY [02629]  LA ESOPHAGOGASTRODUODENOSCOPY TRANSORAL DIAGNOSTIC [13067]      Procedure(s):  GASTRIC SLEEVE LAPAROSCOPIC  HIATAL HERNIA REPAIR LAPAROSCOPIC  ESOPHAGOGASTRODUODENOSCOPY        Surgeon(s):  Jovanny Canada MD    Anesthesia: General with Block    Staff:   Circulator: Marge Graham RN; Juan R Anderson RN  Scrub Person: Niharika Grayson  Nursing Assistant: Jacklyn Mcallister CNA         Estimated Blood Loss: minimal    Urine Voided: * No values recorded between 5/4/2023  7:23 AM and 5/4/2023  8:38 AM *    Specimens:                Specimens     ID Source Type Tests Collected By Collected At Frozen?    A Stomach Tissue · TISSUE PATHOLOGY EXAM   Jovanny Canada MD 5/4/23 8166 No    Description: SUB-TOTAL GASTRECTOMY    This specimen was not marked as sent.                Drains: * No LDAs found *    Findings:         Complications: none          Jovanny Canada MD     Date: 5/4/2023  Time: 08:46 EDT

## 2023-05-04 NOTE — ANESTHESIA PREPROCEDURE EVALUATION
Anesthesia Evaluation     Patient summary reviewed and Nursing notes reviewed                Airway   Mallampati: III  TM distance: >3 FB  Neck ROM: full  Large neck circumference and Possible difficult intubation  Dental - normal exam     Pulmonary - normal exam   (+) a smoker (4/22) Former,   Cardiovascular - normal exam    (+) DVT (2/21; eliquis tx; last dose = 4 days ag; lovenox bridge),     ROS comment:   Echo 8/22: normal EF, no significant valve disease     Neuro/Psych- negative ROS  GI/Hepatic/Renal/Endo    (+) morbid obesity, GERD,      Musculoskeletal (-) negative ROS    Abdominal  - normal exam    Bowel sounds: normal.   Substance History - negative use     OB/GYN negative ob/gyn ROS         Other                      Anesthesia Plan    ASA 3     general     (TAP blocks  monzon available )  intravenous induction     Anesthetic plan, risks, benefits, and alternatives have been provided, discussed and informed consent has been obtained with: patient.    Plan discussed with CRNA.        CODE STATUS:

## 2023-05-05 ENCOUNTER — APPOINTMENT (OUTPATIENT)
Dept: GENERAL RADIOLOGY | Facility: HOSPITAL | Age: 38
End: 2023-05-05
Payer: MEDICAID

## 2023-05-05 VITALS
SYSTOLIC BLOOD PRESSURE: 125 MMHG | RESPIRATION RATE: 18 BRPM | WEIGHT: 280.5 LBS | HEART RATE: 72 BPM | OXYGEN SATURATION: 100 % | DIASTOLIC BLOOD PRESSURE: 89 MMHG | TEMPERATURE: 98.6 F | BODY MASS INDEX: 46.73 KG/M2 | HEIGHT: 65 IN

## 2023-05-05 LAB
ALBUMIN SERPL-MCNC: 3.9 G/DL (ref 3.5–5.2)
ALBUMIN/GLOB SERPL: 1.5 G/DL
ALP SERPL-CCNC: 47 U/L (ref 39–117)
ALT SERPL W P-5'-P-CCNC: 9 U/L (ref 1–33)
ANION GAP SERPL CALCULATED.3IONS-SCNC: 10 MMOL/L (ref 5–15)
AST SERPL-CCNC: 16 U/L (ref 1–32)
BASOPHILS # BLD AUTO: 0.02 10*3/MM3 (ref 0–0.2)
BASOPHILS NFR BLD AUTO: 0.2 % (ref 0–1.5)
BILIRUB SERPL-MCNC: 0.3 MG/DL (ref 0–1.2)
BUN SERPL-MCNC: 7 MG/DL (ref 6–20)
BUN/CREAT SERPL: 7.8 (ref 7–25)
CALCIUM SPEC-SCNC: 8.6 MG/DL (ref 8.6–10.5)
CHLORIDE SERPL-SCNC: 107 MMOL/L (ref 98–107)
CO2 SERPL-SCNC: 23 MMOL/L (ref 22–29)
CREAT SERPL-MCNC: 0.9 MG/DL (ref 0.57–1)
CYTO UR: NORMAL
DEPRECATED RDW RBC AUTO: 44.5 FL (ref 37–54)
EGFRCR SERPLBLD CKD-EPI 2021: 85.1 ML/MIN/1.73
EOSINOPHIL # BLD AUTO: 0.02 10*3/MM3 (ref 0–0.4)
EOSINOPHIL NFR BLD AUTO: 0.2 % (ref 0.3–6.2)
ERYTHROCYTE [DISTWIDTH] IN BLOOD BY AUTOMATED COUNT: 13.5 % (ref 12.3–15.4)
GLOBULIN UR ELPH-MCNC: 2.6 GM/DL
GLUCOSE SERPL-MCNC: 80 MG/DL (ref 65–99)
HCT VFR BLD AUTO: 40.7 % (ref 34–46.6)
HGB BLD-MCNC: 13.2 G/DL (ref 12–15.9)
IMM GRANULOCYTES # BLD AUTO: 0.04 10*3/MM3 (ref 0–0.05)
IMM GRANULOCYTES NFR BLD AUTO: 0.4 % (ref 0–0.5)
IRON 24H UR-MRATE: 53 MCG/DL (ref 37–145)
LAB AP CASE REPORT: NORMAL
LAB AP CLINICAL INFORMATION: NORMAL
LYMPHOCYTES # BLD AUTO: 2.31 10*3/MM3 (ref 0.7–3.1)
LYMPHOCYTES NFR BLD AUTO: 21.4 % (ref 19.6–45.3)
MCH RBC QN AUTO: 29.1 PG (ref 26.6–33)
MCHC RBC AUTO-ENTMCNC: 32.4 G/DL (ref 31.5–35.7)
MCV RBC AUTO: 89.6 FL (ref 79–97)
MONOCYTES # BLD AUTO: 0.98 10*3/MM3 (ref 0.1–0.9)
MONOCYTES NFR BLD AUTO: 9.1 % (ref 5–12)
NEUTROPHILS NFR BLD AUTO: 68.7 % (ref 42.7–76)
NEUTROPHILS NFR BLD AUTO: 7.42 10*3/MM3 (ref 1.7–7)
NRBC BLD AUTO-RTO: 0 /100 WBC (ref 0–0.2)
PATH REPORT.FINAL DX SPEC: NORMAL
PATH REPORT.GROSS SPEC: NORMAL
PLATELET # BLD AUTO: 316 10*3/MM3 (ref 140–450)
PMV BLD AUTO: 9.6 FL (ref 6–12)
POTASSIUM SERPL-SCNC: 4.1 MMOL/L (ref 3.5–5.2)
PROT SERPL-MCNC: 6.5 G/DL (ref 6–8.5)
RBC # BLD AUTO: 4.54 10*6/MM3 (ref 3.77–5.28)
SODIUM SERPL-SCNC: 140 MMOL/L (ref 136–145)
WBC NRBC COR # BLD: 10.79 10*3/MM3 (ref 3.4–10.8)

## 2023-05-05 PROCEDURE — 0 POTASSIUM CHLORIDE PER 2 MEQ: Performed by: SURGERY

## 2023-05-05 PROCEDURE — 25010000002 ENOXAPARIN PER 10 MG: Performed by: SURGERY

## 2023-05-05 PROCEDURE — 74240 X-RAY XM UPR GI TRC 1CNTRST: CPT

## 2023-05-05 PROCEDURE — 99024 POSTOP FOLLOW-UP VISIT: CPT | Performed by: SURGERY

## 2023-05-05 PROCEDURE — 25010000002 ONDANSETRON PER 1 MG: Performed by: SURGERY

## 2023-05-05 PROCEDURE — 80053 COMPREHEN METABOLIC PANEL: CPT | Performed by: SURGERY

## 2023-05-05 PROCEDURE — 83540 ASSAY OF IRON: CPT | Performed by: SURGERY

## 2023-05-05 PROCEDURE — 0 DIATRIZOATE MEGLUMINE & SODIUM PER 1 ML: Performed by: SURGERY

## 2023-05-05 PROCEDURE — 85025 COMPLETE CBC W/AUTO DIFF WBC: CPT | Performed by: SURGERY

## 2023-05-05 PROCEDURE — 25010000002 CYANOCOBALAMIN PER 1000 MCG: Performed by: SURGERY

## 2023-05-05 PROCEDURE — 63710000001 PROMETHAZINE PER 12.5 MG: Performed by: SURGERY

## 2023-05-05 PROCEDURE — 25010000002 THIAMINE PER 100 MG: Performed by: SURGERY

## 2023-05-05 RX ORDER — OMEPRAZOLE 40 MG/1
40 CAPSULE, DELAYED RELEASE ORAL DAILY
Qty: 60 CAPSULE | Refills: 0 | Status: SHIPPED | OUTPATIENT
Start: 2023-05-05 | End: 2023-07-04

## 2023-05-05 RX ORDER — OXYCODONE HYDROCHLORIDE 5 MG/1
5 TABLET ORAL EVERY 4 HOURS PRN
Qty: 12 TABLET | Refills: 0 | Status: ON HOLD | OUTPATIENT
Start: 2023-05-05 | End: 2023-05-09 | Stop reason: SDUPTHER

## 2023-05-05 RX ORDER — ONDANSETRON 4 MG/1
4 TABLET, FILM COATED ORAL EVERY 4 HOURS PRN
Qty: 10 TABLET | Refills: 0 | Status: SHIPPED | OUTPATIENT
Start: 2023-05-05

## 2023-05-05 RX ADMIN — SIMETHICONE 80 MG: 80 TABLET, CHEWABLE ORAL at 08:13

## 2023-05-05 RX ADMIN — ACETAMINOPHEN 1000 MG: 500 TABLET ORAL at 15:09

## 2023-05-05 RX ADMIN — OXYCODONE HYDROCHLORIDE 5 MG: 5 TABLET ORAL at 06:50

## 2023-05-05 RX ADMIN — THIAMINE HYDROCHLORIDE 100 ML/HR: 100 INJECTION, SOLUTION INTRAMUSCULAR; INTRAVENOUS at 02:42

## 2023-05-05 RX ADMIN — POTASSIUM CHLORIDE AND SODIUM CHLORIDE 125 ML/HR: 450; 150 INJECTION, SOLUTION INTRAVENOUS at 08:12

## 2023-05-05 RX ADMIN — OXYCODONE HYDROCHLORIDE 5 MG: 5 TABLET ORAL at 18:05

## 2023-05-05 RX ADMIN — CYANOCOBALAMIN 1000 MCG: 1000 INJECTION, SOLUTION INTRAMUSCULAR; SUBCUTANEOUS at 08:13

## 2023-05-05 RX ADMIN — GABAPENTIN 100 MG: 100 CAPSULE ORAL at 08:25

## 2023-05-05 RX ADMIN — ACETAMINOPHEN 1000 MG: 500 TABLET ORAL at 08:24

## 2023-05-05 RX ADMIN — PROMETHAZINE HYDROCHLORIDE 12.5 MG: 12.5 TABLET ORAL at 18:05

## 2023-05-05 RX ADMIN — GABAPENTIN 100 MG: 100 CAPSULE ORAL at 15:09

## 2023-05-05 RX ADMIN — ONDANSETRON 4 MG: 2 INJECTION INTRAMUSCULAR; INTRAVENOUS at 08:13

## 2023-05-05 RX ADMIN — ENOXAPARIN SODIUM 40 MG: 100 INJECTION SUBCUTANEOUS at 08:15

## 2023-05-05 RX ADMIN — PANTOPRAZOLE SODIUM 40 MG: 40 INJECTION, POWDER, LYOPHILIZED, FOR SOLUTION INTRAVENOUS at 05:23

## 2023-05-05 NOTE — PLAN OF CARE
Goal Outcome Evaluation:              Outcome Evaluation: VSS.  On room air.  A&O x4.  Fluids infusing.  Antibiotics infused.  Patient did not walk the unit this shift.  Patient complains of 4/10 pain and states it is tolerable.  Patient has been getting up to the bathroom.  Will continue to follow plan of care.

## 2023-05-05 NOTE — CASE MANAGEMENT/SOCIAL WORK
Discharge Planning Assessment  [facility]     Patient Name: Beverly Giron  MRN: [id number]  Today's Date: 5/5/2023    Admit Date: 5/4/2023    Plan:  eval   Discharge Needs Assessment     Row Name 05/05/23 1441       Living Environment    People in Home spouse;child(arlette), dependent    Current Living Arrangements home    Primary Care Provided by self    Provides Primary Care For no one    Family Caregiver if Needed spouse    Quality of Family Relationships involved;helpful       Transition Planning    Patient/Family Anticipates Transition to home with family    Patient/Family Anticipated Services at Transition     Transportation Anticipated family or friend will provide       Discharge Needs Assessment    Readmission Within the Last 30 Days no previous admission in last 30 days    Equipment Currently Used at Home none    Concerns to be Addressed discharge planning    Anticipated Changes Related to Illness none    Equipment Needed After Discharge none               Discharge Plan     Row Name 05/05/23 1442       Plan    Plan CM eval    Plan Comments Confirmed patient lives with her spouse and children in [facility] [place]. She is independent of ADLs and denies the use of DME. No dc needs identified at this time- CM will continue to follow- acacia [phone]    Final Discharge Disposition Code 01 - home or self-care              Continued Care and Services - Admitted Since 5/4/2023    Coordination has not been started for this encounter.          Demographic Summary     Row Name 05/05/23 1441       General Information    Admission Type inpatient    Arrived From home    Referral Source admission list    Reason for Consult discharge planning    Preferred Language English       Contact Information    Permission Granted to Share Info With                Functional Status     Row Name 05/05/23 1441       Functional Status    Usual Activity Tolerance good    Current Activity Tolerance moderate        Functional Status, IADL    Medications independent    Meal Preparation independent    Housekeeping independent    Laundry independent    Shopping independent               Psychosocial    No documentation.                Abuse/Neglect    No documentation.                Legal    No documentation.                Substance Abuse    No documentation.                Patient Forms    No documentation.                   Ange Tirado RN

## 2023-05-05 NOTE — ADDENDUM NOTE
Addendum  created 05/05/23 1505 by Schirmer, Shelley P, CRNA    Clinical Note Signed, Diagnosis association updated, Intraprocedure Blocks edited

## 2023-05-05 NOTE — PLAN OF CARE
Goal Outcome Evaluation:  Plan of Care Reviewed With: patient        Progress: improving  Outcome Evaluation: patient aolrt and oriented painand nausea controlled with POpainmedications ambulatingindependently with no distress, eager for DC tolerating prrotein, family at bedside

## 2023-05-05 NOTE — PROGRESS NOTES
"Cc: POD#1 LSG/HHR  \"sore\"    Her mother and another gentleman are in the room.  She is sitting up at the side of the bed and says she is very sore but it is controlled with oral medication and she would like to go home.  She says she is ambulating and voiding well.  Not much nausea.  Tolerated protein shake.  Small amount of flatus, no bowel movement.  No pulmonary complaints, spirometer 2000 observed.  She is scheduled to have her veins injected tomorrow and I told her as long as there is no steroids mixed in the injection and they do not give her any anti-inflammatories then it would be okay otherwise she would need to wait around 8 weeks.    No fever or tachycardia pulse 72 respirations 18 blood pressure 125/89 room air sat 100% she is in no apparent distress.  Abdomen soft, nontender/appropriate, nondistended, bowel sounds present.  Wounds look okay slight ecchymosis around her periumbilical incision without cellulitis    CMP normal iron normal CBC unremarkable of note white count 10.8 with a normal differential hemoglobin 13.2 hemoglobin A1c normal 5.20 upper GI images and report reviewed, no leak or obstruction they noted moderate luminal narrowing of the proximal portion of the sleeve which likely represents postoperative edema no delay in gastric emptying.    Impression: Postoperative #1 laparoscopic sleeve gastrectomy and hiatal hernia repair.  Doing okay clinically.  She would like to go home and does meet discharge criteria.  Abnormal upper GI report as above, tolerating her diet with minimal nausea, intraoperative EGD no significant narrow area in the proximal sleeve.    Plan: Discharge home.  Discharge instructions discussed.  Resume anticoagulation.  Follow-up in the office in 1 to 2 weeks and call in the meantime with any problems questions or concerns.  Reiterated avoiding ulcerogenic agents for the next 8 weeks and voiced understanding.  See orders.      "

## 2023-05-06 ENCOUNTER — APPOINTMENT (OUTPATIENT)
Dept: CT IMAGING | Facility: HOSPITAL | Age: 38
End: 2023-05-06
Payer: MEDICAID

## 2023-05-06 ENCOUNTER — HOSPITAL ENCOUNTER (OUTPATIENT)
Facility: HOSPITAL | Age: 38
Discharge: HOME | End: 2023-05-09
Attending: EMERGENCY MEDICINE
Payer: MEDICAID

## 2023-05-06 DIAGNOSIS — K44.0 HIATAL HERNIA WITH OBSTRUCTION BUT NO GANGRENE: Primary | ICD-10-CM

## 2023-05-06 DIAGNOSIS — R10.10 UPPER ABDOMINAL PAIN: ICD-10-CM

## 2023-05-06 DIAGNOSIS — R11.2 NAUSEA AND VOMITING IN ADULT: ICD-10-CM

## 2023-05-06 DIAGNOSIS — E66.01 MORBID OBESITY WITH BODY MASS INDEX OF 45.0-49.9 IN ADULT: ICD-10-CM

## 2023-05-06 LAB
ALBUMIN SERPL-MCNC: 4.2 G/DL (ref 3.5–5.2)
ALBUMIN/GLOB SERPL: 1.4 G/DL
ALP SERPL-CCNC: 46 U/L (ref 39–117)
ALT SERPL W P-5'-P-CCNC: 9 U/L (ref 1–33)
ANION GAP SERPL CALCULATED.3IONS-SCNC: 10 MMOL/L (ref 5–15)
AST SERPL-CCNC: 17 U/L (ref 1–32)
B PARAPERT DNA SPEC QL NAA+PROBE: NOT DETECTED
B PERT DNA SPEC QL NAA+PROBE: NOT DETECTED
BASOPHILS # BLD AUTO: 0.05 10*3/MM3 (ref 0–0.2)
BASOPHILS NFR BLD AUTO: 0.5 % (ref 0–1.5)
BILIRUB SERPL-MCNC: 0.3 MG/DL (ref 0–1.2)
BUN SERPL-MCNC: 8 MG/DL (ref 6–20)
BUN/CREAT SERPL: 8.6 (ref 7–25)
C PNEUM DNA NPH QL NAA+NON-PROBE: NOT DETECTED
CALCIUM SPEC-SCNC: 8.5 MG/DL (ref 8.6–10.5)
CHLORIDE SERPL-SCNC: 107 MMOL/L (ref 98–107)
CO2 SERPL-SCNC: 24 MMOL/L (ref 22–29)
CREAT SERPL-MCNC: 0.93 MG/DL (ref 0.57–1)
D-LACTATE SERPL-SCNC: 0.7 MMOL/L (ref 0.5–2)
DEPRECATED RDW RBC AUTO: 46.5 FL (ref 37–54)
EGFRCR SERPLBLD CKD-EPI 2021: 81.9 ML/MIN/1.73
EOSINOPHIL # BLD AUTO: 0.21 10*3/MM3 (ref 0–0.4)
EOSINOPHIL NFR BLD AUTO: 1.9 % (ref 0.3–6.2)
ERYTHROCYTE [DISTWIDTH] IN BLOOD BY AUTOMATED COUNT: 13.8 % (ref 12.3–15.4)
FLUAV SUBTYP SPEC NAA+PROBE: NOT DETECTED
FLUBV RNA ISLT QL NAA+PROBE: NOT DETECTED
GLOBULIN UR ELPH-MCNC: 3.1 GM/DL
GLUCOSE SERPL-MCNC: 81 MG/DL (ref 65–99)
HADV DNA SPEC NAA+PROBE: NOT DETECTED
HCOV 229E RNA SPEC QL NAA+PROBE: NOT DETECTED
HCOV HKU1 RNA SPEC QL NAA+PROBE: NOT DETECTED
HCOV NL63 RNA SPEC QL NAA+PROBE: NOT DETECTED
HCOV OC43 RNA SPEC QL NAA+PROBE: NOT DETECTED
HCT VFR BLD AUTO: 42.4 % (ref 34–46.6)
HGB BLD-MCNC: 13.7 G/DL (ref 12–15.9)
HMPV RNA NPH QL NAA+NON-PROBE: NOT DETECTED
HPIV1 RNA ISLT QL NAA+PROBE: NOT DETECTED
HPIV2 RNA SPEC QL NAA+PROBE: NOT DETECTED
HPIV3 RNA NPH QL NAA+PROBE: NOT DETECTED
HPIV4 P GENE NPH QL NAA+PROBE: NOT DETECTED
IMM GRANULOCYTES # BLD AUTO: 0.02 10*3/MM3 (ref 0–0.05)
IMM GRANULOCYTES NFR BLD AUTO: 0.2 % (ref 0–0.5)
LIPASE SERPL-CCNC: 45 U/L (ref 13–60)
LYMPHOCYTES # BLD AUTO: 3.39 10*3/MM3 (ref 0.7–3.1)
LYMPHOCYTES NFR BLD AUTO: 30.9 % (ref 19.6–45.3)
M PNEUMO IGG SER IA-ACNC: NOT DETECTED
MCH RBC QN AUTO: 29.4 PG (ref 26.6–33)
MCHC RBC AUTO-ENTMCNC: 32.3 G/DL (ref 31.5–35.7)
MCV RBC AUTO: 91 FL (ref 79–97)
MONOCYTES # BLD AUTO: 0.8 10*3/MM3 (ref 0.1–0.9)
MONOCYTES NFR BLD AUTO: 7.3 % (ref 5–12)
NEUTROPHILS NFR BLD AUTO: 59.2 % (ref 42.7–76)
NEUTROPHILS NFR BLD AUTO: 6.49 10*3/MM3 (ref 1.7–7)
NRBC BLD AUTO-RTO: 0 /100 WBC (ref 0–0.2)
PLATELET # BLD AUTO: 305 10*3/MM3 (ref 140–450)
PMV BLD AUTO: 9.3 FL (ref 6–12)
POTASSIUM SERPL-SCNC: 3.7 MMOL/L (ref 3.5–5.2)
PROT SERPL-MCNC: 7.3 G/DL (ref 6–8.5)
RBC # BLD AUTO: 4.66 10*6/MM3 (ref 3.77–5.28)
RHINOVIRUS RNA SPEC NAA+PROBE: NOT DETECTED
RSV RNA NPH QL NAA+NON-PROBE: NOT DETECTED
SARS-COV-2 RNA NPH QL NAA+NON-PROBE: NOT DETECTED
SODIUM SERPL-SCNC: 141 MMOL/L (ref 136–145)
WBC NRBC COR # BLD: 10.96 10*3/MM3 (ref 3.4–10.8)

## 2023-05-06 PROCEDURE — 96374 THER/PROPH/DIAG INJ IV PUSH: CPT

## 2023-05-06 PROCEDURE — 93005 ELECTROCARDIOGRAM TRACING: CPT | Performed by: EMERGENCY MEDICINE

## 2023-05-06 PROCEDURE — 85025 COMPLETE CBC W/AUTO DIFF WBC: CPT | Performed by: EMERGENCY MEDICINE

## 2023-05-06 PROCEDURE — 25010000002 ONDANSETRON PER 1 MG: Performed by: EMERGENCY MEDICINE

## 2023-05-06 PROCEDURE — 96376 TX/PRO/DX INJ SAME DRUG ADON: CPT

## 2023-05-06 PROCEDURE — 99284 EMERGENCY DEPT VISIT MOD MDM: CPT

## 2023-05-06 PROCEDURE — G0378 HOSPITAL OBSERVATION PER HR: HCPCS

## 2023-05-06 PROCEDURE — 0202U NFCT DS 22 TRGT SARS-COV-2: CPT | Performed by: EMERGENCY MEDICINE

## 2023-05-06 PROCEDURE — 80053 COMPREHEN METABOLIC PANEL: CPT | Performed by: EMERGENCY MEDICINE

## 2023-05-06 PROCEDURE — 25010000002 HYDROMORPHONE PER 4 MG: Performed by: SURGERY

## 2023-05-06 PROCEDURE — 96361 HYDRATE IV INFUSION ADD-ON: CPT

## 2023-05-06 PROCEDURE — 96375 TX/PRO/DX INJ NEW DRUG ADDON: CPT

## 2023-05-06 PROCEDURE — 36415 COLL VENOUS BLD VENIPUNCTURE: CPT

## 2023-05-06 PROCEDURE — 74176 CT ABD & PELVIS W/O CONTRAST: CPT

## 2023-05-06 PROCEDURE — 83605 ASSAY OF LACTIC ACID: CPT | Performed by: EMERGENCY MEDICINE

## 2023-05-06 PROCEDURE — 83690 ASSAY OF LIPASE: CPT | Performed by: EMERGENCY MEDICINE

## 2023-05-06 RX ORDER — TOPIRAMATE 25 MG/1
25 TABLET ORAL DAILY
Status: DISCONTINUED | OUTPATIENT
Start: 2023-05-07 | End: 2023-05-08

## 2023-05-06 RX ORDER — CETIRIZINE HYDROCHLORIDE 10 MG/1
10 TABLET ORAL DAILY
Status: DISCONTINUED | OUTPATIENT
Start: 2023-05-07 | End: 2023-05-09 | Stop reason: HOSPADM

## 2023-05-06 RX ORDER — CYCLOBENZAPRINE HCL 10 MG
10 TABLET ORAL 2 TIMES DAILY PRN
Status: DISCONTINUED | OUTPATIENT
Start: 2023-05-06 | End: 2023-05-09 | Stop reason: HOSPADM

## 2023-05-06 RX ORDER — PROCHLORPERAZINE EDISYLATE 5 MG/ML
5 INJECTION INTRAMUSCULAR; INTRAVENOUS EVERY 6 HOURS PRN
Status: DISCONTINUED | OUTPATIENT
Start: 2023-05-06 | End: 2023-05-08

## 2023-05-06 RX ORDER — PANTOPRAZOLE SODIUM 40 MG/10ML
40 INJECTION, POWDER, LYOPHILIZED, FOR SOLUTION INTRAVENOUS
Status: DISCONTINUED | OUTPATIENT
Start: 2023-05-07 | End: 2023-05-08

## 2023-05-06 RX ORDER — ONDANSETRON 4 MG/1
4 TABLET, FILM COATED ORAL EVERY 6 HOURS PRN
Status: DISCONTINUED | OUTPATIENT
Start: 2023-05-06 | End: 2023-05-08

## 2023-05-06 RX ORDER — ONDANSETRON 2 MG/ML
4 INJECTION INTRAMUSCULAR; INTRAVENOUS ONCE
Status: COMPLETED | OUTPATIENT
Start: 2023-05-06 | End: 2023-05-06

## 2023-05-06 RX ORDER — PROCHLORPERAZINE 25 MG
25 SUPPOSITORY, RECTAL RECTAL EVERY 12 HOURS PRN
Status: DISCONTINUED | OUTPATIENT
Start: 2023-05-06 | End: 2023-05-08

## 2023-05-06 RX ORDER — OXYCODONE HYDROCHLORIDE 5 MG/1
5 TABLET ORAL EVERY 4 HOURS PRN
Status: DISCONTINUED | OUTPATIENT
Start: 2023-05-06 | End: 2023-05-08

## 2023-05-06 RX ORDER — PROCHLORPERAZINE MALEATE 5 MG/1
5 TABLET ORAL EVERY 6 HOURS PRN
Status: DISCONTINUED | OUTPATIENT
Start: 2023-05-06 | End: 2023-05-08

## 2023-05-06 RX ORDER — SODIUM CHLORIDE 0.9 % (FLUSH) 0.9 %
10 SYRINGE (ML) INJECTION AS NEEDED
Status: DISCONTINUED | OUTPATIENT
Start: 2023-05-06 | End: 2023-05-06

## 2023-05-06 RX ORDER — HYDROMORPHONE HYDROCHLORIDE 1 MG/ML
0.5 INJECTION, SOLUTION INTRAMUSCULAR; INTRAVENOUS; SUBCUTANEOUS
Status: DISCONTINUED | OUTPATIENT
Start: 2023-05-06 | End: 2023-05-08

## 2023-05-06 RX ORDER — ESCITALOPRAM OXALATE 10 MG/1
10 TABLET ORAL DAILY
Status: DISCONTINUED | OUTPATIENT
Start: 2023-05-07 | End: 2023-05-09 | Stop reason: HOSPADM

## 2023-05-06 RX ADMIN — ONDANSETRON 4 MG: 2 INJECTION INTRAMUSCULAR; INTRAVENOUS at 19:50

## 2023-05-06 RX ADMIN — HYDROMORPHONE HYDROCHLORIDE 0.5 MG: 1 INJECTION, SOLUTION INTRAMUSCULAR; INTRAVENOUS; SUBCUTANEOUS at 23:33

## 2023-05-06 RX ADMIN — SODIUM CHLORIDE 1000 ML: 9 INJECTION, SOLUTION INTRAVENOUS at 19:56

## 2023-05-06 RX ADMIN — ONDANSETRON 4 MG: 2 INJECTION INTRAMUSCULAR; INTRAVENOUS at 22:35

## 2023-05-06 RX ADMIN — ALUMINUM HYDROXIDE, MAGNESIUM HYDROXIDE, AND DIMETHICONE: 400; 400; 40 SUSPENSION ORAL at 19:50

## 2023-05-06 RX ADMIN — SODIUM CHLORIDE 2000 ML: 9 INJECTION, SOLUTION INTRAVENOUS at 19:51

## 2023-05-06 NOTE — ED PROVIDER NOTES
Subjective   History of Present Illness  38-year-old female who recently had gastric sleeve performed by Dr. Shaw who presents with a complaint of nausea vomiting and decreased oral intake.  The patient reports that the sleeve was performed on Thursday.  She was actually released from the hospital yesterday, on Friday.  She does report that she was still spitting up/having some nausea at that given time prior to going home.  She reports that since being home she has continued to have nausea vomiting and spitting up with any attempted oral intake.  She has been taking her medication without relief of symptoms.  She does report some generalized abdominal pain that is unchanged secondary to the recent surgery.  This has not increased in nature.  She denies chest pain cough or shortness of breath.  She does report a burning sensation at the center of her chest that she feels is associated with the vomiting.  She reports that the pain is exacerbated with swallowing.  No fever.  No change in bowel or urinary function.  No other acute complaints.        Review of Systems   Constitutional: Negative for chills, fatigue and fever.   HENT: Negative for congestion, ear pain, postnasal drip, sinus pressure and sore throat.    Eyes: Negative for pain, redness and visual disturbance.   Respiratory: Negative for cough, chest tightness and shortness of breath.    Cardiovascular: Positive for chest pain. Negative for palpitations and leg swelling.   Gastrointestinal: Positive for abdominal pain, nausea and vomiting. Negative for anal bleeding, blood in stool and diarrhea.   Endocrine: Negative for polydipsia and polyuria.   Genitourinary: Negative for difficulty urinating, dysuria, frequency and urgency.   Musculoskeletal: Negative for arthralgias, back pain and neck pain.   Skin: Negative for pallor and rash.   Allergic/Immunologic: Negative for environmental allergies and immunocompromised state.   Neurological: Negative for  dizziness, weakness and headaches.   Hematological: Negative for adenopathy.   Psychiatric/Behavioral: Negative for confusion, self-injury and suicidal ideas. The patient is not nervous/anxious.    All other systems reviewed and are negative.      Past Medical History:   Diagnosis Date   • Anxiety and depression    • Carpal tunnel syndrome    • Elevated LDL cholesterol level    • Former smoker    • Frequent headaches    • Genital herpes    • GERD (gastroesophageal reflux disease)    • Heart murmur    • Heartburn     Tums PRN; EGD Dr. Canada    • Hiatal hernia with gastroesophageal reflux     EGD Dr. Canada    • History of DVT of lower extremity     following partial hysterectomy- believed to be provoked. Following with heme/onc   • History of partial hysterectomy    • HPV (human papilloma virus) infection    • Hx of eye surgery    • Hx of ovarian cyst    • Low back pain     no steroids. PRN NSAIDs   • Migraines     PRN flexeril and nsaids   • Mitral regurgitation     Mild   •  (normal spontaneous vaginal delivery)     x 3, 1st two and 4th deliveries.  No complics   • Varicose veins of both lower extremities    • Venous insufficiency        No Known Allergies    Past Surgical History:   Procedure Laterality Date   •  SECTION N/A     x 3. 3rd and last 2 deliveries   • ECHO - CONVERTED  2022    EF 60%. Trace -Mild MR. LA- 4.2   • ENDOSCOPY N/A 2023    Procedure: ESOPHAGOGASTRODUODENOSCOPY;  Surgeon: Jovanny Canada MD;  Location: [facility] OR;  Service: Bariatric;  Laterality: N/A;  scope    • EYE SURGERY Bilateral    • GASTRECTOMY N/A 2023    Procedure: LAPAROSCOPIC GASTRECTOMY, HIATAL HERNIA REPAIR LAPAROSCOPIC;  Surgeon: Jovanny Canada MD;  Location:  [facility] OR;  Service: Bariatric;  Laterality: N/A;  hiatal hernia time: 1111-6302   • LAPAROSCOPIC TUBAL LIGATION     • SUBTOTAL HYSTERECTOMY      laparoscopic; still has ovaries, complicated by left DVT   • US  VENOUS EXTREMITY UNILATERAL  2022    Normal, no DVT   • US VENOUS EXTREMITY UNILATERAL  02/10/2022    Partially occlusive superficial thrombophlebitis greater saphenous vein   • US VENOUS EXTREMITY UNILATERAL  2021    Small amount residual thrombus greater saphenous vein   • US VENOUS EXTREMITY UNILATERAL  2021    Superficial thrombosis greater saphenous vein   • US VENOUS EXTREMITY UNILATERAL  03/10/2021    Occlusive extensive thrombus within the greater saphenous vein from upper calf throughout the thigh   • VEIN LIGATION AND STRIPPING Bilateral        Family History   Problem Relation Age of Onset   • Hypertension Mother    • COPD Mother    • Sleep apnea Mother    • No Known Problems Father    • Scoliosis Sister    • No Known Problems Sister    • No Known Problems Sister    • Heart attack Maternal Grandmother    • Heart disease Maternal Grandmother    • Cancer Maternal Grandmother    • Liver disease Maternal Grandfather    • Seizures Maternal Grandfather    • Stroke Maternal Grandfather    • No Known Problems Paternal Grandmother    • No Known Problems Daughter    • No Known Problems Daughter    • No Known Problems Daughter    • No Known Problems Son    • No Known Problems Son    • Heart attack Maternal Aunt 60   • Hypertension Maternal Aunt    • Hypertension Maternal Aunt    • Heart attack Maternal Uncle 59       Social History     Socioeconomic History   • Marital status:    Tobacco Use   • Smoking status: Former     Packs/day: 1.00     Years: 10.00     Pack years: 10.00     Types: Cigarettes     Quit date: 2022     Years since quittin.0   • Smokeless tobacco: Never   • Tobacco comments:     she had stopped in  for a few years then restarted 10/2021. Has stopped again 2022.   Vaping Use   • Vaping Use: Never used   Substance and Sexual Activity   • Alcohol use: Yes     Comment: occasionally   • Drug use: Never   • Sexual activity: Defer           Objective   Physical  Exam  Vitals and nursing note reviewed.   Constitutional:       General: She is not in acute distress.     Appearance: Normal appearance. She is well-developed. She is ill-appearing. She is not toxic-appearing or diaphoretic.   HENT:      Head: Normocephalic and atraumatic.      Right Ear: External ear normal.      Left Ear: External ear normal.      Nose: Nose normal.   Eyes:      General: Lids are normal.      Pupils: Pupils are equal, round, and reactive to light.   Neck:      Trachea: No tracheal deviation.   Cardiovascular:      Rate and Rhythm: Normal rate and regular rhythm.      Pulses: No decreased pulses.      Heart sounds: Normal heart sounds. No murmur heard.    No friction rub. No gallop.   Pulmonary:      Effort: Pulmonary effort is normal. No respiratory distress.      Breath sounds: Normal breath sounds. No decreased breath sounds, wheezing, rhonchi or rales.   Abdominal:      General: Bowel sounds are normal.      Palpations: Abdomen is soft.      Tenderness: There is generalized abdominal tenderness. There is no guarding or rebound.   Musculoskeletal:         General: No deformity. Normal range of motion.      Cervical back: Normal range of motion and neck supple.   Lymphadenopathy:      Cervical: No cervical adenopathy.   Skin:     General: Skin is warm and dry.      Findings: No rash.   Neurological:      Mental Status: She is alert and oriented to person, place, and time.      Cranial Nerves: No cranial nerve deficit.      Sensory: No sensory deficit.   Psychiatric:         Speech: Speech normal.         Behavior: Behavior normal.         Thought Content: Thought content normal.         Judgment: Judgment normal.         Procedures           ED Course                                           Medical Decision Making  Differential diagnosis includes bowel obstruction, gastritis, dehydration, viral illness, renal insufficiency, postoperative complications.    The patient has persistent nausea  vomiting.  No significant lab abnormalities including normal white count, normal kidney function, normal electrolytes, and normal lactic acid level.    CT scan of the abdomen pelvis reports possible gastroesophageal junction spasm, but no definitive abnormality.    Lactic acid level as stated is normal.  Viral respiratory panel was negative.    Despite the administration of medications in the form of Zofran, GI cocktail, and IV fluids the patient continues to feel poorly and continues to have nausea with vomiting or spitting up.    Discussed the patient with the PA for Jeni Lieberman.  Dr. Muniz will be informed and will admit the patient for further care.    Nausea and vomiting in adult: acute illness or injury  Upper abdominal pain: acute illness or injury  Amount and/or Complexity of Data Reviewed  Independent Historian: spouse  External Data Reviewed: labs, radiology and notes.  Labs: ordered. Decision-making details documented in ED Course.  Radiology: ordered and independent interpretation performed. Decision-making details documented in ED Course.  ECG/medicine tests: ordered and independent interpretation performed. Decision-making details documented in ED Course.      Risk  Prescription drug management.  Decision regarding hospitalization.          Final diagnoses:   Nausea and vomiting in adult   Upper abdominal pain       ED Disposition  ED Disposition     ED Disposition   Decision to Admit    Condition   --    Comment   Level of Care: Med/Surg [1]   Admitting Physician: JAIME MUNIZ [[id number]]               No follow-up provider specified.       Medication List      No changes were made to your prescriptions during this visit.          Raz Nunes MD  05/06/23 [id number]

## 2023-05-07 PROBLEM — R11.2 NAUSEA AND VOMITING IN ADULT: Status: ACTIVE | Noted: 2023-05-07

## 2023-05-07 LAB
ALBUMIN SERPL-MCNC: 3.5 G/DL (ref 3.5–5.2)
ALBUMIN/GLOB SERPL: 1.3 G/DL
ALP SERPL-CCNC: 38 U/L (ref 39–117)
ALT SERPL W P-5'-P-CCNC: 11 U/L (ref 1–33)
ANION GAP SERPL CALCULATED.3IONS-SCNC: 10 MMOL/L (ref 5–15)
AST SERPL-CCNC: 17 U/L (ref 1–32)
BASOPHILS # BLD AUTO: 0.03 10*3/MM3 (ref 0–0.2)
BASOPHILS NFR BLD AUTO: 0.3 % (ref 0–1.5)
BILIRUB SERPL-MCNC: 0.3 MG/DL (ref 0–1.2)
BUN SERPL-MCNC: 9 MG/DL (ref 6–20)
BUN/CREAT SERPL: 12.3 (ref 7–25)
CALCIUM SPEC-SCNC: 7.9 MG/DL (ref 8.6–10.5)
CHLORIDE SERPL-SCNC: 114 MMOL/L (ref 98–107)
CO2 SERPL-SCNC: 20 MMOL/L (ref 22–29)
CREAT SERPL-MCNC: 0.73 MG/DL (ref 0.57–1)
DEPRECATED RDW RBC AUTO: 45.8 FL (ref 37–54)
EGFRCR SERPLBLD CKD-EPI 2021: 109.5 ML/MIN/1.73
EOSINOPHIL # BLD AUTO: 0.23 10*3/MM3 (ref 0–0.4)
EOSINOPHIL NFR BLD AUTO: 2.6 % (ref 0.3–6.2)
ERYTHROCYTE [DISTWIDTH] IN BLOOD BY AUTOMATED COUNT: 13.9 % (ref 12.3–15.4)
GLOBULIN UR ELPH-MCNC: 2.6 GM/DL
GLUCOSE SERPL-MCNC: 82 MG/DL (ref 65–99)
HCT VFR BLD AUTO: 38.8 % (ref 34–46.6)
HGB BLD-MCNC: 12.5 G/DL (ref 12–15.9)
IMM GRANULOCYTES # BLD AUTO: 0.04 10*3/MM3 (ref 0–0.05)
IMM GRANULOCYTES NFR BLD AUTO: 0.4 % (ref 0–0.5)
IRON 24H UR-MRATE: 22 MCG/DL (ref 37–145)
LYMPHOCYTES # BLD AUTO: 2.58 10*3/MM3 (ref 0.7–3.1)
LYMPHOCYTES NFR BLD AUTO: 28.7 % (ref 19.6–45.3)
MCH RBC QN AUTO: 28.9 PG (ref 26.6–33)
MCHC RBC AUTO-ENTMCNC: 32.2 G/DL (ref 31.5–35.7)
MCV RBC AUTO: 89.8 FL (ref 79–97)
MONOCYTES # BLD AUTO: 0.54 10*3/MM3 (ref 0.1–0.9)
MONOCYTES NFR BLD AUTO: 6 % (ref 5–12)
NEUTROPHILS NFR BLD AUTO: 5.56 10*3/MM3 (ref 1.7–7)
NEUTROPHILS NFR BLD AUTO: 62 % (ref 42.7–76)
NRBC BLD AUTO-RTO: 0 /100 WBC (ref 0–0.2)
PLATELET # BLD AUTO: 258 10*3/MM3 (ref 140–450)
PMV BLD AUTO: 9.3 FL (ref 6–12)
POTASSIUM SERPL-SCNC: 3.7 MMOL/L (ref 3.5–5.2)
PREALB SERPL-MCNC: 15.7 MG/DL (ref 20–40)
PROT SERPL-MCNC: 6.1 G/DL (ref 6–8.5)
RBC # BLD AUTO: 4.32 10*6/MM3 (ref 3.77–5.28)
SODIUM SERPL-SCNC: 144 MMOL/L (ref 136–145)
WBC NRBC COR # BLD: 8.98 10*3/MM3 (ref 3.4–10.8)

## 2023-05-07 PROCEDURE — 96376 TX/PRO/DX INJ SAME DRUG ADON: CPT

## 2023-05-07 PROCEDURE — 25010000002 HYDROMORPHONE PER 4 MG: Performed by: SURGERY

## 2023-05-07 PROCEDURE — 96375 TX/PRO/DX INJ NEW DRUG ADDON: CPT

## 2023-05-07 PROCEDURE — 25010000002 PROCHLORPERAZINE 10 MG/2ML SOLUTION: Performed by: SURGERY

## 2023-05-07 PROCEDURE — 96372 THER/PROPH/DIAG INJ SC/IM: CPT

## 2023-05-07 PROCEDURE — 25010000002 NA FERRIC GLUC CPLX PER 12.5 MG: Performed by: SURGERY

## 2023-05-07 PROCEDURE — 80053 COMPREHEN METABOLIC PANEL: CPT | Performed by: SURGERY

## 2023-05-07 PROCEDURE — 25010000002 THIAMINE PER 100 MG: Performed by: SURGERY

## 2023-05-07 PROCEDURE — G0378 HOSPITAL OBSERVATION PER HR: HCPCS

## 2023-05-07 PROCEDURE — 25010000002 ENOXAPARIN PER 10 MG: Performed by: SURGERY

## 2023-05-07 PROCEDURE — 85025 COMPLETE CBC W/AUTO DIFF WBC: CPT | Performed by: SURGERY

## 2023-05-07 PROCEDURE — 83540 ASSAY OF IRON: CPT | Performed by: SURGERY

## 2023-05-07 PROCEDURE — 84134 ASSAY OF PREALBUMIN: CPT | Performed by: SURGERY

## 2023-05-07 PROCEDURE — 99024 POSTOP FOLLOW-UP VISIT: CPT | Performed by: SURGERY

## 2023-05-07 RX ORDER — SODIUM CHLORIDE 0.9 % (FLUSH) 0.9 %
10 SYRINGE (ML) INJECTION EVERY 12 HOURS SCHEDULED
Status: DISCONTINUED | OUTPATIENT
Start: 2023-05-07 | End: 2023-05-08

## 2023-05-07 RX ORDER — ENOXAPARIN SODIUM 100 MG/ML
40 INJECTION SUBCUTANEOUS DAILY
Status: DISCONTINUED | OUTPATIENT
Start: 2023-05-07 | End: 2023-05-08

## 2023-05-07 RX ORDER — SODIUM CHLORIDE 9 MG/ML
125 INJECTION, SOLUTION INTRAVENOUS CONTINUOUS
Status: DISCONTINUED | OUTPATIENT
Start: 2023-05-07 | End: 2023-05-08

## 2023-05-07 RX ORDER — SODIUM CHLORIDE 0.9 % (FLUSH) 0.9 %
10 SYRINGE (ML) INJECTION AS NEEDED
Status: DISCONTINUED | OUTPATIENT
Start: 2023-05-07 | End: 2023-05-08

## 2023-05-07 RX ORDER — ONDANSETRON 2 MG/ML
4 INJECTION INTRAMUSCULAR; INTRAVENOUS EVERY 6 HOURS PRN
Status: DISCONTINUED | OUTPATIENT
Start: 2023-05-07 | End: 2023-05-08

## 2023-05-07 RX ORDER — SODIUM CHLORIDE 9 MG/ML
40 INJECTION, SOLUTION INTRAVENOUS AS NEEDED
Status: DISCONTINUED | OUTPATIENT
Start: 2023-05-07 | End: 2023-05-08

## 2023-05-07 RX ORDER — SCOLOPAMINE TRANSDERMAL SYSTEM 1 MG/1
1 PATCH, EXTENDED RELEASE TRANSDERMAL
Status: DISCONTINUED | OUTPATIENT
Start: 2023-05-07 | End: 2023-05-08

## 2023-05-07 RX ADMIN — HYDROMORPHONE HYDROCHLORIDE 0.5 MG: 1 INJECTION, SOLUTION INTRAMUSCULAR; INTRAVENOUS; SUBCUTANEOUS at 16:57

## 2023-05-07 RX ADMIN — SODIUM CHLORIDE 125 ML/HR: 9 INJECTION, SOLUTION INTRAVENOUS at 10:24

## 2023-05-07 RX ADMIN — THIAMINE HYDROCHLORIDE 125 ML/HR: 100 INJECTION, SOLUTION INTRAMUSCULAR; INTRAVENOUS at 02:16

## 2023-05-07 RX ADMIN — TOPIRAMATE 25 MG: 25 TABLET, FILM COATED ORAL at 09:02

## 2023-05-07 RX ADMIN — HYDROMORPHONE HYDROCHLORIDE 0.5 MG: 1 INJECTION, SOLUTION INTRAMUSCULAR; INTRAVENOUS; SUBCUTANEOUS at 10:29

## 2023-05-07 RX ADMIN — SODIUM CHLORIDE 125 MG: 9 INJECTION, SOLUTION INTRAVENOUS at 12:02

## 2023-05-07 RX ADMIN — PROCHLORPERAZINE EDISYLATE 5 MG: 5 INJECTION INTRAMUSCULAR; INTRAVENOUS at 05:49

## 2023-05-07 RX ADMIN — SCOPALAMINE 1 PATCH: 1 PATCH, EXTENDED RELEASE TRANSDERMAL at 12:01

## 2023-05-07 RX ADMIN — HYDROMORPHONE HYDROCHLORIDE 0.5 MG: 1 INJECTION, SOLUTION INTRAMUSCULAR; INTRAVENOUS; SUBCUTANEOUS at 05:48

## 2023-05-07 RX ADMIN — HYDROMORPHONE HYDROCHLORIDE 0.5 MG: 1 INJECTION, SOLUTION INTRAMUSCULAR; INTRAVENOUS; SUBCUTANEOUS at 22:36

## 2023-05-07 RX ADMIN — ESCITALOPRAM OXALATE 10 MG: 10 TABLET ORAL at 09:02

## 2023-05-07 RX ADMIN — SODIUM CHLORIDE 125 ML/HR: 9 INJECTION, SOLUTION INTRAVENOUS at 17:57

## 2023-05-07 RX ADMIN — ENOXAPARIN SODIUM 40 MG: 40 INJECTION SUBCUTANEOUS at 12:02

## 2023-05-07 RX ADMIN — PANTOPRAZOLE SODIUM 40 MG: 40 INJECTION, POWDER, LYOPHILIZED, FOR SOLUTION INTRAVENOUS at 09:03

## 2023-05-07 NOTE — PLAN OF CARE
Goal Outcome Evaluation:  Plan of Care Reviewed With: patient        Progress: no change  Outcome Evaluation: VSS. RA. C/o epigastric and shoulder pain. PRNs administered as requested. IV iron infused. Vitamin bag infused. Fluids infusing at this time. Ambulating. IS to 1250 - 1500. Not tolerating PO medications at this time d/t pain and nausea.

## 2023-05-07 NOTE — PLAN OF CARE
Goal Outcome Evaluation:              Outcome Evaluation: Patient c/o being nauseous this morning, also of pain 5/10 to right shoulder, NAD noted.

## 2023-05-07 NOTE — PROGRESS NOTES
"                    Clinical Nutrition     Patient Name: Beverly Giron  YOB: 1985  MRN: [id number]  Date of Encounter: 05/07/23 11:00 EDT  Admission date: 5/6/2023    Reason for Visit   MST score 2+, \"Unsure\" unintentional weight loss    EMR Reviewed: Yes     Diet Nutrition Related History:      Pt is s/p laparoscopic sleeve gastrectomy and hiatal hernia repair (5/4/23). She is unable to tolerate oral intake since d/c from hospital. At home she reports consuming clear liquids besides chocolate ONS. She endorses after drinking any liquids having nausea and has to spit liquids up/vomit. She states that even bites of ice or sips of liquids today is intolerable. She reports UBW of 269# prior to surgery. Declines difficulty chewing/swallowing. NKFA.     Anthropometrics   Height: Height: 163.8 cm (64.5\")  Admission Weight:   Flowsheet Rows    Flowsheet Row First Filed Value   Admission Height 163.8 cm (64.5\") Documented at 05/06/2023 1828   Admission Weight 127 kg (279 lb 15.8 oz) Documented at 05/06/2023 1828        Last Filed Weight:Weight: 127 kg (279 lb 15.8 oz) (05/06/23 1828)  Weight Method: Stated  BMI: BMI (Calculated): 47.3  BMI classification: Obese Class III extreme obesity: > or equal to 40kg/m2   IBW: Ideal Body Weight (IBW) (kg): 56.15  EMR wts:   Weight      Weight (kg) Weight (lbs) Weight Method   7/18/2022 119.568 kg  263 lb 9.6 oz     12/14/2022 124.013 kg  273 lb 6.4 oz     1/24/2023 128.459 kg  283 lb 3.2 oz     4/18/2023 127.234 kg  280 lb 8 oz     5/4/2023 127.234 kg  280 lb 8 oz  Stated    5/6/2023 127 kg  279 lb 15.8 oz  Stated      UBW: 269# per pt   Weight change: Will order zero bed wt to determine      % change:    Time frame:         Current Nutrition Prescription     NPO Diet NPO Type: Strict NPO    Average Intake from Charting: No intakes recorded at this time      Intake History:     Intake Category  N/A d/t timeframe criteria at this time. However pt may meet " timeframe criteria later this week if PO intakes remain minimum.    Actions   Appropriateness for MSA:  Criteria for further malnutrition exam not met at this time. Follow per protocol.     Interventions:   Follow treatment progress, Care plan reviewed, Await begin PO, order zero bed wt     Dianna Barcenas,   Time Spent: 25 min.

## 2023-05-07 NOTE — H&P
BARIATRIC SURGERY H&P      Patient Care Team:  Hyun Wilks APRN as PCP - General (Nurse Practitioner)  Livia Salvador APRN as Nurse Practitioner (Nurse Practitioner)      Chief complaint: Intractable nausea and vomiting, dysphagia    Subjective     History of Present Illness    Beverly Giron is a 38 y.o. year old female status post 5/4/2023 laparoscopic sleeve gastrectomy and hiatal hernia repair.  She was doing well postoperatively and discharged on postop day 1.  She called the exchange yesterday with complaint of intractable nausea and vomiting, and inability to tolerate any oral intake.  She presented to the emergency room.  Labs are fairly unremarkable.  A CT scan did not show any evidence of leak or hematoma, but the distal esophagus was noted to be dilated and fluid-filled.  The images were reviewed by me also.  This morning, she notes she has been able to keep down a few sips of water and has had 2 pills, but they did cause epigastric pain and dysphagia.  At home, she was not able to tolerate any liquids including ice chips.    Incidentally, she did have a vein procedure on her right lower extremity yesterday.    Review of Systems   Constitutional: Negative for fever.   Gastrointestinal: Positive for nausea and vomiting. Negative for abdominal pain.        Past Medical History:   Diagnosis Date   • Anxiety and depression    • Carpal tunnel syndrome    • Elevated LDL cholesterol level    • Former smoker    • Frequent headaches    • Genital herpes    • GERD (gastroesophageal reflux disease)    • Heart murmur    • Heartburn     Tums PRN; EGD Dr. Canada 1/23   • Hiatal hernia with gastroesophageal reflux     EGD Dr. Canada 1/23   • History of DVT of lower extremity     following partial hysterectomy- believed to be provoked. Following with heme/onc   • History of partial hysterectomy 2021   • HPV (human papilloma virus) infection    • Hx of eye surgery    • Hx of ovarian cyst    • Low back pain     no  steroids. PRN NSAIDs   • Migraines     PRN flexeril and nsaids   • Mitral regurgitation     Mild   •  (normal spontaneous vaginal delivery)     x 3, 1st two and 4th deliveries.  No complics   • Varicose veins of both lower extremities    • Venous insufficiency      Past Surgical History:   Procedure Laterality Date   •  SECTION N/A     x 3. 3rd and last 2 deliveries   • ECHO - CONVERTED  2022    EF 60%. Trace -Mild MR. LA- 4.2   • ENDOSCOPY N/A 2023    Procedure: ESOPHAGOGASTRODUODENOSCOPY;  Surgeon: Jovanny Canada MD;  Location:  [facility];  Service: Bariatric;  Laterality: N/A;  scope    • EYE SURGERY Bilateral    • GASTRECTOMY N/A 2023    Procedure: LAPAROSCOPIC GASTRECTOMY, HIATAL HERNIA REPAIR LAPAROSCOPIC;  Surgeon: Jovanny Canada MD;  Location:  [facility];  Service: Bariatric;  Laterality: N/A;  hiatal hernia time: 6931-6290   • LAPAROSCOPIC TUBAL LIGATION     • SUBTOTAL HYSTERECTOMY      laparoscopic; still has ovaries, complicated by left DVT   • US VENOUS EXTREMITY UNILATERAL  2022    Normal, no DVT   • US VENOUS EXTREMITY UNILATERAL  02/10/2022    Partially occlusive superficial thrombophlebitis greater saphenous vein   • US VENOUS EXTREMITY UNILATERAL  2021    Small amount residual thrombus greater saphenous vein   • US VENOUS EXTREMITY UNILATERAL  2021    Superficial thrombosis greater saphenous vein   • US VENOUS EXTREMITY UNILATERAL  03/10/2021    Occlusive extensive thrombus within the greater saphenous vein from upper calf throughout the thigh   • VEIN LIGATION AND STRIPPING Bilateral      Family History   Problem Relation Age of Onset   • Hypertension Mother    • COPD Mother    • Sleep apnea Mother    • No Known Problems Father    • Scoliosis Sister    • No Known Problems Sister    • No Known Problems Sister    • Heart attack Maternal Grandmother    • Heart disease Maternal Grandmother    • Cancer Maternal Grandmother    • Liver  disease Maternal Grandfather    • Seizures Maternal Grandfather    • Stroke Maternal Grandfather    • No Known Problems Paternal Grandmother    • No Known Problems Daughter    • No Known Problems Daughter    • No Known Problems Daughter    • No Known Problems Son    • No Known Problems Son    • Heart attack Maternal Aunt 60   • Hypertension Maternal Aunt    • Hypertension Maternal Aunt    • Heart attack Maternal Uncle 59     Social History     Tobacco Use   • Smoking status: Former     Packs/day: 1.00     Years: 10.00     Pack years: 10.00     Types: Cigarettes     Quit date: 2022     Years since quittin.0   • Smokeless tobacco: Never   • Tobacco comments:     she had stopped in  for a few years then restarted 10/2021. Has stopped again 2022.   Vaping Use   • Vaping Use: Never used   Substance Use Topics   • Alcohol use: Yes     Comment: occasionally   • Drug use: Never     E-cigarette/Vaping   • E-cigarette/Vaping Use Never User      E-cigarette/Vaping Substances     Medications Prior to Admission   Medication Sig Dispense Refill Last Dose   • apixaban (ELIQUIS) 5 MG tablet tablet Take 1 tablet by mouth Daily. PT TO HOLD (3) DAYS PRIOR TO PROCEDURE PER DR. JON.   2023   • cyclobenzaprine (FLEXERIL) 10 MG tablet Take 1 tablet by mouth 2 (Two) Times a Day As Needed for Muscle Spasms.   2023   • escitalopram (LEXAPRO) 10 MG tablet Take 1 tablet by mouth Daily.   2023   • hydrOXYzine pamoate (VISTARIL) 25 MG capsule Take 1 capsule by mouth 3 (Three) Times a Day As Needed.   2023   • loratadine (CLARITIN) 10 MG tablet Take 1 tablet by mouth Daily.   2023   • omeprazole (priLOSEC) 40 MG capsule Take 1 capsule by mouth Daily for 60 days. 60 capsule 0 2023   • oxyCODONE (Roxicodone) 5 MG immediate release tablet Take 1 tablet by mouth Every 4 (Four) Hours As Needed for Moderate Pain. 12 tablet 0 2023   • topiramate (TOPAMAX) 25 MG tablet Take 1 tablet by mouth Daily.   2023    • CRANBERRY PO Take 525 mg by mouth Daily As Needed.      • docusate sodium (COLACE) 250 MG capsule Take 1 capsule by mouth Daily As Needed for Constipation.      • metroNIDAZOLE (METROGEL) 1 % gel Apply  topically to the appropriate area as directed 2 (Two) Times a Day.      • multivitamin with minerals tablet tablet Take 1 tablet by mouth Daily.      • ondansetron (Zofran) 4 MG tablet Take 1 tablet by mouth Every 4 (Four) Hours As Needed for Nausea. 10 tablet 0    • Probiotic Product (PROBIOTIC PO) Take  by mouth Daily.      • tretinoin (RETIN-A) 0.025 % gel Apply  topically to the appropriate area as directed Every Night.      • Vitamin D, Cholecalciferol, 50 MCG (2000 UT) capsule Take  by mouth Daily.        Allergies:  Patient has no known allergies.    Objective      Vital Signs  Temp:  [97.9 °F (36.6 °C)-98.8 °F (37.1 °C)] 97.9 °F (36.6 °C)  Heart Rate:  [] 71  Resp:  [14-18] 14  BP: (129-142)/(82-93) 134/93    Physical Exam  Vitals reviewed.   Constitutional:       Appearance: She is well-developed.   HENT:      Head: Normocephalic and atraumatic.      Nose: Nose normal.   Eyes:      Conjunctiva/sclera: Conjunctivae normal.      Pupils: Pupils are equal, round, and reactive to light.   Neck:      Thyroid: No thyromegaly.      Vascular: No carotid bruit.      Trachea: No tracheal deviation.   Cardiovascular:      Rate and Rhythm: Normal rate and regular rhythm.      Heart sounds: Normal heart sounds.   Pulmonary:      Effort: Pulmonary effort is normal. No respiratory distress.      Breath sounds: Normal breath sounds.   Abdominal:      General: There is no distension.      Palpations: Abdomen is soft.      Tenderness: There is no abdominal tenderness.      Comments: Incisions are well-healing.  Mild fading bruising.   Musculoskeletal:         General: No deformity. Normal range of motion.      Cervical back: Normal range of motion and neck supple.      Comments: Right lower extremity is wrapped from  ankle to knee with Ace bandage.   Skin:     General: Skin is warm and dry.      Findings: No rash.   Neurological:      Mental Status: She is alert and oriented to person, place, and time.      Cranial Nerves: No cranial nerve deficit.      Coordination: Coordination normal.   Psychiatric:         Behavior: Behavior normal.         Thought Content: Thought content normal.         Judgment: Judgment normal.         Results Review:   I reviewed the patient's new clinical results.  I reviewed the patient's new imaging results and agree with the interpretation.    Lab Results (last 24 hours)     Procedure Component Value Units Date/Time    COVID PRE-OP / PRE-PROCEDURE SCREENING ORDER (NO ISOLATION) - Swab, Nasopharynx [[id number]]  (Normal) Collected: 05/06/23 1926    Specimen: Swab from Nasopharynx Updated: 05/06/23 2029    Narrative:      The following orders were created for panel order COVID PRE-OP / PRE-PROCEDURE SCREENING ORDER (NO ISOLATION) - Swab, Nasopharynx.  Procedure                               Abnormality         Status                     ---------                               -----------         ------                     Respiratory Panel PCR w/...[[id number]]  Normal              Final result                 Please view results for these tests on the individual orders.    Respiratory Panel PCR w/COVID-19(SARS-CoV-2) [facility] In-House, NP Swab in [facility], 3-4 HR TAT - Swab, Nasopharynx [[id number]]  (Normal) Collected: 05/06/23 1926    Specimen: Swab from Nasopharynx Updated: 05/06/23 2029     ADENOVIRUS, PCR Not Detected     Coronavirus 229E Not Detected     Coronavirus HKU1 Not Detected     Coronavirus NL63 Not Detected     Coronavirus OC43 Not Detected     COVID19 Not Detected     Human Metapneumovirus Not Detected     Human Rhinovirus/Enterovirus Not Detected     Influenza A PCR Not Detected     Influenza B PCR Not Detected     Parainfluenza Virus 1 Not Detected     Parainfluenza  Virus 2 Not Detected     Parainfluenza Virus 3 Not Detected     Parainfluenza Virus 4 Not Detected     RSV, PCR Not Detected     Bordetella pertussis pcr Not Detected     Bordetella parapertussis PCR Not Detected     Chlamydophila pneumoniae PCR Not Detected     Mycoplasma pneumo by PCR Not Detected    Narrative:      In the setting of a positive respiratory panel with a viral infection PLUS a negative procalcitonin without other underlying concern for bacterial infection, consider observing off antibiotics or discontinuation of antibiotics and continue supportive care. If the respiratory panel is positive for atypical bacterial infection (Bordetella pertussis, Chlamydophila pneumoniae, or Mycoplasma pneumoniae), consider antibiotic de-escalation to target atypical bacterial infection.    Comprehensive Metabolic Panel [[id number]]  (Abnormal) Collected: 05/06/23 1925    Specimen: Blood Updated: 05/06/23 1950     Glucose 81 mg/dL      BUN 8 mg/dL      Creatinine 0.93 mg/dL      Sodium 141 mmol/L      Potassium 3.7 mmol/L      Chloride 107 mmol/L      CO2 24.0 mmol/L      Calcium 8.5 mg/dL      Total Protein 7.3 g/dL      Albumin 4.2 g/dL      ALT (SGPT) 9 U/L      AST (SGOT) 17 U/L      Alkaline Phosphatase 46 U/L      Total Bilirubin 0.3 mg/dL      Globulin 3.1 gm/dL      Comment: Calculated Result        A/G Ratio 1.4 g/dL      BUN/Creatinine Ratio 8.6     Anion Gap 10.0 mmol/L      eGFR 81.9 mL/min/1.73     Narrative:      GFR Normal >60  Chronic Kidney Disease <60  Kidney Failure <15      Lipase [[id number]]  (Normal) Collected: 05/06/23 1925    Specimen: Blood Updated: 05/06/23 1950     Lipase 45 U/L     Lactic Acid, Plasma [[id number]]  (Normal) Collected: 05/06/23 1925    Specimen: Blood Updated: 05/06/23 1948     Lactate 0.7 mmol/L      Comment: Falsely depressed results may occur on samples drawn from patients receiving N-Acetylcysteine (NAC) or Metamizole.       CBC & Differential [[id number]]  (Abnormal)  Collected: 05/06/23 1925    Specimen: Blood Updated: 05/06/23 1933    Narrative:      The following orders were created for panel order CBC & Differential.  Procedure                               Abnormality         Status                     ---------                               -----------         ------                     CBC Auto Differential[[id number]]        Abnormal            Final result                 Please view results for these tests on the individual orders.    CBC Auto Differential [[id number]]  (Abnormal) Collected: 05/06/23 1925    Specimen: Blood Updated: 05/06/23 1933     WBC 10.96 10*3/mm3      RBC 4.66 10*6/mm3      Hemoglobin 13.7 g/dL      Hematocrit 42.4 %      MCV 91.0 fL      MCH 29.4 pg      MCHC 32.3 g/dL      RDW 13.8 %      RDW-SD 46.5 fl      MPV 9.3 fL      Platelets 305 10*3/mm3      Neutrophil % 59.2 %      Lymphocyte % 30.9 %      Monocyte % 7.3 %      Eosinophil % 1.9 %      Basophil % 0.5 %      Immature Grans % 0.2 %      Neutrophils, Absolute 6.49 10*3/mm3      Lymphocytes, Absolute 3.39 10*3/mm3      Monocytes, Absolute 0.80 10*3/mm3      Eosinophils, Absolute 0.21 10*3/mm3      Basophils, Absolute 0.05 10*3/mm3      Immature Grans, Absolute 0.02 10*3/mm3      nRBC 0.0 /100 WBC           Imaging Results (Last 24 Hours)     Procedure Component Value Units Date/Time    CT Abdomen Pelvis Without Contrast [[id number]] Collected: 05/06/23 2008     Updated: 05/06/23 2017    Narrative:      CT ABDOMEN PELVIS WO CONTRAST    Date of Exam: 5/6/2023 7:30 PM EDT    Indication: nausea/vomiting/upper abdominal pain.    Comparison: None available.    Technique: Axial CT images were obtained of the abdomen and pelvis without the administration of contrast. Reconstructed coronal and sagittal images were also obtained. Automated exposure control and iterative construction methods were used.      Findings:  History indicates recent gastric surgery, now with nausea and vomiting.    Included  lung bases appear grossly clear. Distal esophagus is fluid distended. The patient's gastric sleeve staple line is smooth margined, and appears intact. No fluid collection or inflammatory changes seen here. No fluid is noted within the gastric   sleeve, and potentially, there could be spasm at the GE junction. No soft tissue abnormality is seen here however.    Residual contrast is seen in the colon from previous upper GI series. Large and small bowel loops appear grossly normal. No intra-abdominal free air or ascites, adenopathy or acute inflammatory focus is identified.    There is mild diffuse fatty liver change. No significant abnormalities are seen in the gallbladder, spleen, pancreas, adrenal glands, or kidneys. Bladder is normally distended and normal in appearance. Uterus by history is surgically absent. Ovaries by   history remain, but appear to be atrophic and are not well seen. No adnexal masses identified. Bony structures appear to be intact.      Impression:      Impression:    1. Expected postoperative changes of gastric sleeve surgery with no visible inflammation, fluid collection, or extraluminal air.    2. Esophagus appears fluid-distended, with no corresponding distention of the gastric sleeve. In addition to possible reflux, this could reflect spasm at the GE junction. No visible obstructing lesion.     3. No evidence of acute intra-abdominal intrapelvic disease elsewhere.            Electronically Signed: Ino Alonzo    5/6/2023 8:14 PM EDT    Workstation ID: [id number]            Assessment & Plan       Nausea and vomiting in adult      Assessment & Plan    38-year-old female who is postoperative day 3 status post laparoscopic sleeve gastrectomy and hiatal hernia repair, readmitted with intractable nausea and vomiting, dysphagia.    IV fluids, rally bag.  Check iron and prealbumin with a.m. labs today.  IV PPI, Lovenox, ambulation, and pulmonary toilet.    Upper GI is ordered, but may not be  available on a Sunday.  I have also ordered an ultrasound just to rule out other causes of intractable nausea vomiting.  From her symptoms and her CT scan, I suspect that she has postoperative edema in the sleeve and/or hiatus causing her symptoms.  This is consistent with the immediate postoperative upper GI that noted edema.  Hopefully she will improve with time and GI rest.    I discussed the patients findings and my recommendations with patient and nursing staff     Jazmín Muniz MD  05/07/23  10:36 EDT

## 2023-05-08 ENCOUNTER — ANESTHESIA EVENT CONVERTED (OUTPATIENT)
Dept: ANESTHESIOLOGY | Facility: HOSPITAL | Age: 38
End: 2023-05-08
Payer: MEDICAID

## 2023-05-08 ENCOUNTER — APPOINTMENT (OUTPATIENT)
Dept: ULTRASOUND IMAGING | Facility: HOSPITAL | Age: 38
End: 2023-05-08
Payer: MEDICAID

## 2023-05-08 ENCOUNTER — ANESTHESIA (OUTPATIENT)
Dept: PERIOP | Facility: HOSPITAL | Age: 38
End: 2023-05-08
Payer: MEDICAID

## 2023-05-08 ENCOUNTER — ANESTHESIA EVENT (OUTPATIENT)
Dept: PERIOP | Facility: HOSPITAL | Age: 38
End: 2023-05-08
Payer: MEDICAID

## 2023-05-08 ENCOUNTER — APPOINTMENT (OUTPATIENT)
Dept: GENERAL RADIOLOGY | Facility: HOSPITAL | Age: 38
End: 2023-05-08
Payer: MEDICAID

## 2023-05-08 PROBLEM — R11.2 NAUSEA AND VOMITING IN ADULT: Status: RESOLVED | Noted: 2023-05-07 | Resolved: 2023-05-08

## 2023-05-08 LAB
ALBUMIN SERPL-MCNC: 3.4 G/DL (ref 3.5–5.2)
ALBUMIN/GLOB SERPL: 1.2 G/DL
ALP SERPL-CCNC: 45 U/L (ref 39–117)
ALT SERPL W P-5'-P-CCNC: 10 U/L (ref 1–33)
ANION GAP SERPL CALCULATED.3IONS-SCNC: 11 MMOL/L (ref 5–15)
AST SERPL-CCNC: 17 U/L (ref 1–32)
BASOPHILS # BLD AUTO: 0.03 10*3/MM3 (ref 0–0.2)
BASOPHILS NFR BLD AUTO: 0.4 % (ref 0–1.5)
BILIRUB SERPL-MCNC: 0.3 MG/DL (ref 0–1.2)
BUN SERPL-MCNC: 7 MG/DL (ref 6–20)
BUN/CREAT SERPL: 9 (ref 7–25)
CALCIUM SPEC-SCNC: 7.9 MG/DL (ref 8.6–10.5)
CHLORIDE SERPL-SCNC: 110 MMOL/L (ref 98–107)
CO2 SERPL-SCNC: 20 MMOL/L (ref 22–29)
CREAT SERPL-MCNC: 0.78 MG/DL (ref 0.57–1)
CRP SERPL-MCNC: 4.45 MG/DL (ref 0–0.5)
DEPRECATED RDW RBC AUTO: 46.5 FL (ref 37–54)
EGFRCR SERPLBLD CKD-EPI 2021: 101.1 ML/MIN/1.73
EOSINOPHIL # BLD AUTO: 0.24 10*3/MM3 (ref 0–0.4)
EOSINOPHIL NFR BLD AUTO: 3 % (ref 0.3–6.2)
ERYTHROCYTE [DISTWIDTH] IN BLOOD BY AUTOMATED COUNT: 13.7 % (ref 12.3–15.4)
GLOBULIN UR ELPH-MCNC: 2.8 GM/DL
GLUCOSE BLDC GLUCOMTR-MCNC: 64 MG/DL (ref 70–130)
GLUCOSE SERPL-MCNC: 64 MG/DL (ref 65–99)
HCT VFR BLD AUTO: 38.7 % (ref 34–46.6)
HGB BLD-MCNC: 12.3 G/DL (ref 12–15.9)
IMM GRANULOCYTES # BLD AUTO: 0.04 10*3/MM3 (ref 0–0.05)
IMM GRANULOCYTES NFR BLD AUTO: 0.5 % (ref 0–0.5)
LYMPHOCYTES # BLD AUTO: 2.29 10*3/MM3 (ref 0.7–3.1)
LYMPHOCYTES NFR BLD AUTO: 28.8 % (ref 19.6–45.3)
MCH RBC QN AUTO: 29.6 PG (ref 26.6–33)
MCHC RBC AUTO-ENTMCNC: 31.8 G/DL (ref 31.5–35.7)
MCV RBC AUTO: 93.3 FL (ref 79–97)
MONOCYTES # BLD AUTO: 0.41 10*3/MM3 (ref 0.1–0.9)
MONOCYTES NFR BLD AUTO: 5.2 % (ref 5–12)
NEUTROPHILS NFR BLD AUTO: 4.95 10*3/MM3 (ref 1.7–7)
NEUTROPHILS NFR BLD AUTO: 62.1 % (ref 42.7–76)
NRBC BLD AUTO-RTO: 0 /100 WBC (ref 0–0.2)
PLATELET # BLD AUTO: 247 10*3/MM3 (ref 140–450)
PMV BLD AUTO: 9.6 FL (ref 6–12)
POTASSIUM SERPL-SCNC: 3.9 MMOL/L (ref 3.5–5.2)
PROT SERPL-MCNC: 6.2 G/DL (ref 6–8.5)
QT INTERVAL: 402 MS
QTC INTERVAL: 434 MS
RBC # BLD AUTO: 4.15 10*6/MM3 (ref 3.77–5.28)
SODIUM SERPL-SCNC: 141 MMOL/L (ref 136–145)
WBC NRBC COR # BLD: 7.96 10*3/MM3 (ref 3.4–10.8)

## 2023-05-08 PROCEDURE — 25010000002 HYDROMORPHONE 1 MG/ML SOLUTION: Performed by: SURGERY

## 2023-05-08 PROCEDURE — 25010000002 MORPHINE PER 10 MG: Performed by: SURGERY

## 2023-05-08 PROCEDURE — 25010000002 HYDROMORPHONE 1 MG/ML SOLUTION

## 2023-05-08 PROCEDURE — 82948 REAGENT STRIP/BLOOD GLUCOSE: CPT

## 2023-05-08 PROCEDURE — 96372 THER/PROPH/DIAG INJ SC/IM: CPT

## 2023-05-08 PROCEDURE — 80053 COMPREHEN METABOLIC PANEL: CPT | Performed by: SURGERY

## 2023-05-08 PROCEDURE — G0378 HOSPITAL OBSERVATION PER HR: HCPCS

## 2023-05-08 PROCEDURE — 74240 X-RAY XM UPR GI TRC 1CNTRST: CPT

## 2023-05-08 PROCEDURE — 25010000002 ENOXAPARIN PER 10 MG: Performed by: SURGERY

## 2023-05-08 PROCEDURE — 85025 COMPLETE CBC W/AUTO DIFF WBC: CPT | Performed by: SURGERY

## 2023-05-08 PROCEDURE — 96376 TX/PRO/DX INJ SAME DRUG ADON: CPT

## 2023-05-08 PROCEDURE — 25010000002 DEXAMETHASONE SODIUM PHOSPHATE 10 MG/ML SOLUTION

## 2023-05-08 PROCEDURE — 86140 C-REACTIVE PROTEIN: CPT

## 2023-05-08 PROCEDURE — 25010000002 FENTANYL CITRATE (PF) 50 MCG/ML SOLUTION

## 2023-05-08 PROCEDURE — 25010000002 DEXAMETHASONE PER 1 MG

## 2023-05-08 PROCEDURE — 25010000002 CEFAZOLIN PER 500 MG: Performed by: SURGERY

## 2023-05-08 PROCEDURE — 25010000002 ONDANSETRON PER 1 MG: Performed by: SURGERY

## 2023-05-08 PROCEDURE — 93005 ELECTROCARDIOGRAM TRACING: CPT | Performed by: SURGERY

## 2023-05-08 PROCEDURE — 99024 POSTOP FOLLOW-UP VISIT: CPT | Performed by: SURGERY

## 2023-05-08 PROCEDURE — 43281 LAP PARAESOPHAG HERN REPAIR: CPT | Performed by: SURGERY

## 2023-05-08 PROCEDURE — 25010000002 DIPHENHYDRAMINE PER 50 MG: Performed by: SURGERY

## 2023-05-08 PROCEDURE — 25010000002 FENTANYL CITRATE (PF) 100 MCG/2ML SOLUTION

## 2023-05-08 PROCEDURE — 25010000002 DROPERIDOL PER 5 MG

## 2023-05-08 PROCEDURE — 94799 UNLISTED PULMONARY SVC/PX: CPT

## 2023-05-08 PROCEDURE — 0 DIATRIZOATE MEGLUMINE & SODIUM PER 1 ML: Performed by: SURGERY

## 2023-05-08 PROCEDURE — 25010000002 HYDROMORPHONE PER 4 MG: Performed by: SURGERY

## 2023-05-08 PROCEDURE — 76705 ECHO EXAM OF ABDOMEN: CPT

## 2023-05-08 PROCEDURE — 25010000002 SUGAMMADEX 500 MG/5ML SOLUTION

## 2023-05-08 PROCEDURE — 25010000002 PROPOFOL 10 MG/ML EMULSION

## 2023-05-08 DEVICE — DEV CONTRL TISS STRATAFIX SPIRAL PDS PLS 3/0 0 15CM VIL: Type: IMPLANTABLE DEVICE | Site: ABDOMEN | Status: FUNCTIONAL

## 2023-05-08 DEVICE — PBT NON ABSORBABLE WOUND CLOSURE DEVICE
Type: IMPLANTABLE DEVICE | Site: ABDOMEN | Status: FUNCTIONAL
Brand: V-LOC

## 2023-05-08 DEVICE — ABSORBABLE WOUND CLOSURE DEVICE
Type: IMPLANTABLE DEVICE | Site: ABDOMEN | Status: FUNCTIONAL
Brand: SYNETURE

## 2023-05-08 RX ORDER — DROPERIDOL 2.5 MG/ML
INJECTION, SOLUTION INTRAMUSCULAR; INTRAVENOUS
Status: COMPLETED
Start: 2023-05-08 | End: 2023-05-08

## 2023-05-08 RX ORDER — ENOXAPARIN SODIUM 100 MG/ML
40 INJECTION SUBCUTANEOUS EVERY 12 HOURS SCHEDULED
Status: DISCONTINUED | OUTPATIENT
Start: 2023-05-09 | End: 2023-05-09 | Stop reason: HOSPADM

## 2023-05-08 RX ORDER — ONDANSETRON 2 MG/ML
4 INJECTION INTRAMUSCULAR; INTRAVENOUS ONCE AS NEEDED
Status: DISCONTINUED | OUTPATIENT
Start: 2023-05-08 | End: 2023-05-08 | Stop reason: HOSPADM

## 2023-05-08 RX ORDER — FENTANYL CITRATE 50 UG/ML
INJECTION, SOLUTION INTRAMUSCULAR; INTRAVENOUS AS NEEDED
Status: DISCONTINUED | OUTPATIENT
Start: 2023-05-08 | End: 2023-05-08 | Stop reason: SURG

## 2023-05-08 RX ORDER — CEFAZOLIN SODIUM IN 0.9 % NACL 3 G/100 ML
3 INTRAVENOUS SOLUTION, PIGGYBACK (ML) INTRAVENOUS EVERY 8 HOURS
Status: COMPLETED | OUTPATIENT
Start: 2023-05-08 | End: 2023-05-09

## 2023-05-08 RX ORDER — DEXAMETHASONE SODIUM PHOSPHATE 10 MG/ML
INJECTION, SOLUTION INTRAMUSCULAR; INTRAVENOUS
Status: COMPLETED | OUTPATIENT
Start: 2023-05-08 | End: 2023-05-08

## 2023-05-08 RX ORDER — PANTOPRAZOLE SODIUM 40 MG/10ML
40 INJECTION, POWDER, LYOPHILIZED, FOR SOLUTION INTRAVENOUS
Status: DISCONTINUED | OUTPATIENT
Start: 2023-05-09 | End: 2023-05-09 | Stop reason: HOSPADM

## 2023-05-08 RX ORDER — DIPHENHYDRAMINE HYDROCHLORIDE 50 MG/ML
25 INJECTION INTRAMUSCULAR; INTRAVENOUS EVERY 4 HOURS PRN
Status: DISCONTINUED | OUTPATIENT
Start: 2023-05-08 | End: 2023-05-09 | Stop reason: HOSPADM

## 2023-05-08 RX ORDER — HYDROCODONE BITARTRATE AND ACETAMINOPHEN 5; 325 MG/1; MG/1
1 TABLET ORAL ONCE AS NEEDED
Status: DISCONTINUED | OUTPATIENT
Start: 2023-05-08 | End: 2023-05-08 | Stop reason: HOSPADM

## 2023-05-08 RX ORDER — NALOXONE HCL 0.4 MG/ML
0.4 VIAL (ML) INJECTION AS NEEDED
Status: DISCONTINUED | OUTPATIENT
Start: 2023-05-08 | End: 2023-05-08 | Stop reason: HOSPADM

## 2023-05-08 RX ORDER — PROMETHAZINE HYDROCHLORIDE 25 MG/1
25 SUPPOSITORY RECTAL ONCE AS NEEDED
Status: DISCONTINUED | OUTPATIENT
Start: 2023-05-08 | End: 2023-05-08 | Stop reason: HOSPADM

## 2023-05-08 RX ORDER — CEFAZOLIN SODIUM IN 0.9 % NACL 3 G/100 ML
3 INTRAVENOUS SOLUTION, PIGGYBACK (ML) INTRAVENOUS ONCE
Status: COMPLETED | OUTPATIENT
Start: 2023-05-08 | End: 2023-05-08

## 2023-05-08 RX ORDER — LORAZEPAM 2 MG/ML
0.5 INJECTION INTRAMUSCULAR EVERY 12 HOURS PRN
Status: DISCONTINUED | OUTPATIENT
Start: 2023-05-08 | End: 2023-05-09 | Stop reason: HOSPADM

## 2023-05-08 RX ORDER — IPRATROPIUM BROMIDE AND ALBUTEROL SULFATE 2.5; .5 MG/3ML; MG/3ML
3 SOLUTION RESPIRATORY (INHALATION) ONCE AS NEEDED
Status: DISCONTINUED | OUTPATIENT
Start: 2023-05-08 | End: 2023-05-08 | Stop reason: HOSPADM

## 2023-05-08 RX ORDER — FENTANYL CITRATE 50 UG/ML
INJECTION, SOLUTION INTRAMUSCULAR; INTRAVENOUS
Status: COMPLETED
Start: 2023-05-08 | End: 2023-05-08

## 2023-05-08 RX ORDER — HYDRALAZINE HYDROCHLORIDE 20 MG/ML
5 INJECTION INTRAMUSCULAR; INTRAVENOUS
Status: DISCONTINUED | OUTPATIENT
Start: 2023-05-08 | End: 2023-05-08 | Stop reason: HOSPADM

## 2023-05-08 RX ORDER — LIDOCAINE HYDROCHLORIDE 10 MG/ML
0.5 INJECTION, SOLUTION EPIDURAL; INFILTRATION; INTRACAUDAL; PERINEURAL ONCE AS NEEDED
Status: DISCONTINUED | OUTPATIENT
Start: 2023-05-08 | End: 2023-05-08 | Stop reason: HOSPADM

## 2023-05-08 RX ORDER — HYDRALAZINE HYDROCHLORIDE 20 MG/ML
10 INJECTION INTRAMUSCULAR; INTRAVENOUS
Status: DISCONTINUED | OUTPATIENT
Start: 2023-05-08 | End: 2023-05-09 | Stop reason: HOSPADM

## 2023-05-08 RX ORDER — ONDANSETRON 2 MG/ML
4 INJECTION INTRAMUSCULAR; INTRAVENOUS EVERY 4 HOURS PRN
Status: DISCONTINUED | OUTPATIENT
Start: 2023-05-08 | End: 2023-05-09 | Stop reason: HOSPADM

## 2023-05-08 RX ORDER — GABAPENTIN 100 MG/1
100 CAPSULE ORAL 3 TIMES DAILY
Status: DISCONTINUED | OUTPATIENT
Start: 2023-05-08 | End: 2023-05-09 | Stop reason: HOSPADM

## 2023-05-08 RX ORDER — MORPHINE SULFATE 4 MG/ML
4 INJECTION, SOLUTION INTRAMUSCULAR; INTRAVENOUS
Status: DISCONTINUED | OUTPATIENT
Start: 2023-05-08 | End: 2023-05-08

## 2023-05-08 RX ORDER — OXYCODONE HYDROCHLORIDE 5 MG/1
5 TABLET ORAL EVERY 6 HOURS PRN
Status: DISCONTINUED | OUTPATIENT
Start: 2023-05-08 | End: 2023-05-09 | Stop reason: HOSPADM

## 2023-05-08 RX ORDER — NALOXONE HCL 0.4 MG/ML
0.1 VIAL (ML) INJECTION
Status: DISCONTINUED | OUTPATIENT
Start: 2023-05-08 | End: 2023-05-09 | Stop reason: HOSPADM

## 2023-05-08 RX ORDER — MAGNESIUM HYDROXIDE 1200 MG/15ML
LIQUID ORAL AS NEEDED
Status: DISCONTINUED | OUTPATIENT
Start: 2023-05-08 | End: 2023-05-08 | Stop reason: HOSPADM

## 2023-05-08 RX ORDER — PROMETHAZINE HYDROCHLORIDE 25 MG/1
25 TABLET ORAL ONCE AS NEEDED
Status: DISCONTINUED | OUTPATIENT
Start: 2023-05-08 | End: 2023-05-08 | Stop reason: HOSPADM

## 2023-05-08 RX ORDER — SODIUM CHLORIDE 0.9 % (FLUSH) 0.9 %
3 SYRINGE (ML) INJECTION EVERY 12 HOURS SCHEDULED
Status: DISCONTINUED | OUTPATIENT
Start: 2023-05-08 | End: 2023-05-08 | Stop reason: HOSPADM

## 2023-05-08 RX ORDER — LORAZEPAM 1 MG/1
1 TABLET ORAL EVERY 12 HOURS PRN
Status: DISCONTINUED | OUTPATIENT
Start: 2023-05-08 | End: 2023-05-09 | Stop reason: HOSPADM

## 2023-05-08 RX ORDER — NALOXONE HCL 0.4 MG/ML
0.4 VIAL (ML) INJECTION
Status: DISCONTINUED | OUTPATIENT
Start: 2023-05-08 | End: 2023-05-08

## 2023-05-08 RX ORDER — BUPIVACAINE HYDROCHLORIDE 2.5 MG/ML
INJECTION, SOLUTION EPIDURAL; INFILTRATION; INTRACAUDAL
Status: COMPLETED | OUTPATIENT
Start: 2023-05-08 | End: 2023-05-08

## 2023-05-08 RX ORDER — ALBUTEROL SULFATE 2.5 MG/3ML
2.5 SOLUTION RESPIRATORY (INHALATION) EVERY 4 HOURS PRN
Status: DISCONTINUED | OUTPATIENT
Start: 2023-05-08 | End: 2023-05-09 | Stop reason: HOSPADM

## 2023-05-08 RX ORDER — ONDANSETRON 4 MG/1
4 TABLET, FILM COATED ORAL EVERY 4 HOURS PRN
Status: DISCONTINUED | OUTPATIENT
Start: 2023-05-08 | End: 2023-05-08 | Stop reason: SDUPTHER

## 2023-05-08 RX ORDER — ACETAMINOPHEN 500 MG
1000 TABLET ORAL EVERY 8 HOURS SCHEDULED
Status: DISCONTINUED | OUTPATIENT
Start: 2023-05-08 | End: 2023-05-09 | Stop reason: HOSPADM

## 2023-05-08 RX ORDER — SODIUM CHLORIDE 9 MG/ML
40 INJECTION, SOLUTION INTRAVENOUS AS NEEDED
Status: DISCONTINUED | OUTPATIENT
Start: 2023-05-08 | End: 2023-05-08 | Stop reason: HOSPADM

## 2023-05-08 RX ORDER — SIMETHICONE 80 MG
80 TABLET,CHEWABLE ORAL 4 TIMES DAILY PRN
Status: DISCONTINUED | OUTPATIENT
Start: 2023-05-08 | End: 2023-05-09 | Stop reason: HOSPADM

## 2023-05-08 RX ORDER — GABAPENTIN 250 MG/5ML
100 SOLUTION ORAL 3 TIMES DAILY
Status: DISCONTINUED | OUTPATIENT
Start: 2023-05-08 | End: 2023-05-09 | Stop reason: HOSPADM

## 2023-05-08 RX ORDER — SODIUM CHLORIDE, SODIUM LACTATE, POTASSIUM CHLORIDE, CALCIUM CHLORIDE 600; 310; 30; 20 MG/100ML; MG/100ML; MG/100ML; MG/100ML
1000 INJECTION, SOLUTION INTRAVENOUS CONTINUOUS
Status: DISCONTINUED | OUTPATIENT
Start: 2023-05-08 | End: 2023-05-08

## 2023-05-08 RX ORDER — ACETAMINOPHEN 160 MG/5ML
1000 SOLUTION ORAL EVERY 8 HOURS SCHEDULED
Status: DISCONTINUED | OUTPATIENT
Start: 2023-05-08 | End: 2023-05-09 | Stop reason: HOSPADM

## 2023-05-08 RX ORDER — DROPERIDOL 2.5 MG/ML
0.62 INJECTION, SOLUTION INTRAMUSCULAR; INTRAVENOUS
Status: DISCONTINUED | OUTPATIENT
Start: 2023-05-08 | End: 2023-05-08 | Stop reason: HOSPADM

## 2023-05-08 RX ORDER — DROPERIDOL 2.5 MG/ML
0.62 INJECTION, SOLUTION INTRAMUSCULAR; INTRAVENOUS ONCE AS NEEDED
Status: DISCONTINUED | OUTPATIENT
Start: 2023-05-08 | End: 2023-05-08 | Stop reason: HOSPADM

## 2023-05-08 RX ORDER — PROPOFOL 10 MG/ML
VIAL (ML) INTRAVENOUS AS NEEDED
Status: DISCONTINUED | OUTPATIENT
Start: 2023-05-08 | End: 2023-05-08 | Stop reason: SURG

## 2023-05-08 RX ORDER — SODIUM CHLORIDE 0.9 % (FLUSH) 0.9 %
3-10 SYRINGE (ML) INJECTION AS NEEDED
Status: DISCONTINUED | OUTPATIENT
Start: 2023-05-08 | End: 2023-05-08 | Stop reason: HOSPADM

## 2023-05-08 RX ORDER — DEXMEDETOMIDINE HYDROCHLORIDE 100 UG/ML
INJECTION, SOLUTION INTRAVENOUS AS NEEDED
Status: DISCONTINUED | OUTPATIENT
Start: 2023-05-08 | End: 2023-05-08 | Stop reason: SURG

## 2023-05-08 RX ORDER — ROCURONIUM BROMIDE 10 MG/ML
INJECTION, SOLUTION INTRAVENOUS AS NEEDED
Status: DISCONTINUED | OUTPATIENT
Start: 2023-05-08 | End: 2023-05-08 | Stop reason: SURG

## 2023-05-08 RX ORDER — PROMETHAZINE HYDROCHLORIDE 12.5 MG/1
12.5 TABLET ORAL EVERY 6 HOURS PRN
Status: DISCONTINUED | OUTPATIENT
Start: 2023-05-08 | End: 2023-05-09 | Stop reason: HOSPADM

## 2023-05-08 RX ORDER — LIDOCAINE HYDROCHLORIDE 10 MG/ML
INJECTION, SOLUTION EPIDURAL; INFILTRATION; INTRACAUDAL; PERINEURAL AS NEEDED
Status: DISCONTINUED | OUTPATIENT
Start: 2023-05-08 | End: 2023-05-08 | Stop reason: SURG

## 2023-05-08 RX ORDER — PROCHLORPERAZINE MALEATE 10 MG
10 TABLET ORAL EVERY 6 HOURS PRN
Status: DISCONTINUED | OUTPATIENT
Start: 2023-05-08 | End: 2023-05-09 | Stop reason: HOSPADM

## 2023-05-08 RX ORDER — SODIUM CHLORIDE 0.9 % (FLUSH) 0.9 %
10 SYRINGE (ML) INJECTION AS NEEDED
Status: DISCONTINUED | OUTPATIENT
Start: 2023-05-08 | End: 2023-05-08 | Stop reason: HOSPADM

## 2023-05-08 RX ORDER — ONDANSETRON 4 MG/1
4 TABLET, FILM COATED ORAL EVERY 6 HOURS PRN
Status: DISCONTINUED | OUTPATIENT
Start: 2023-05-12 | End: 2023-05-09 | Stop reason: HOSPADM

## 2023-05-08 RX ORDER — FENTANYL CITRATE 50 UG/ML
50 INJECTION, SOLUTION INTRAMUSCULAR; INTRAVENOUS
Status: DISCONTINUED | OUTPATIENT
Start: 2023-05-08 | End: 2023-05-08 | Stop reason: HOSPADM

## 2023-05-08 RX ORDER — DEXAMETHASONE SODIUM PHOSPHATE 4 MG/ML
INJECTION, SOLUTION INTRA-ARTICULAR; INTRALESIONAL; INTRAMUSCULAR; INTRAVENOUS; SOFT TISSUE AS NEEDED
Status: DISCONTINUED | OUTPATIENT
Start: 2023-05-08 | End: 2023-05-08 | Stop reason: SURG

## 2023-05-08 RX ORDER — LABETALOL HYDROCHLORIDE 5 MG/ML
5 INJECTION, SOLUTION INTRAVENOUS
Status: DISCONTINUED | OUTPATIENT
Start: 2023-05-08 | End: 2023-05-08 | Stop reason: HOSPADM

## 2023-05-08 RX ORDER — SODIUM CHLORIDE, SODIUM LACTATE, POTASSIUM CHLORIDE, CALCIUM CHLORIDE 600; 310; 30; 20 MG/100ML; MG/100ML; MG/100ML; MG/100ML
150 INJECTION, SOLUTION INTRAVENOUS CONTINUOUS
Status: DISCONTINUED | OUTPATIENT
Start: 2023-05-08 | End: 2023-05-09 | Stop reason: HOSPADM

## 2023-05-08 RX ORDER — SODIUM CHLORIDE AND POTASSIUM CHLORIDE 150; 450 MG/100ML; MG/100ML
125 INJECTION, SOLUTION INTRAVENOUS CONTINUOUS
Status: DISCONTINUED | OUTPATIENT
Start: 2023-05-09 | End: 2023-05-09 | Stop reason: HOSPADM

## 2023-05-08 RX ORDER — HYDROMORPHONE HYDROCHLORIDE 2 MG/1
2 TABLET ORAL EVERY 4 HOURS PRN
Status: DISCONTINUED | OUTPATIENT
Start: 2023-05-08 | End: 2023-05-09 | Stop reason: HOSPADM

## 2023-05-08 RX ORDER — ALPRAZOLAM 0.25 MG/1
0.25 TABLET ORAL ONCE AS NEEDED
Status: DISCONTINUED | OUTPATIENT
Start: 2023-05-08 | End: 2023-05-09 | Stop reason: HOSPADM

## 2023-05-08 RX ADMIN — PANTOPRAZOLE SODIUM 40 MG: 40 INJECTION, POWDER, LYOPHILIZED, FOR SOLUTION INTRAVENOUS at 05:21

## 2023-05-08 RX ADMIN — HYDROMORPHONE HYDROCHLORIDE 0.5 MG: 1 INJECTION, SOLUTION INTRAMUSCULAR; INTRAVENOUS; SUBCUTANEOUS at 15:34

## 2023-05-08 RX ADMIN — DEXMEDETOMIDINE HYDROCHLORIDE 4 MCG: 100 INJECTION, SOLUTION INTRAVENOUS at 13:59

## 2023-05-08 RX ADMIN — CETIRIZINE HYDROCHLORIDE 10 MG: 10 TABLET, FILM COATED ORAL at 08:32

## 2023-05-08 RX ADMIN — HYDROMORPHONE HYDROCHLORIDE 0.5 MG: 1 INJECTION, SOLUTION INTRAMUSCULAR; INTRAVENOUS; SUBCUTANEOUS at 06:17

## 2023-05-08 RX ADMIN — ROCURONIUM BROMIDE 10 MG: 10 SOLUTION INTRAVENOUS at 14:45

## 2023-05-08 RX ADMIN — PROPOFOL 200 MG: 10 INJECTION, EMULSION INTRAVENOUS at 13:42

## 2023-05-08 RX ADMIN — MORPHINE SULFATE 4 MG: 4 INJECTION, SOLUTION INTRAMUSCULAR; INTRAVENOUS at 19:31

## 2023-05-08 RX ADMIN — ACETAMINOPHEN 1000 MG: 650 SOLUTION ORAL at 18:03

## 2023-05-08 RX ADMIN — CEFAZOLIN 3 G: 10 INJECTION, POWDER, FOR SOLUTION INTRAVENOUS at 13:45

## 2023-05-08 RX ADMIN — DIPHENHYDRAMINE HYDROCHLORIDE 25 MG: 50 INJECTION, SOLUTION INTRAMUSCULAR; INTRAVENOUS at 19:43

## 2023-05-08 RX ADMIN — HYDROMORPHONE HYDROCHLORIDE 0.5 MG: 1 INJECTION, SOLUTION INTRAMUSCULAR; INTRAVENOUS; SUBCUTANEOUS at 11:14

## 2023-05-08 RX ADMIN — SODIUM CHLORIDE, POTASSIUM CHLORIDE, SODIUM LACTATE AND CALCIUM CHLORIDE 1000 ML: 600; 310; 30; 20 INJECTION, SOLUTION INTRAVENOUS at 13:00

## 2023-05-08 RX ADMIN — DEXMEDETOMIDINE HYDROCHLORIDE 4 MCG: 100 INJECTION, SOLUTION INTRAVENOUS at 14:35

## 2023-05-08 RX ADMIN — HYDROMORPHONE HYDROCHLORIDE 1 MG: 1 INJECTION, SOLUTION INTRAMUSCULAR; INTRAVENOUS; SUBCUTANEOUS at 18:03

## 2023-05-08 RX ADMIN — Medication 10 ML: at 13:00

## 2023-05-08 RX ADMIN — SODIUM CHLORIDE, POTASSIUM CHLORIDE, SODIUM LACTATE AND CALCIUM CHLORIDE 150 ML/HR: 600; 310; 30; 20 INJECTION, SOLUTION INTRAVENOUS at 23:57

## 2023-05-08 RX ADMIN — FENTANYL CITRATE 50 MCG: 50 INJECTION, SOLUTION INTRAMUSCULAR; INTRAVENOUS at 15:56

## 2023-05-08 RX ADMIN — Medication 0.5 MG: at 15:34

## 2023-05-08 RX ADMIN — Medication 0.5 MG: at 15:53

## 2023-05-08 RX ADMIN — BUPIVACAINE HYDROCHLORIDE 60 ML: 2.5 INJECTION, SOLUTION EPIDURAL; INFILTRATION; INTRACAUDAL; PERINEURAL at 13:45

## 2023-05-08 RX ADMIN — DEXMEDETOMIDINE HYDROCHLORIDE 4 MCG: 100 INJECTION, SOLUTION INTRAVENOUS at 14:12

## 2023-05-08 RX ADMIN — ESCITALOPRAM OXALATE 10 MG: 10 TABLET ORAL at 08:32

## 2023-05-08 RX ADMIN — SODIUM CHLORIDE, POTASSIUM CHLORIDE, SODIUM LACTATE AND CALCIUM CHLORIDE 150 ML/HR: 600; 310; 30; 20 INJECTION, SOLUTION INTRAVENOUS at 16:38

## 2023-05-08 RX ADMIN — CEFAZOLIN 3 G: 10 INJECTION, POWDER, FOR SOLUTION INTRAVENOUS at 20:11

## 2023-05-08 RX ADMIN — FENTANYL CITRATE 50 MCG: 50 INJECTION, SOLUTION INTRAMUSCULAR; INTRAVENOUS at 15:31

## 2023-05-08 RX ADMIN — HYDROMORPHONE HYDROCHLORIDE 1 MG: 1 INJECTION, SOLUTION INTRAMUSCULAR; INTRAVENOUS; SUBCUTANEOUS at 22:25

## 2023-05-08 RX ADMIN — SUGAMMADEX 300 MG: 100 INJECTION, SOLUTION INTRAVENOUS at 14:56

## 2023-05-08 RX ADMIN — FENTANYL CITRATE 100 MCG: 50 INJECTION, SOLUTION INTRAMUSCULAR; INTRAVENOUS at 13:42

## 2023-05-08 RX ADMIN — TOPIRAMATE 25 MG: 25 TABLET, FILM COATED ORAL at 08:32

## 2023-05-08 RX ADMIN — ROCURONIUM BROMIDE 100 MG: 10 SOLUTION INTRAVENOUS at 13:42

## 2023-05-08 RX ADMIN — ENOXAPARIN SODIUM 40 MG: 40 INJECTION SUBCUTANEOUS at 08:32

## 2023-05-08 RX ADMIN — PROPOFOL 25 MCG/KG/MIN: 10 INJECTION, EMULSION INTRAVENOUS at 14:16

## 2023-05-08 RX ADMIN — SODIUM CHLORIDE 125 ML/HR: 9 INJECTION, SOLUTION INTRAVENOUS at 08:32

## 2023-05-08 RX ADMIN — DEXMEDETOMIDINE HYDROCHLORIDE 4 MCG: 100 INJECTION, SOLUTION INTRAVENOUS at 14:06

## 2023-05-08 RX ADMIN — GABAPENTIN 100 MG: 100 CAPSULE ORAL at 18:03

## 2023-05-08 RX ADMIN — DROPERIDOL 0.62 MG: 2.5 INJECTION, SOLUTION INTRAMUSCULAR; INTRAVENOUS at 15:43

## 2023-05-08 RX ADMIN — DEXMEDETOMIDINE HYDROCHLORIDE 4 MCG: 100 INJECTION, SOLUTION INTRAVENOUS at 14:09

## 2023-05-08 RX ADMIN — HYDROMORPHONE HYDROCHLORIDE 0.5 MG: 1 INJECTION, SOLUTION INTRAMUSCULAR; INTRAVENOUS; SUBCUTANEOUS at 15:53

## 2023-05-08 RX ADMIN — ONDANSETRON 4 MG: 2 INJECTION INTRAMUSCULAR; INTRAVENOUS at 14:43

## 2023-05-08 RX ADMIN — DEXAMETHASONE SODIUM PHOSPHATE 4 MG: 4 INJECTION, SOLUTION INTRAMUSCULAR; INTRAVENOUS at 13:46

## 2023-05-08 RX ADMIN — Medication 10 ML: at 08:33

## 2023-05-08 RX ADMIN — LIDOCAINE HYDROCHLORIDE 50 MG: 10 INJECTION, SOLUTION EPIDURAL; INFILTRATION; INTRACAUDAL; PERINEURAL at 13:42

## 2023-05-08 RX ADMIN — DEXAMETHASONE SODIUM PHOSPHATE 4 MG: 10 INJECTION, SOLUTION INTRAMUSCULAR; INTRAVENOUS at 13:45

## 2023-05-08 NOTE — ANESTHESIA PREPROCEDURE EVALUATION
Anesthesia Evaluation     Patient summary reviewed and Nursing notes reviewed   no history of anesthetic complications:  NPO Solid Status: > 8 hours  NPO Liquid Status: > 2 hours           Airway   Mallampati: II  TM distance: >3 FB  Neck ROM: full  No difficulty expected  Dental - normal exam     Pulmonary - normal exam    breath sounds clear to auscultation  (+) a smoker (quit x 1 yr) Former,   Cardiovascular - negative cardio ROS and normal exam    ECG reviewed  Rhythm: regular  Rate: normal      ROS comment: 8/22 Echo:  ? Normal left ventricular cavity size and wall thickness  ? Left ventricular ejection fraction appears to be 56 - 60%.  ? Left ventricular diastolic function was normal.  ? The left atrial cavity is mildly dilated. 4.2 Cm  ? No aortic valve regurgitation or stenosis is present.  ? Trace to mild mitral valve regurgitation  ? Physiologic tricuspid valve regurgitation      Neuro/Psych- negative ROS  GI/Hepatic/Renal/Endo    (+) morbid obesity (s/p LSG 5/4/23), hiatal hernia (Recurrent), GERD,      Musculoskeletal     Abdominal    Substance History      OB/GYN          Other - negative ROS                       Anesthesia Plan    ASA 3     general with block     (Bilateral TAP blocks post-induction for post-operative analgesia per request of Dr. Canada)  intravenous induction     Anesthetic plan, risks, benefits, and alternatives have been provided, discussed and informed consent has been obtained with: patient.    Plan discussed with CRNA.        CODE STATUS:    Code Status (Patient has no pulse and is not breathing): CPR (Attempt to Resuscitate)  Medical Interventions (Patient has pulse or is breathing): Full

## 2023-05-08 NOTE — ANESTHESIA POSTPROCEDURE EVALUATION
Patient: Beverly Giron    Procedure Summary     Date: 05/08/23 Room / Location:  [facility] / [facility]    Anesthesia Start: 1335 Anesthesia Stop: 1519    Procedure: EARLY RECURRENT PARAESOPHAGEAL HERNIA REPAIR LAPAROSCOPIC (Abdomen) Diagnosis:       Paraesophageal hiatal hernia      History of repair of hiatal hernia      (Early recurrent paraesophageal hernia)    Surgeons: Jovanny Canada MD Provider: Paul Mcdonald MD    Anesthesia Type: general with block ASA Status: 3          Anesthesia Type: general with block    Vitals  Vitals Value Taken Time   /95 05/08/23 1518   Temp 97 °F (36.1 °C) 05/08/23 1518   Pulse 70 05/08/23 1518   Resp 14 05/08/23 1518   SpO2 92 % 05/08/23 1518           Post Anesthesia Care and Evaluation    Patient location during evaluation: PACU  Patient participation: complete - patient participated  Level of consciousness: awake and alert  Pain score: 0  Pain management: adequate    Airway patency: patent  Anesthetic complications: No anesthetic complications  PONV Status: none  Cardiovascular status: hemodynamically stable and acceptable  Respiratory status: nonlabored ventilation, acceptable and nasal cannula  Hydration status: acceptable    Comments: Pt arrived to PACU with no issues during transport. Pt placed directly to monitors. Vital signs are within parameters. Pt maintaining ventilation spontaneously. No changes to dental. Report given to PACU and all question and concerns were addressed as well as the plan of care.

## 2023-05-08 NOTE — OP NOTE
Preoperative Diagnosis:                                  Early recurrent paraesophageal hernia postoperative day #4 laparoscopic sleeve gastrectomy and hiatal hernia repair     Postoperative Diagnosis:                                Same     Procedure:   Urgent early recurrent paraesophageal hernia repair laparoscopic with falciform ligament wrap                                                     Surgeon:                                                       TUNA Canada MD    Anesthesia:                                                   GETA     EBL:                                                              Min     Fluids:                                                           Crystalloid     Specimens:                                                   None     Drains:                                                           None     Counts:                                                          Correct     Complications:                                               None    Findings:  The lateral third of the sleeve had herniated into the chest.  Previous posterior suture material intact and further reinforced.  In addition reinforced with the falciform ligament wrap.  After reduction the sleeve appeared edematous and somewhat tortuous and was sutured to the surrounding omentum.  Some reactive ascites noted, no peritonitis.  No other abnormalities noted.     Indications:   This is a 38 year-old morbidly obese female postoperative day #4 uneventful laparoscopic sleeve gastrectomy and hiatal hernia repair.  The next morning her upper GI was unremarkable other than some moderate luminal narrowing of the proximal sleeve which was felt to likely represent postoperative edema.  Normal emptying and no recurrent hiatal hernia noted.  She was discharged home on the afternoon of postop day #1.  She says prior to discharge she had an episode of vomiting after taking some pills and since then has had intractable  nausea and vomiting.  She was readmitted 2 days later, CT scan showed changes of sleeve gastrectomy and a fluid-filled esophagus felt to possibly reflect spasm at the GE junction.  Ultrasound of the gallbladder was unremarkable.  Repeat upper GI shows new onset paraesophageal hernia without obstruction which is a new finding not seen on upper GI on postoperative day #1.  Although her nausea and vomiting have improved since admission she continues to be symptomatic.  Risks, benefits, and alternative therapies were discussed including continued conservative treatment and she wishes to proceed with urgent laparoscopic early recurrent paraesophageal hernia repair.     Operative technique:      The patient was brought to the operating room, and placed supine upon the operating room table.  SCD hose were placed, she underwent uneventful general endotracheal anesthesia per the anesthesiology staff, they noted some yellow emesis, she received IV Ancef, had received subcutaneous Lovenox earlier this morning, the anesthesiology staff performed a tap block, and her abdomen was prepped and draped with ChloraPrep in a sterile fashion, an Ioban was used as well, a Cho catheter was not placed.    Recent incisions utilized.  The peritoneal cavity was entered in the recent entrance trocar site in the upper abdomen to the left of midline using an 11 mm fascial splitting trocar utilizing an Optiview technique and the abdomen was insufflated to a pressure of 15 mmHg with CO2 gas.  Exploratory laparoscopy revealed no evidence of injury from the entrance technique, no peritonitis, some ascites noted, normal-appearing fundus of the gallbladder.    Remaining trocars were placed under direct visualization including an additional 11 mm fascial splitting trocar in the old mid epigastric trocar site and 5 mm trocars in the right and left previous 5 mm trocar site incisions.    Through the previous epigastric incision for the Stormy  retractor, the Stormy retractor was again replaced and used  to elevate the left lobe of the liver.  No adhesions to the undersurface of the liver noted.    Immediately noted was the lateral third of the sleeve herniating into the mediastinum.  Several photos obtained.  The medial proximal sleeve was intact.  The sleeve was reduced manually.  Previous posterior suture material intact.  Once again noted the preserve replaced hepatic vessel.    Further mediastinal dissection was carried out even higher with the Enseal device once again taking care to avoid injury to the vagus nerves.  There appeared to be 3 cm of intra-abdominal esophagus which rested nicely and did not want to retract back into the mediastinum.  A latex free drain was used temporarily for esophageal retraction.  With the paraesophageal hernia completely reduced the posterior crura were further tightened using an additional running nonabsorbable 2-0 V-loc suture, photodocumentation obtained before and after repair.     The falciform ligament was amputated at its base and mobilized up to the level of the liver.  It was then passed in front of the crural repair and behind the esophagus where it was noted to wrap back to itself in a 360 degree fashion tension.  The falciform was sutured to the angle of Hiss and working medially along the GE junction and then back to itself above the hepatic branch of the vagus nerve using a running absorbable 2-0 V-loc suture.  Again photodocumentation obtained.    The sleeve appeared edematous and somewhat tortuous and was therefore sutured to the omentum using running absorbable 3-0 stratafix sutures beginning at the apex of the staple line and suturing down to the mid antrum.  Upon completion the sleeve appeared to rest nicely and again photodocumentation obtained.    All trocars were removed under direct visualization, no bleeding noted from their sites.  Skin in each incision was closed using 3-0 Monocryl plus in  an interrupted subcuticular stitch followed by skin glue.    The patient tolerated the procedure well without complication was taken to recovery in stable condition.

## 2023-05-08 NOTE — ANESTHESIA PROCEDURE NOTES
Airway  Urgency: elective    Date/Time: 5/8/2023 1:43 PM  Airway not difficult    General Information and Staff    Patient location during procedure: OR  CRNA/CAA: Julio Bello CRNA    Indications and Patient Condition  Indications for airway management: airway protection    Preoxygenated: yes  MILS not maintained throughout  Mask difficulty assessment: 1 - vent by mask    Final Airway Details  Final airway type: endotracheal airway      Successful airway: ETT  Cuffed: yes   Successful intubation technique: video laryngoscopy  Endotracheal tube insertion site: oral  Blade: Ariza  Blade size: 3  ETT size (mm): 7.0  Cormack-Lehane Classification: grade I - full view of glottis  Placement verified by: chest auscultation and capnometry   Cuff volume (mL): 10  Measured from: lips  ETT/EBT  to lips (cm): 22  Number of attempts at approach: 1  Assessment: lips, teeth, and gum same as pre-op and atraumatic intubation    Additional Comments  Negative epigastric sounds, Breath sound equal bilaterally with symmetric chest rise and fall

## 2023-05-08 NOTE — PLAN OF CARE
Goal Outcome Evaluation:  Plan of Care Reviewed With: patient        Progress: improving  Outcome Evaluation: VSS on room air, no c/o nausea, prn dilaudid given x1 dose for abd pain, NPO. Up ad nikolas, using incentive spirometer. Pt appears to be sleeping at this time.

## 2023-05-08 NOTE — PLAN OF CARE
Goal Outcome Evaluation:  Plan of Care Reviewed With: patient        Progress: improving  Outcome Evaluation: VSS, RA. Gallbladder US and upper GI completed. IV pain medication given per pt request. NPO. Currently in surgery.

## 2023-05-08 NOTE — CASE MANAGEMENT/SOCIAL WORK
Continued Stay Note  [facility]     Patient Name: Beverly Giron  MRN: [id number]  Today's Date: 5/8/2023    Admit Date: 5/6/2023    Plan: CM update   Discharge Plan     Row Name 05/08/23 1322       Plan    Plan CM update    Plan Comments Patient is not yet ready for discharge - to the OR today for recurrent hiatal hernia/paraesophageal hernia repair. Goal remains to return home with her spouse upon discharge. CM will continue to follow - acacia [phone]    Final Discharge Disposition Code 01 - home or self-care               Discharge Codes    No documentation.                     Acacia Tirado RN

## 2023-05-08 NOTE — ANESTHESIA PROCEDURE NOTES
"Peripheral Block      Patient reassessed immediately prior to procedure    Patient location during procedure: OR  Start time: 5/8/2023 1:45 PM  Stop time: 5/8/2023 1:50 PM  Reason for block: at surgeon's request and post-op pain management  Performed by  Anesthesiologist: Paul Mcdonald MD  Preanesthetic Checklist  Completed: patient identified, IV checked, site marked, risks and benefits discussed, surgical consent, monitors and equipment checked, pre-op evaluation and timeout performed  Prep:  Pt Position: supine  Sterile barriers:cap, gloves, mask and washed/disinfected hands  Prep: ChloraPrep  Patient monitoring: blood pressure monitoring, continuous pulse oximetry and EKG  Procedure    Sedation: yes  Performed under: general  Guidance:ultrasound guided  Images:still images obtained, printed/placed on chart    Laterality:Bilateral  Block Type:TAP  Injection Technique:single-shot  Needle Type:short-bevel and echogenic  Needle Gauge:20 G  Resistance on Injection: none    Medications Used: dexamethasone sodium phosphate injection - Injection   4 mg - 5/8/2023 1:45:00 PM  bupivacaine PF (MARCAINE) 0.25 % injection - Injection   60 mL - 5/8/2023 1:45:00 PM      Medications  Comment:Block Injection:  LA dose divided between Right and Left block        Post Assessment  Injection Assessment: negative aspiration for heme, incremental injection and no paresthesia on injection  Patient Tolerance:comfortable throughout block  Complications:no  Additional Notes    Subcostal TAPs    A high-frequency linear transducer, with sterile cover, was placed sub-xiphoid to identify Linea Alba, right and left Rectus Abdominus Muscles (RAYNA). The transducer was moved either right or left subcostally to identify the RAYNA and the Transverse Abdominus Muscle (VALENCIA). The insertion site was prepped in sterile fashion and then localized with 2-5 ml of 1% Lidocaine. Using ultrasound-guidance, a 20-gauge B-Malone 4\" Ultraplex 360 non-stimulating " "echogenic needle was advanced in plane, from medial to lateral, until the tip of the needle was in the fascial plane between the RAYNA and VALENCIA. 1-3ml of preservative free normal saline was used to hydro-dissect the fascial planes. After the fascial plane was verified, the local anesthetic (LA) was injected. The procedure was repeated on the opposite side for bilateral coverage. Aspiration every 5 ml to prevent intravascular injection. Injection was completed with negative aspiration of blood and negative intravascular injection. Injection pressures were normal with minimal resistance. The subcostal approach to the TAP nerve block ideally anesthetizes the intercostal nerves T6-T9.     Mid-Axillary/Lateral TAPs    A high-frequency linear transducer, with sterile cover, was placed in the midaxillary line between the ASIS and costal margin. The External Oblique Muscle (EOM), Internal Oblique Muscle (IOM), Transverse Abdominus Muscle (VALENCIA), and Peritoneum were identified. The insertion site was prepped in sterile fashion and then localized with 2-5 ml of 1% Lidocaine. Using ultrasound-guidance, a 20-gauge B-Malone 4\" Ultraplex 360 non-stimulating echogenic needle was advanced in plane, from medial to lateral, until the tip of the needle was in the fascial plane between the IOM and VALENCIA. 1-3ml of preservative free normal saline was used to hydro-dissect the fascial planes. After the fascial plane was verified, the local anesthetic (LA) was injected. The procedure was repeated on the opposite side for bilateral coverage. Aspiration every 5 ml to prevent intravascular injection. Injection was completed with negative aspiration of blood and negative intravascular injection. Injection pressures were normal with minimal resistance. Midaxillary TAPs should reach intercostal nerves T10- T11 and the subcostal nerve T12.            "

## 2023-05-08 NOTE — PROGRESS NOTES
"Cc:  POD#4 LSG/HHR  \"feel ok\"    She is sitting up in bed alone in the room.  No nausea currently.  Keeping down sips.  Denies pain but says it is getting IV pain medication as needed.  Flatus yesterday, no bowel movement since surgery.  On questioning she says just prior to discharge from the hospital after taking a couple of pills she had an episode of vomiting.  After getting home her vomiting and nausea persisted and were intractable.    No fever or tachycardia pulse 66 respirations 18 blood pressure 122/78.  She is no apparent distress.  Abdomen is soft and benign, wounds healing without evidence of infection.    CMP normal except for glucose of 64 chloride 110 CO2 20 calcium 7.9 abdomen 3.4.  CRP is elevated 4.45.  White count normal at 8000 with a normal differential H&H 12.3 and 38.7.  Once again on admission iron was low at 22 and prealbumin low at 15.7.  Upper GI images reviewed there is a new type III paraesophageal hernia versus a proximal looping of the sleeve which is a change from previous upper GI 3 days ago, the radiology PA contacted us regarding this, report pending    Impression: Postoperative day #4  laparoscopic sleeve gastrectomy and hiatal hernia repair with new onset nausea and vomiting.  Upper GI shows an early recurrent type III paraesophageal hernia.  She apparently had an episode of nausea and vomiting just prior to discharge on postoperative day #1 as above    Plan: Discussed risks, benefits, and alternative therapies with the patient including trying conservative management versus early return to the OR for recurrent hiatal hernia/paraesophageal hernia repair, and she prefers the latter.  "

## 2023-05-08 NOTE — BRIEF OP NOTE
PARAESOPHAGEAL HERNIA REPAIR LAPAROSCOPIC  Progress Note    Beverly Giron  5/8/2023    Pre-op Diagnosis:   Early recurrent paraesophageal hernia       Post-Op Diagnosis Codes:     * Paraesophageal hiatal hernia [K44.9]     * History of repair of hiatal hernia [Z98.890, Z87.19]    Procedure/CPT® Codes:  ND LAPS RPR PARAESPHGL HRNA INCL FUNDPLSTY W/O MESH [37124]      Procedure(s):  EARLY RECURRENT PARAESOPHAGEAL HERNIA REPAIR LAPAROSCOPIC WITH FALCIFORM LIGAMENT WRAP        Surgeon(s):  Jovanny Canada MD    Anesthesia: General with Block    Staff:   Circulator: Damaris Tejeda RN  Scrub Person: Kemar Moreland  Nursing Assistant: Jacklyn Mcallister CNA         Estimated Blood Loss: minimal    Urine Voided: * No values recorded between 5/8/2023  1:35 PM and 5/8/2023  2:56 PM *    Specimens:                None          Drains: * No LDAs found *    Findings:   The lateral third of the sleeve had herniated into the chest.  Previous posterior suture material intact and further reinforced.  In addition reinforced with the falciform ligament wrap.  After reduction the sleeve appeared edematous and somewhat tortuous and was sutured to the surrounding omentum.  Some reactive ascites noted, no peritonitis.  No other abnormalities noted.        Complications:  None          Jovanny Canada MD     Date: 5/8/2023  Time: 15:00 EDT

## 2023-05-09 ENCOUNTER — APPOINTMENT (OUTPATIENT)
Dept: GENERAL RADIOLOGY | Facility: HOSPITAL | Age: 38
End: 2023-05-09
Payer: MEDICAID

## 2023-05-09 VITALS
RESPIRATION RATE: 18 BRPM | WEIGHT: 270.4 LBS | SYSTOLIC BLOOD PRESSURE: 128 MMHG | HEIGHT: 64 IN | OXYGEN SATURATION: 98 % | BODY MASS INDEX: 46.16 KG/M2 | HEART RATE: 81 BPM | TEMPERATURE: 98.9 F | DIASTOLIC BLOOD PRESSURE: 83 MMHG

## 2023-05-09 LAB
ALBUMIN SERPL-MCNC: 3.5 G/DL (ref 3.5–5.2)
ALBUMIN/GLOB SERPL: 1.6 G/DL
ALP SERPL-CCNC: 42 U/L (ref 39–117)
ALT SERPL W P-5'-P-CCNC: 12 U/L (ref 1–33)
ANION GAP SERPL CALCULATED.3IONS-SCNC: 13 MMOL/L (ref 5–15)
AST SERPL-CCNC: 17 U/L (ref 1–32)
BASOPHILS # BLD AUTO: 0.01 10*3/MM3 (ref 0–0.2)
BASOPHILS NFR BLD AUTO: 0.1 % (ref 0–1.5)
BILIRUB SERPL-MCNC: 0.4 MG/DL (ref 0–1.2)
BUN SERPL-MCNC: 5 MG/DL (ref 6–20)
BUN/CREAT SERPL: 7.2 (ref 7–25)
CALCIUM SPEC-SCNC: 7.9 MG/DL (ref 8.6–10.5)
CHLORIDE SERPL-SCNC: 107 MMOL/L (ref 98–107)
CO2 SERPL-SCNC: 18 MMOL/L (ref 22–29)
CREAT SERPL-MCNC: 0.69 MG/DL (ref 0.57–1)
DEPRECATED RDW RBC AUTO: 44.7 FL (ref 37–54)
EGFRCR SERPLBLD CKD-EPI 2021: 115.5 ML/MIN/1.73
EOSINOPHIL # BLD AUTO: 0.01 10*3/MM3 (ref 0–0.4)
EOSINOPHIL NFR BLD AUTO: 0.1 % (ref 0.3–6.2)
ERYTHROCYTE [DISTWIDTH] IN BLOOD BY AUTOMATED COUNT: 13.6 % (ref 12.3–15.4)
GLOBULIN UR ELPH-MCNC: 2.2 GM/DL
GLUCOSE SERPL-MCNC: 92 MG/DL (ref 65–99)
HCT VFR BLD AUTO: 36.9 % (ref 34–46.6)
HGB BLD-MCNC: 12.1 G/DL (ref 12–15.9)
IMM GRANULOCYTES # BLD AUTO: 0.03 10*3/MM3 (ref 0–0.05)
IMM GRANULOCYTES NFR BLD AUTO: 0.3 % (ref 0–0.5)
IRON 24H UR-MRATE: 21 MCG/DL (ref 37–145)
LYMPHOCYTES # BLD AUTO: 1.23 10*3/MM3 (ref 0.7–3.1)
LYMPHOCYTES NFR BLD AUTO: 13 % (ref 19.6–45.3)
MCH RBC QN AUTO: 29.4 PG (ref 26.6–33)
MCHC RBC AUTO-ENTMCNC: 32.8 G/DL (ref 31.5–35.7)
MCV RBC AUTO: 89.8 FL (ref 79–97)
MONOCYTES # BLD AUTO: 0.45 10*3/MM3 (ref 0.1–0.9)
MONOCYTES NFR BLD AUTO: 4.7 % (ref 5–12)
NEUTROPHILS NFR BLD AUTO: 7.76 10*3/MM3 (ref 1.7–7)
NEUTROPHILS NFR BLD AUTO: 81.8 % (ref 42.7–76)
NRBC BLD AUTO-RTO: 0 /100 WBC (ref 0–0.2)
PLATELET # BLD AUTO: 286 10*3/MM3 (ref 140–450)
PMV BLD AUTO: 9.7 FL (ref 6–12)
POTASSIUM SERPL-SCNC: 4.3 MMOL/L (ref 3.5–5.2)
PROT SERPL-MCNC: 5.7 G/DL (ref 6–8.5)
QT INTERVAL: 448 MS
QTC INTERVAL: 465 MS
RBC # BLD AUTO: 4.11 10*6/MM3 (ref 3.77–5.28)
SODIUM SERPL-SCNC: 138 MMOL/L (ref 136–145)
WBC NRBC COR # BLD: 9.49 10*3/MM3 (ref 3.4–10.8)

## 2023-05-09 PROCEDURE — 25010000002 ENOXAPARIN PER 10 MG: Performed by: SURGERY

## 2023-05-09 PROCEDURE — 0 DIATRIZOATE MEGLUMINE & SODIUM PER 1 ML: Performed by: SURGERY

## 2023-05-09 PROCEDURE — 85025 COMPLETE CBC W/AUTO DIFF WBC: CPT | Performed by: SURGERY

## 2023-05-09 PROCEDURE — 25010000002 HYDROMORPHONE 1 MG/ML SOLUTION: Performed by: SURGERY

## 2023-05-09 PROCEDURE — 25010000002 CEFAZOLIN PER 500 MG: Performed by: SURGERY

## 2023-05-09 PROCEDURE — G0378 HOSPITAL OBSERVATION PER HR: HCPCS

## 2023-05-09 PROCEDURE — 99024 POSTOP FOLLOW-UP VISIT: CPT | Performed by: SURGERY

## 2023-05-09 PROCEDURE — 74240 X-RAY XM UPR GI TRC 1CNTRST: CPT

## 2023-05-09 PROCEDURE — 80053 COMPREHEN METABOLIC PANEL: CPT | Performed by: SURGERY

## 2023-05-09 PROCEDURE — 83540 ASSAY OF IRON: CPT | Performed by: SURGERY

## 2023-05-09 RX ORDER — OXYCODONE HYDROCHLORIDE 5 MG/1
5 TABLET ORAL EVERY 4 HOURS PRN
Qty: 10 TABLET | Refills: 0 | Status: SHIPPED | OUTPATIENT
Start: 2023-05-09

## 2023-05-09 RX ADMIN — GABAPENTIN 100 MG: 100 CAPSULE ORAL at 08:31

## 2023-05-09 RX ADMIN — PANTOPRAZOLE SODIUM 40 MG: 40 INJECTION, POWDER, LYOPHILIZED, FOR SOLUTION INTRAVENOUS at 05:42

## 2023-05-09 RX ADMIN — ESCITALOPRAM OXALATE 10 MG: 10 TABLET ORAL at 08:31

## 2023-05-09 RX ADMIN — ACETAMINOPHEN 1000 MG: 650 SOLUTION ORAL at 05:42

## 2023-05-09 RX ADMIN — SODIUM CHLORIDE, POTASSIUM CHLORIDE, SODIUM LACTATE AND CALCIUM CHLORIDE 150 ML/HR: 600; 310; 30; 20 INJECTION, SOLUTION INTRAVENOUS at 06:51

## 2023-05-09 RX ADMIN — HYDROMORPHONE HYDROCHLORIDE 1 MG: 1 INJECTION, SOLUTION INTRAMUSCULAR; INTRAVENOUS; SUBCUTANEOUS at 04:07

## 2023-05-09 RX ADMIN — ENOXAPARIN SODIUM 40 MG: 100 INJECTION SUBCUTANEOUS at 08:30

## 2023-05-09 RX ADMIN — CEFAZOLIN 3 G: 10 INJECTION, POWDER, FOR SOLUTION INTRAVENOUS at 05:42

## 2023-05-09 RX ADMIN — OXYCODONE HYDROCHLORIDE 5 MG: 5 TABLET ORAL at 08:31

## 2023-05-09 RX ADMIN — CETIRIZINE HYDROCHLORIDE 10 MG: 10 TABLET, FILM COATED ORAL at 08:31

## 2023-05-09 NOTE — PLAN OF CARE
Goal Outcome Evaluation:  Plan of Care Reviewed With: patient        Progress: improving  Outcome Evaluation: VSS on RA. Complaints of pain relieved with PRN meds. No complaints of nausea. Tolerating protein. DC home with family.

## 2023-05-09 NOTE — CASE MANAGEMENT/SOCIAL WORK
Continued Stay Note  [facility]     Patient Name: Beverly Giron  MRN: [id number]  Today's Date: 5/9/2023    Admit Date: 5/6/2023    Plan:  update   Discharge Plan     Row Name 05/09/23 1507       Plan    Final Discharge Disposition Code 01 - home or self-care               Discharge Codes    No documentation.               Expected Discharge Date and Time     Expected Discharge Date Expected Discharge Time    May 9, 2023             Ange Tirado RN

## 2023-05-09 NOTE — PLAN OF CARE
Goal Outcome Evaluation:  Plan of Care Reviewed With: patient        Progress: improving  Outcome Evaluation: VSS on room air, prn meds given for c/o upper abd pain, no c/o nausea, tolerating sips/chips. Using IS/flutter valve every hour, pt has walked multiple laps in the hallway, urinating without difficulty, IV fluids and IV abx infusing per orders. Allergic reaction yesterday to dose of morphine given, pt had multiple areas of red blotches and c/o itchiness, 25mg benadryl given and pt verbalized relief of itching and red spots have since gone away, medication added to pt allergy list. Pt resting in bed at this time.

## 2023-05-09 NOTE — PROGRESS NOTES
"Cc: POD#1 early recurrent paraHHR, POD#5 LSG/HHR  \"feel better\"    She is sitting up in bed alone in the room.  Looks and feels better would like to go home.  Tolerating ice chips without nausea or vomiting has not received a tray yet.  Ambulating and voiding well.  No pulmonary complaints, spirometer over 2000 observed.  Passing some flatus, no bowel movement    No fever or tachycardia pulse 70 respirations 18 blood pressure 128/83 room air sat 100% she is in no apparent distress abdomen is soft, nontender/appropriate, nondistended, bowel sounds present.  Wounds look okay    CMP normal except for BUN of 5 CO2 18 calcium 7.9 total protein 5.7.  Iron is 21.  White count 9.5 with 82 segs 13 lymphs 5 monocytes no bands H&H 12 and 37 upper GI images reviewed, no leak or obstruction or recurrent hernia, report pending    Impression: Postop day #1 early recurrent paraesophageal hiatal hernia repair, postop day #5 laparoscopic sleeve gastrectomy and hiatal hernia repair.  Clinically seems to be doing well with resolution of preoperative symptoms and unremarkable upper GI on my review.    Plan: Discharge home if tolerates her protein shake.  Follow-up in approximately 1 week and call in the meantime with any problems questions or concerns.  See orders.  "

## 2023-05-10 PROBLEM — K44.0 HIATAL HERNIA WITH OBSTRUCTION BUT NO GANGRENE: Status: ACTIVE | Noted: 2023-05-10

## 2023-05-10 NOTE — DISCHARGE SUMMARY
Date of admission:    5/7/2023    Date of discharge:     5/9/2023    Admission Diagnosis:   Early recurrent paraesophageal hernia with partial gastric outlet obstruction and intractable nausea and vomiting status post laparoscopic sleeve gastrectomy and hiatal hernia repair 5/4/2023    Discharge Diagnosis:  Same    Principal Procedure Performed:   Urgent early recurrent paraesophageal hernia repair laparoscopic with falciform ligament wrap 5/8/2023    Other procedures performed:   CT scan of the abdomen and pelvis 5/6/2023, ultrasound of the gallbladder 5/8/2023, upper GI 5/8/2023, upper GI 5/9/2023    Complications: None    Consultations: None    History of present illness:  This is a 38-year-old morbidly obese female who underwent elective laparoscopic sleeve gastrectomy and hiatal hernia repair on 5/4/2023.  Her upper GI the following day was unremarkable and she was doing well that afternoon and was discharged home.  She says just prior to discharge she was given a couple pills and vomited.  After discharge she complained of intractable nausea and vomiting and returned to the ER where CT scan showed a fluid filled distended esophagus without visible obstruction or leak.      Hospital Course:   She was admitted, made n.p.o., and intravenous fluid hydration given.  Ultrasound of the gallbladder showed no gallstones.  Upper GI showed an early recurrent paraesophageal hernia without obstruction by verbal report.  At the time of discharge and at the time of this dictation formal report is not dictated for upper GI was performed on 5/8/2023 and 5/9/2023.  Her intractable nausea and vomiting had improved but she still had emesis and was only able to tolerate small amounts orally.  In discussion of the risks, benefits, and alternative therapies she wished to proceed with urgent return to the operating room for presumed early recurrent paraesophageal hernia.  She was therefore returned to the operating room with  laparoscopic findings of a paraesophageal hernia involving the lateral third of the sleeve which had herniated into the mediastinum.  Previous posterior sutures intact.  The sleeve was reduced, the hiatus reinforced posteriorly, a falciform ligament wrap performed.  The sleeve appeared edematous and somewhat tortuous having been herniated into the mediastinum and was sutured to the surrounding omentum.  Postoperatively she was transferred back to the bariatric care unit where she felt better clinically.  Upper GI repeated the next day and looked unremarkable although report pending as above.  She was tolerating ice chips without nausea or vomiting and otherwise doing well and plans were to send her home if she tolerated her protein shake.  She did receive IV iron for a level of 21.  Discharge H&H was 12 and 37.  CMP was normal except for BUN of 5 CO2 of 18 calcium of 7.9 total protein of 5.7.  She is therefore discharged home in good condition.  She is to follow-up in the office next week and call in the meantime with any problems questions or concerns.  Resume preoperative medications including her Eliquis.  Additional prescription for oxycodone given.  See orders.

## 2023-05-11 ENCOUNTER — OFFICE VISIT (OUTPATIENT)
Dept: BARIATRICS/WEIGHT MGMT | Facility: CLINIC | Age: 38
End: 2023-05-11
Payer: MEDICAID

## 2023-05-11 VITALS
HEART RATE: 85 BPM | SYSTOLIC BLOOD PRESSURE: 130 MMHG | TEMPERATURE: 97.7 F | OXYGEN SATURATION: 99 % | DIASTOLIC BLOOD PRESSURE: 76 MMHG | HEIGHT: 65 IN | WEIGHT: 263.5 LBS | BODY MASS INDEX: 43.9 KG/M2

## 2023-05-11 DIAGNOSIS — E66.01 OBESITY, CLASS III, BMI 40-49.9 (MORBID OBESITY): ICD-10-CM

## 2023-05-11 DIAGNOSIS — Z98.84 STATUS POST BARIATRIC SURGERY: Primary | ICD-10-CM

## 2023-05-11 RX ORDER — URSODIOL 300 MG/1
300 CAPSULE ORAL 2 TIMES DAILY
Qty: 60 CAPSULE | Refills: 5 | Status: SHIPPED | OUTPATIENT
Start: 2023-05-11 | End: 2023-06-10

## 2023-05-11 NOTE — PROGRESS NOTES
[facility] Bariatric Surgery  [address]  [address]  [facility] [address] [phone]      Patient Name:  Beverly Giron.  :  1985      Reason for Visit:  POD #7 LSG, POD#3 pHHR    HPI:  Beverly Giron is a 38 y.o. female s/p LSG/ HHR by  on 23, s/p urgent early recurrent paraesophageal hernia repair with falciform ligament wrap with Dr. Canada 23    Findings:  The lateral third of the sleeve had herniated into the chest.  Previous posterior suture material intact and further reinforced.  In addition reinforced with the falciform ligament wrap.  After reduction the sleeve appeared edematous and somewhat tortuous and was sutured to the surrounding omentum.  Some reactive ascites noted, no peritonitis.  No other abnormalities noted.    D/c POD#1 LSG- abnormal upper GI images and repor- no leak or obstruction they noted moderate luminal narrowing of the proximal portion of the sleeve which likely represents postoperative edema no delay in gastric emptying.intraoperative EGD no significant narrow area in the proximal sleeve.    She called the following day with inability to tolerate PO intake. Was readmitted and taken back to OR for recurrent pHHR. D/c POD#1 rec pHHR and advised f/u in 1 week.  GBUS also complete while admitted.     Doing well.  Presents today as scheduled for her original 1 week follow up. Relieved symptoms resolved and feeling really good. Chest pressure is resolving. No dysphagia, tolerating liquids well. Incisions are sore. Taking oxy prn.  Taking zofran prn, not really having nausea.   No other  Issues/concerns. Denies dysphagia, reflux, nausea, vomiting, abdominal pain, pulmonary issues and fevers. Using IS to 2500.   Tolerating diet progression - on stage 1.  Getting 65+g prot/day.  Drinking 64+ fluid oz/day.  Hasn't started vitamins yet.  On Omeprazole  and Eliquis .  Holding ASA , NSAIDs , Tramadol, Hormones, Diuretics ,  Steroids and Immunologics and tobacco use/ exposure.  Ambulating.     Presurgery weight: 280 pounds.  Today's weight is 120 kg (263 lb 8 oz) pounds, today's  Body mass index is 44.53 kg/m²., and@ weight loss since surgery is 17 pounds.       Final Diagnosis   STOMACH, SUBTOTAL GASTRECTOMY:     Gastric mucosa with mild chronic gastritis     Negative for dysplasia or carcinoma         Past Medical History:   Diagnosis Date   • Anxiety and depression    • Carpal tunnel syndrome    • Elevated LDL cholesterol level    • Former smoker    • Frequent headaches    • Genital herpes    • GERD (gastroesophageal reflux disease)    • Heart murmur    • Heartburn     Tums PRN; EGD Dr. Canada    • Hiatal hernia with gastroesophageal reflux     EGD Dr. Canada    • History of DVT of lower extremity     following partial hysterectomy- believed to be provoked. Following with heme/onc   • History of partial hysterectomy    • HPV (human papilloma virus) infection    • Hx of eye surgery    • Hx of ovarian cyst    • Low back pain     no steroids. PRN NSAIDs   • Migraines     PRN flexeril and nsaids   • Mitral regurgitation     Mild   •  (normal spontaneous vaginal delivery)     x 3, 1st two and 4th deliveries.  No complics   • Varicose veins of both lower extremities    • Venous insufficiency      Past Surgical History:   Procedure Laterality Date   •  SECTION N/A     x 3. 3rd and last 2 deliveries   • ECHO - CONVERTED  2022    EF 60%. Trace -Mild MR. LA- 4.2   • ENDOSCOPY N/A 2023    Procedure: ESOPHAGOGASTRODUODENOSCOPY;  Surgeon: Jovanny Canada MD;  Location:  [facility];  Service: Bariatric;  Laterality: N/A;  scope    • EYE SURGERY Bilateral    • GASTRECTOMY N/A 2023    Procedure: LAPAROSCOPIC GASTRECTOMY, HIATAL HERNIA REPAIR LAPAROSCOPIC;  Surgeon: Jovanny Canada MD;  Location:  [facility];  Service: Bariatric;  Laterality: N/A;  hiatal hernia time: 0430-2103   • LAPAROSCOPIC  TUBAL LIGATION     • PARAESOPHAGEAL HERNIA REPAIR N/A 5/8/2023    Procedure: EARLY RECURRENT PARAESOPHAGEAL HERNIA REPAIR LAPAROSCOPIC;  Surgeon: Jovanny Canada MD;  Location: [facility];  Service: General;  Laterality: N/A;   • SUBTOTAL HYSTERECTOMY  2021    laparoscopic; still has ovaries, complicated by left DVT   • US VENOUS EXTREMITY UNILATERAL  05/23/2022    Normal, no DVT   • US VENOUS EXTREMITY UNILATERAL  02/10/2022    Partially occlusive superficial thrombophlebitis greater saphenous vein   • US VENOUS EXTREMITY UNILATERAL  09/29/2021    Small amount residual thrombus greater saphenous vein   • US VENOUS EXTREMITY UNILATERAL  04/21/2021    Superficial thrombosis greater saphenous vein   • US VENOUS EXTREMITY UNILATERAL  03/10/2021    Occlusive extensive thrombus within the greater saphenous vein from upper calf throughout the thigh   • VEIN LIGATION AND STRIPPING Bilateral      Outpatient Medications Marked as Taking for the 5/11/23 encounter (Office Visit) with Candy Rizvi PA-C   Medication Sig Dispense Refill   • apixaban (ELIQUIS) 5 MG tablet tablet Take 1 tablet by mouth Daily. PT TO HOLD (3) DAYS PRIOR TO PROCEDURE PER DR. CANADA.     • CRANBERRY PO Take 525 mg by mouth Daily As Needed.     • cyclobenzaprine (FLEXERIL) 10 MG tablet Take 1 tablet by mouth 2 (Two) Times a Day As Needed for Muscle Spasms.     • docusate sodium (COLACE) 250 MG capsule Take 1 capsule by mouth Daily As Needed for Constipation.     • escitalopram (LEXAPRO) 10 MG tablet Take 1 tablet by mouth Daily.     • hydrOXYzine pamoate (VISTARIL) 25 MG capsule Take 1 capsule by mouth 3 (Three) Times a Day As Needed.     • loratadine (CLARITIN) 10 MG tablet Take 1 tablet by mouth Daily.     • metroNIDAZOLE (METROGEL) 1 % gel Apply  topically to the appropriate area as directed 2 (Two) Times a Day.     • multivitamin with minerals tablet tablet Take 1 tablet by mouth Daily.     • omeprazole (priLOSEC) 40 MG capsule Take 1  "capsule by mouth Daily for 60 days. 60 capsule 0   • ondansetron (Zofran) 4 MG tablet Take 1 tablet by mouth Every 4 (Four) Hours As Needed for Nausea. 10 tablet 0   • oxyCODONE (Roxicodone) 5 MG immediate release tablet Take 1 tablet by mouth Every 4 (Four) Hours As Needed for Moderate Pain. 10 tablet 0   • Probiotic Product (PROBIOTIC PO) Take  by mouth Daily.     • topiramate (TOPAMAX) 25 MG tablet Take 1 tablet by mouth Daily.     • tretinoin (RETIN-A) 0.025 % gel Apply  topically to the appropriate area as directed Every Night.     • Vitamin D, Cholecalciferol, 50 MCG (2000 UT) capsule Take  by mouth Daily.       Allergies   Allergen Reactions   • Morphine Hives and Itching       Social History     Socioeconomic History   • Marital status:    Tobacco Use   • Smoking status: Former     Packs/day: 1.00     Years: 10.00     Pack years: 10.00     Types: Cigarettes     Quit date: 2022     Years since quittin.1   • Smokeless tobacco: Never   • Tobacco comments:     she had stopped in  for a few years then restarted 10/2021. Has stopped again 2022.   Vaping Use   • Vaping Use: Never used   Substance and Sexual Activity   • Alcohol use: Yes     Comment: occasionally   • Drug use: Never   • Sexual activity: Defer       /76 (BP Location: Left arm, Patient Position: Sitting)   Pulse 85   Temp 97.7 °F (36.5 °C)   Ht 163.8 cm (64.5\")   Wt 120 kg (263 lb 8 oz)   SpO2 99%   BMI 44.53 kg/m²   Physical Exam  Constitutional:       Appearance: She is well-developed. She is obese.   HENT:      Head: Normocephalic and atraumatic.   Cardiovascular:      Rate and Rhythm: Normal rate and regular rhythm.   Pulmonary:      Effort: Pulmonary effort is normal.      Breath sounds: Normal breath sounds.   Abdominal:      General: Bowel sounds are normal.      Palpations: Abdomen is soft.      Comments: Incisions healing well   Skin:     General: Skin is warm and dry.   Neurological:      Mental Status: She " is alert.   Psychiatric:         Mood and Affect: Mood normal.         Behavior: Behavior normal.         Thought Content: Thought content normal.         Judgment: Judgment normal.           Assessment:  s/p LSG/ HHR by  on 5/4/23, s/p urgent early recurrent paraesophageal hernia repair with falciform ligament wrap with Dr. Canada 5/8/23      ICD-10-CM ICD-9-CM   1. Status post bariatric surgery  Z98.84 V45.86   2. Obesity, Class III, BMI 40-49.9 (morbid obesity)  E66.01 278.01           Plan:  Doing well. Continue to advance diet per slowly- mindful only POD#3 HHR.  Increase protein intake to 100g/day.  Increase exercise/activity as tolerated.  Reviewed lifting restrictions, nothing >25 lbs x 3 weeks postop.  Tart vitamins.start actigall. Cont Eliquis per heme.   Continue PPI.  Continue to avoid ASA/NSAIDs/tramadol/tobacco x 6 weeks postop, steroids x 8 weeks postop.  Call w/ problems/concerns.    Class 3 Severe Obesity (BMI >=40). Obesity-related health conditions include the following: as above. Obesity is improving with treatment. BMI is is above average; BMI management plan is completed. We discussed low calorie, low carb based diet program, portion control and increasing exercise.        The patient was instructed to follow up in 3 weeks, sooner if needed.

## 2023-05-30 LAB
QT INTERVAL: 448 MS
QTC INTERVAL: 465 MS

## 2023-06-05 ENCOUNTER — OFFICE VISIT (OUTPATIENT)
Dept: BARIATRICS/WEIGHT MGMT | Facility: CLINIC | Age: 38
End: 2023-06-05
Payer: MEDICAID

## 2023-06-05 VITALS
TEMPERATURE: 97.1 F | HEIGHT: 65 IN | HEART RATE: 95 BPM | SYSTOLIC BLOOD PRESSURE: 132 MMHG | DIASTOLIC BLOOD PRESSURE: 86 MMHG | OXYGEN SATURATION: 99 % | RESPIRATION RATE: 16 BRPM | WEIGHT: 243 LBS | BODY MASS INDEX: 40.48 KG/M2

## 2023-06-05 DIAGNOSIS — Z98.84 STATUS POST BARIATRIC SURGERY: Primary | ICD-10-CM

## 2023-06-05 DIAGNOSIS — Z13.21 MALNUTRITION SCREEN: ICD-10-CM

## 2023-06-05 DIAGNOSIS — R53.82 CHRONIC FATIGUE: ICD-10-CM

## 2023-06-05 DIAGNOSIS — Z90.3 POSTGASTRECTOMY MALABSORPTION: ICD-10-CM

## 2023-06-05 DIAGNOSIS — E66.01 OBESITY, CLASS III, BMI 40-49.9 (MORBID OBESITY): ICD-10-CM

## 2023-06-05 DIAGNOSIS — E55.9 HYPOVITAMINOSIS D: ICD-10-CM

## 2023-06-05 DIAGNOSIS — Z13.0 SCREENING, IRON DEFICIENCY ANEMIA: ICD-10-CM

## 2023-06-05 DIAGNOSIS — K91.2 POSTGASTRECTOMY MALABSORPTION: ICD-10-CM

## 2023-06-05 PROCEDURE — 1159F MED LIST DOCD IN RCRD: CPT | Performed by: PHYSICIAN ASSISTANT

## 2023-06-05 PROCEDURE — 99024 POSTOP FOLLOW-UP VISIT: CPT | Performed by: PHYSICIAN ASSISTANT

## 2023-06-05 PROCEDURE — 1160F RVW MEDS BY RX/DR IN RCRD: CPT | Performed by: PHYSICIAN ASSISTANT

## 2023-06-05 NOTE — PROGRESS NOTES
[facility] Bariatric Surgery  [address]  [address]  [facility] [id number] [phone]      Patient Name:  Beverly Giron.  :  1985      Reason for Visit:  1 month postop    HPI:  Beverly Giron is a 38 y.o. female  s/p LSG/ HHR by  on 23, s/p urgent early recurrent paraesophageal hernia repair with falciform ligament wrap with Dr. aCnada 23     Doing well. Pleased with progress so far.  Had a couple episodes of pain into R and L upper chest, transient lasting 5 minutes, no SOA/ dyspnea, LE edema/ redness/ pain, no cough or fever. Has intermittent hard stool and soft stool, has tried otc laxatives prn. No other issues/concerns. Denies dysphagia, reflux, nausea, vomiting, abdominal pain, pulmonary issues, and fevers.  Tolerating diet progression - on stage 4.  Getting 60-100g prot/day.  A lot of fish, salmon, tuna. Drinking 64+ fluid oz/day.  Taking MVI, B12, B1, Calcium, Vit D, iron, and Vit C.  On Omeprazole  and Actigall .  Still holding ASA , NSAIDs , Tramadol, Hormones, Diuretics , Steroids, and Immunologics.  Exercising- walking on incline in neighborhood.       Presurgery weight:  280 pounds. Today's weight is 110 kg (243 lb) pounds, today's Body mass index is 41.07 kg/m²., andHIS@ weight loss since surgery is 37 pounds.       Past Medical History:   Diagnosis Date    Anxiety and depression     Carpal tunnel syndrome     Elevated LDL cholesterol level     Former smoker     Frequent headaches     Genital herpes     GERD (gastroesophageal reflux disease)     Heart murmur     Heartburn     Tums PRN; EGD Dr. Canada     Hiatal hernia with gastroesophageal reflux     EGD Dr. Canada     History of DVT of lower extremity     following partial hysterectomy- believed to be provoked. Following with heme/onc    History of partial hysterectomy     HPV (human papilloma virus) infection     Hx of eye surgery     Hx of ovarian cyst     Low back pain      no steroids. PRN NSAIDs    Migraines     PRN flexeril and nsaids    Mitral regurgitation     Mild     (normal spontaneous vaginal delivery)     x 3, 1st two and 4th deliveries.  No complics    Varicose veins of both lower extremities     Venous insufficiency      Past Surgical History:   Procedure Laterality Date     SECTION N/A     x 3. 3rd and last 2 deliveries    ECHO - CONVERTED  2022    EF 60%. Trace -Mild MR. LA- 4.2    ENDOSCOPY N/A 2023    Procedure: ESOPHAGOGASTRODUODENOSCOPY;  Surgeon: Jovanny Canada MD;  Location:  [facility];  Service: Bariatric;  Laterality: N/A;  scope     EYE SURGERY Bilateral 1995    GASTRECTOMY N/A 2023    Procedure: LAPAROSCOPIC GASTRECTOMY, HIATAL HERNIA REPAIR LAPAROSCOPIC;  Surgeon: Jovanny Canada MD;  Location:  [facility];  Service: Bariatric;  Laterality: N/A;  hiatal hernia time: [id number]    LAPAROSCOPIC TUBAL LIGATION      PARAESOPHAGEAL HERNIA REPAIR N/A 2023    Procedure: EARLY RECURRENT PARAESOPHAGEAL HERNIA REPAIR LAPAROSCOPIC;  Surgeon: Jovanny Caanda MD;  Location:  [facility];  Service: General;  Laterality: N/A;    SUBTOTAL HYSTERECTOMY      laparoscopic; still has ovaries, complicated by left DVT    US VENOUS EXTREMITY UNILATERAL  2022    Normal, no DVT    US VENOUS EXTREMITY UNILATERAL  02/10/2022    Partially occlusive superficial thrombophlebitis greater saphenous vein    US VENOUS EXTREMITY UNILATERAL  2021    Small amount residual thrombus greater saphenous vein    US VENOUS EXTREMITY UNILATERAL  2021    Superficial thrombosis greater saphenous vein    US VENOUS EXTREMITY UNILATERAL  03/10/2021    Occlusive extensive thrombus within the greater saphenous vein from upper calf throughout the thigh    VEIN LIGATION AND STRIPPING Bilateral      Outpatient Medications Marked as Taking for the 23 encounter (Office Visit) with Candy Rizvi PA-C   Medication Sig Dispense Refill     apixaban (ELIQUIS) 5 MG tablet tablet Take 1 tablet by mouth Daily. PT TO HOLD (3) DAYS PRIOR TO PROCEDURE PER DR. JON.      CRANBERRY PO Take 525 mg by mouth Daily As Needed.      cyclobenzaprine (FLEXERIL) 10 MG tablet Take 1 tablet by mouth 2 (Two) Times a Day As Needed for Muscle Spasms.      docusate sodium (COLACE) 250 MG capsule Take 1 capsule by mouth Daily As Needed for Constipation.      escitalopram (LEXAPRO) 10 MG tablet Take 1 tablet by mouth Daily.      hydrOXYzine pamoate (VISTARIL) 25 MG capsule Take 1 capsule by mouth 3 (Three) Times a Day As Needed.      loratadine (CLARITIN) 10 MG tablet Take 1 tablet by mouth Daily.      metroNIDAZOLE (METROGEL) 1 % gel Apply  topically to the appropriate area as directed 2 (Two) Times a Day.      multivitamin with minerals tablet tablet Take 1 tablet by mouth Daily.      omeprazole (priLOSEC) 40 MG capsule Take 1 capsule by mouth Daily for 60 days. 60 capsule 0    Probiotic Product (PROBIOTIC PO) Take  by mouth Daily.      topiramate (TOPAMAX) 25 MG tablet Take 1 tablet by mouth Daily.      tretinoin (RETIN-A) 0.025 % gel Apply  topically to the appropriate area as directed Every Night.      ursodiol (ACTIGALL) 300 MG capsule Take 1 capsule by mouth 2 (Two) Times a Day for 30 days. 60 capsule 5    Vitamin D, Cholecalciferol, 50 MCG (2000 UT) capsule Take  by mouth Daily.       Allergies   Allergen Reactions    Morphine Hives and Itching       Social History     Socioeconomic History    Marital status:    Tobacco Use    Smoking status: Former     Packs/day: 1.00     Years: 10.00     Pack years: 10.00     Types: Cigarettes     Quit date: 2022     Years since quittin.1    Smokeless tobacco: Never    Tobacco comments:     she had stopped in 2017 for a few years then restarted 10/2021. Has stopped again 2022.   Vaping Use    Vaping Use: Never used   Substance and Sexual Activity    Alcohol use: Not Currently     Comment: occasionally    Drug use:  "Never    Sexual activity: Yes     Partners: Male     Birth control/protection: Tubal ligation, Hysterectomy, Same-sex partner       /86 (BP Location: Left arm, Patient Position: Sitting)   Pulse 95   Temp 97.1 °F (36.2 °C)   Resp 16   Ht 163.8 cm (64.5\")   Wt 110 kg (243 lb)   SpO2 99%   BMI 41.07 kg/m²     Physical Exam  Constitutional:       Appearance: She is well-developed.   HENT:      Head: Normocephalic and atraumatic.   Cardiovascular:      Rate and Rhythm: Normal rate and regular rhythm.   Pulmonary:      Effort: Pulmonary effort is normal.      Breath sounds: Normal breath sounds.   Abdominal:      General: Bowel sounds are normal.      Palpations: Abdomen is soft.      Comments: Incisions healing well   Musculoskeletal:      Comments: LE without erythema, redness or swelling. Homans negative   Skin:     General: Skin is warm and dry.   Neurological:      Mental Status: She is alert.   Psychiatric:         Behavior: Behavior normal.         Assessment:  1 month s/p LSG/ HHR by  on 5/4/23, s/p urgent early recurrent paraesophageal hernia repair with falciform ligament wrap with Dr. Canada 5/8/23     ICD-10-CM ICD-9-CM   1. Status post bariatric surgery  Z98.84 V45.86   2. Chronic fatigue  R53.82 780.79   3. Obesity, Class III, BMI 40-49.9 (morbid obesity)  E66.01 278.01   4. Screening, iron deficiency anemia  Z13.0 V78.0   5. Malnutrition screen  Z13.21 V77.2   6. Postgastrectomy malabsorption  K91.2 579.3    Z90.3    7. Hypovitaminosis D  E55.9 268.9         Plan:   offered UGI + GBUS imaging, declined for now, will pursue if pain returns. Labs today. Advised being mindful of portion sizes and eating slow.  Continue to advance diet per manual.  Continue protein 70-100g/day.  Encouraged good food choices - high protein, low carb.  Continue fluids 64+oz per day. Continue routine exercise, lifting restrictions lifted.  Continue PPI. Cotn actigall. Continue to avoid NSAIDS, ASA, tramadol, " tobacco x 6 weeks postop, steroids x 8 weeks postop.  Routine bariatric labs ordered.  Continue vitamins w/ adjustments pending lab results.  Call w/ problems/concerns.    Class 3 Severe Obesity (BMI >=40). Obesity-related health conditions include the following:  as above . Obesity is improving with treatment. BMI is is above average; BMI management plan is completed. We discussed portion control and increasing exercise.        The patient was instructed to follow up in 2 months, sooner if needed.

## 2023-06-09 LAB
25(OH)D3+25(OH)D2 SERPL-MCNC: 60.8 NG/ML (ref 30–100)
ALBUMIN SERPL-MCNC: 4 G/DL (ref 3.8–4.8)
ALBUMIN/GLOB SERPL: 1.5 {RATIO} (ref 1.2–2.2)
ALP SERPL-CCNC: 53 IU/L (ref 44–121)
ALT SERPL-CCNC: 7 IU/L (ref 0–32)
AST SERPL-CCNC: 11 IU/L (ref 0–40)
BASOPHILS # BLD AUTO: 0 X10E3/UL (ref 0–0.2)
BASOPHILS NFR BLD AUTO: 1 %
BILIRUB SERPL-MCNC: 0.3 MG/DL (ref 0–1.2)
BUN SERPL-MCNC: 9 MG/DL (ref 6–20)
BUN/CREAT SERPL: 9 (ref 9–23)
CALCIUM SERPL-MCNC: 9 MG/DL (ref 8.7–10.2)
CHLORIDE SERPL-SCNC: 106 MMOL/L (ref 96–106)
CO2 SERPL-SCNC: 22 MMOL/L (ref 20–29)
CREAT SERPL-MCNC: 0.97 MG/DL (ref 0.57–1)
EGFRCR SERPLBLD CKD-EPI 2021: 78 ML/MIN/1.73
EOSINOPHIL # BLD AUTO: 0.2 X10E3/UL (ref 0–0.4)
EOSINOPHIL NFR BLD AUTO: 2 %
ERYTHROCYTE [DISTWIDTH] IN BLOOD BY AUTOMATED COUNT: 14 % (ref 11.7–15.4)
FERRITIN SERPL-MCNC: 186 NG/ML (ref 15–150)
FOLATE SERPL-MCNC: >20 NG/ML
GLOBULIN SER CALC-MCNC: 2.7 G/DL (ref 1.5–4.5)
GLUCOSE SERPL-MCNC: 94 MG/DL (ref 70–99)
HCT VFR BLD AUTO: 41.5 % (ref 34–46.6)
HGB BLD-MCNC: 13.6 G/DL (ref 11.1–15.9)
IMM GRANULOCYTES # BLD AUTO: 0 X10E3/UL (ref 0–0.1)
IMM GRANULOCYTES NFR BLD AUTO: 0 %
IRON SERPL-MCNC: 45 UG/DL (ref 27–159)
LYMPHOCYTES # BLD AUTO: 2.3 X10E3/UL (ref 0.7–3.1)
LYMPHOCYTES NFR BLD AUTO: 35 %
MCH RBC QN AUTO: 29.4 PG (ref 26.6–33)
MCHC RBC AUTO-ENTMCNC: 32.8 G/DL (ref 31.5–35.7)
MCV RBC AUTO: 90 FL (ref 79–97)
METHYLMALONATE SERPL-SCNC: 57 NMOL/L (ref 0–378)
MONOCYTES # BLD AUTO: 0.5 X10E3/UL (ref 0.1–0.9)
MONOCYTES NFR BLD AUTO: 8 %
NEUTROPHILS # BLD AUTO: 3.6 X10E3/UL (ref 1.4–7)
NEUTROPHILS NFR BLD AUTO: 54 %
PLATELET # BLD AUTO: 261 X10E3/UL (ref 150–450)
POTASSIUM SERPL-SCNC: 4 MMOL/L (ref 3.5–5.2)
PREALB SERPL-MCNC: 16 MG/DL (ref 14–35)
PROT SERPL-MCNC: 6.7 G/DL (ref 6–8.5)
RBC # BLD AUTO: 4.63 X10E6/UL (ref 3.77–5.28)
SODIUM SERPL-SCNC: 143 MMOL/L (ref 134–144)
VIT B1 BLD-SCNC: 127 NMOL/L (ref 66.5–200)
WBC # BLD AUTO: 6.6 X10E3/UL (ref 3.4–10.8)

## 2023-08-02 ENCOUNTER — OFFICE VISIT (OUTPATIENT)
Dept: CARDIOLOGY | Facility: CLINIC | Age: 38
End: 2023-08-02
Payer: MEDICAID

## 2023-08-02 VITALS
WEIGHT: 222.6 LBS | BODY MASS INDEX: 38 KG/M2 | SYSTOLIC BLOOD PRESSURE: 130 MMHG | HEIGHT: 64 IN | DIASTOLIC BLOOD PRESSURE: 60 MMHG | HEART RATE: 60 BPM

## 2023-08-02 DIAGNOSIS — I83.812 VARICOSE VEINS OF LEFT LOWER EXTREMITY WITH PAIN: Primary | ICD-10-CM

## 2023-08-02 DIAGNOSIS — Z79.01 LONG TERM (CURRENT) USE OF ANTICOAGULANTS: ICD-10-CM

## 2023-08-02 DIAGNOSIS — I82.4Y2 DEEP VEIN THROMBOSIS (DVT) OF PROXIMAL VEIN OF LEFT LOWER EXTREMITY, UNSPECIFIED CHRONICITY: ICD-10-CM

## 2023-08-02 RX ORDER — OMEPRAZOLE 40 MG/1
40 CAPSULE, DELAYED RELEASE ORAL DAILY
Qty: 90 CAPSULE | Refills: 3 | Status: SHIPPED | OUTPATIENT
Start: 2023-08-02

## 2023-08-02 RX ORDER — FERROUS SULFATE 325(65) MG
325 TABLET ORAL DAILY
COMMUNITY

## 2023-08-02 RX ORDER — URSODIOL 300 MG/1
300 CAPSULE ORAL 2 TIMES DAILY
COMMUNITY

## 2023-08-02 RX ORDER — OMEPRAZOLE 40 MG/1
40 CAPSULE, DELAYED RELEASE ORAL DAILY
COMMUNITY
End: 2023-08-02 | Stop reason: SDUPTHER

## 2023-08-04 ENCOUNTER — OFFICE VISIT (OUTPATIENT)
Dept: BARIATRICS/WEIGHT MGMT | Facility: CLINIC | Age: 38
End: 2023-08-04
Payer: MEDICAID

## 2023-08-04 VITALS
OXYGEN SATURATION: 99 % | DIASTOLIC BLOOD PRESSURE: 70 MMHG | BODY MASS INDEX: 36.49 KG/M2 | TEMPERATURE: 97.3 F | SYSTOLIC BLOOD PRESSURE: 114 MMHG | HEIGHT: 65 IN | HEART RATE: 71 BPM | RESPIRATION RATE: 16 BRPM | WEIGHT: 219 LBS

## 2023-08-04 DIAGNOSIS — E55.9 HYPOVITAMINOSIS D: ICD-10-CM

## 2023-08-04 DIAGNOSIS — Z13.0 SCREENING, IRON DEFICIENCY ANEMIA: ICD-10-CM

## 2023-08-04 DIAGNOSIS — Z90.3 POSTGASTRECTOMY MALABSORPTION: Primary | ICD-10-CM

## 2023-08-04 DIAGNOSIS — Z13.21 MALNUTRITION SCREEN: ICD-10-CM

## 2023-08-04 DIAGNOSIS — E66.9 OBESITY, CLASS II, BMI 35-39.9: ICD-10-CM

## 2023-08-04 DIAGNOSIS — R53.82 CHRONIC FATIGUE: ICD-10-CM

## 2023-08-04 DIAGNOSIS — K91.2 POSTGASTRECTOMY MALABSORPTION: Primary | ICD-10-CM

## 2023-08-04 PROCEDURE — 1160F RVW MEDS BY RX/DR IN RCRD: CPT | Performed by: PHYSICIAN ASSISTANT

## 2023-08-04 PROCEDURE — 99024 POSTOP FOLLOW-UP VISIT: CPT | Performed by: PHYSICIAN ASSISTANT

## 2023-08-04 PROCEDURE — 1159F MED LIST DOCD IN RCRD: CPT | Performed by: PHYSICIAN ASSISTANT

## 2023-08-10 LAB
25(OH)D3+25(OH)D2 SERPL-MCNC: 65.8 NG/ML (ref 30–100)
ALBUMIN SERPL-MCNC: 4 G/DL (ref 3.9–4.9)
ALBUMIN/GLOB SERPL: 1.7 {RATIO} (ref 1.2–2.2)
ALP SERPL-CCNC: 58 IU/L (ref 44–121)
ALT SERPL-CCNC: 7 IU/L (ref 0–32)
AST SERPL-CCNC: 12 IU/L (ref 0–40)
BASOPHILS # BLD AUTO: 0 X10E3/UL (ref 0–0.2)
BASOPHILS NFR BLD AUTO: 1 %
BILIRUB SERPL-MCNC: 0.4 MG/DL (ref 0–1.2)
BUN SERPL-MCNC: 9 MG/DL (ref 6–20)
BUN/CREAT SERPL: 11 (ref 9–23)
CALCIUM SERPL-MCNC: 9 MG/DL (ref 8.7–10.2)
CHLORIDE SERPL-SCNC: 109 MMOL/L (ref 96–106)
CO2 SERPL-SCNC: 22 MMOL/L (ref 20–29)
CREAT SERPL-MCNC: 0.8 MG/DL (ref 0.57–1)
EGFRCR SERPLBLD CKD-EPI 2021: 98 ML/MIN/1.73
EOSINOPHIL # BLD AUTO: 0.1 X10E3/UL (ref 0–0.4)
EOSINOPHIL NFR BLD AUTO: 2 %
ERYTHROCYTE [DISTWIDTH] IN BLOOD BY AUTOMATED COUNT: 13.2 % (ref 11.7–15.4)
FERRITIN SERPL-MCNC: 239 NG/ML (ref 15–150)
FOLATE SERPL-MCNC: >20 NG/ML
GLOBULIN SER CALC-MCNC: 2.3 G/DL (ref 1.5–4.5)
GLUCOSE SERPL-MCNC: 85 MG/DL (ref 70–99)
HCT VFR BLD AUTO: 40.6 % (ref 34–46.6)
HGB BLD-MCNC: 13.2 G/DL (ref 11.1–15.9)
IMM GRANULOCYTES # BLD AUTO: 0 X10E3/UL (ref 0–0.1)
IMM GRANULOCYTES NFR BLD AUTO: 0 %
IRON SERPL-MCNC: 72 UG/DL (ref 27–159)
LYMPHOCYTES # BLD AUTO: 2.3 X10E3/UL (ref 0.7–3.1)
LYMPHOCYTES NFR BLD AUTO: 37 %
MCH RBC QN AUTO: 29.6 PG (ref 26.6–33)
MCHC RBC AUTO-ENTMCNC: 32.5 G/DL (ref 31.5–35.7)
MCV RBC AUTO: 91 FL (ref 79–97)
METHYLMALONATE SERPL-SCNC: 85 NMOL/L (ref 0–378)
MONOCYTES # BLD AUTO: 0.5 X10E3/UL (ref 0.1–0.9)
MONOCYTES NFR BLD AUTO: 7 %
NEUTROPHILS # BLD AUTO: 3.3 X10E3/UL (ref 1.4–7)
NEUTROPHILS NFR BLD AUTO: 53 %
PLATELET # BLD AUTO: 329 X10E3/UL (ref 150–450)
POTASSIUM SERPL-SCNC: 4.2 MMOL/L (ref 3.5–5.2)
PREALB SERPL-MCNC: 19 MG/DL (ref 14–35)
PROT SERPL-MCNC: 6.3 G/DL (ref 6–8.5)
RBC # BLD AUTO: 4.46 X10E6/UL (ref 3.77–5.28)
SODIUM SERPL-SCNC: 145 MMOL/L (ref 134–144)
VIT B1 BLD-SCNC: 215.2 NMOL/L (ref 66.5–200)
WBC # BLD AUTO: 6.3 X10E3/UL (ref 3.4–10.8)

## 2023-11-02 NOTE — TELEPHONE ENCOUNTER
I called [facility] and spoke with patient's hematologist to clarify that she would still like to bridge patient with Lovenox therapy despite there being no residual clot on most recent venous duplex.  She still recommended bridging with Lovenox prior to surgery, but deferred prescribing Lovenox herself and recommended we have her PCP prescribed this.  I discussed with Dr. Canada and we will have the patient hold Eliquis 3 days prior to surgery and she will take Lovenox 60 mg subcutaneous for this 3 days leading up to surgery.  I called the patient and discussed this with her today and she understood with no questions.  Rx sent to her pharmacy.    ----- Message from Jovanny Canada MD sent at 4/27/2023  9:37 AM EDT -----    Hold Eliquis 3 days, lovenox those 3 days, thanks!                 rides, unreliable from others

## 2023-11-03 ENCOUNTER — DOCUMENTATION (OUTPATIENT)
Dept: BARIATRICS/WEIGHT MGMT | Facility: CLINIC | Age: 38
End: 2023-11-03
Payer: MEDICAID

## 2023-11-03 ENCOUNTER — OFFICE VISIT (OUTPATIENT)
Dept: BARIATRICS/WEIGHT MGMT | Facility: CLINIC | Age: 38
End: 2023-11-03
Payer: MEDICAID

## 2023-11-03 VITALS
SYSTOLIC BLOOD PRESSURE: 120 MMHG | OXYGEN SATURATION: 96 % | HEIGHT: 65 IN | RESPIRATION RATE: 14 BRPM | BODY MASS INDEX: 34.91 KG/M2 | DIASTOLIC BLOOD PRESSURE: 76 MMHG | WEIGHT: 209.5 LBS | TEMPERATURE: 97.5 F | HEART RATE: 96 BPM

## 2023-11-03 DIAGNOSIS — Z13.0 SCREENING, IRON DEFICIENCY ANEMIA: ICD-10-CM

## 2023-11-03 DIAGNOSIS — Z90.3 POSTGASTRECTOMY MALABSORPTION: Primary | ICD-10-CM

## 2023-11-03 DIAGNOSIS — Z13.21 MALNUTRITION SCREEN: ICD-10-CM

## 2023-11-03 DIAGNOSIS — E66.9 OBESITY, CLASS II, BMI 35-39.9: ICD-10-CM

## 2023-11-03 DIAGNOSIS — K91.2 POSTGASTRECTOMY MALABSORPTION: Primary | ICD-10-CM

## 2023-11-03 DIAGNOSIS — E55.9 HYPOVITAMINOSIS D: ICD-10-CM

## 2023-11-03 PROCEDURE — 1160F RVW MEDS BY RX/DR IN RCRD: CPT | Performed by: PHYSICIAN ASSISTANT

## 2023-11-03 PROCEDURE — 1159F MED LIST DOCD IN RCRD: CPT | Performed by: PHYSICIAN ASSISTANT

## 2023-11-03 PROCEDURE — 99214 OFFICE O/P EST MOD 30 MIN: CPT | Performed by: PHYSICIAN ASSISTANT

## 2023-11-03 RX ORDER — SUMATRIPTAN 50 MG/1
50 TABLET, FILM COATED ORAL
COMMUNITY

## 2023-11-03 NOTE — PROGRESS NOTES
[facility] Bariatric Surgery  [address]  [address]  Formerly [facility] [id number] [phone]        Patient Name:  Beverly Giron  :  1985      Date of Visit: 2023      Reason for Visit:   6 months postop      HPI: Beverly Giron is a 38 y.o. female s/p LSG/ HHR by  on 23, s/p urgent early recurrent paraesophageal hernia repair with falciform ligament wrap with Dr. Canada 23      Doing ok. Does admit she has not been great with dietary choices and she is not tracking. Going for a lot of fast food and snacks. No real structure and seems to be grazing more. Feels like some foods don't sit as well as they used too. Beans make her stomach gurgle and meat does not taste the way it used to. Denies any nausea. Will notice some bloating usually when she overeats.  No issues/concerns. Denies dysphagia, reflux, nausea, vomiting, abdominal pain, hair loss, memory loss, vision changes, and numbness/tingling.  Getting ??g prot/day. Drinking 64 fluid oz/day.  3 month labs revealed bariatric levels wnl. Taking MVI, B12, B1, Calcium, Vit D, iron, and Vit C.  On Omeprazole  and Actigall .  Physical activity: not structured exercise.     Presurgery weight: 280 pounds.  Today's weight is 95 kg (209 lb 8 oz) pounds, today's  Body mass index is 35.41 kg/m²., and weight loss since surgery is 71 pounds.      Past Medical History:   Diagnosis Date    Anxiety and depression     Carpal tunnel syndrome     Elevated LDL cholesterol level     Former smoker     Frequent headaches     Genital herpes     GERD (gastroesophageal reflux disease)     Heart murmur     Heartburn     Tums PRN; EGD Dr. Canada     Hiatal hernia with gastroesophageal reflux     EGD Dr. Canada     History of DVT of lower extremity     following partial hysterectomy- believed to be provoked. Following with heme/onc    History of partial hysterectomy     HPV (human papilloma virus) infection     Hx  of eye surgery     Hx of ovarian cyst     Low back pain     no steroids. PRN NSAIDs    Migraines     PRN flexeril and nsaids    Mitral regurgitation     Mild     (normal spontaneous vaginal delivery)     x 3, 1st two and 4th deliveries.  No complics    Varicose veins of both lower extremities     Venous insufficiency      Past Surgical History:   Procedure Laterality Date     SECTION N/A     x 3. 3rd and last 2 deliveries    ECHO - CONVERTED  2022    EF 60%. Trace -Mild MR. LA- 4.2    ENDOSCOPY N/A 2023    Procedure: ESOPHAGOGASTRODUODENOSCOPY;  Surgeon: Jovanny Canada MD;  Location:  [facility];  Service: Bariatric;  Laterality: N/A;  scope     EYE SURGERY Bilateral 1995    GASTRECTOMY N/A 2023    Procedure: LAPAROSCOPIC GASTRECTOMY, HIATAL HERNIA REPAIR LAPAROSCOPIC;  Surgeon: Jovanny Canada MD;  Location:  [facility];  Service: Bariatric;  Laterality: N/A;  hiatal hernia time: [phone]    LAPAROSCOPIC TUBAL LIGATION      PARAESOPHAGEAL HERNIA REPAIR N/A 2023    Procedure: EARLY RECURRENT PARAESOPHAGEAL HERNIA REPAIR LAPAROSCOPIC;  Surgeon: Jovanny Canada MD;  Location:  [facility];  Service: General;  Laterality: N/A;    SUBTOTAL HYSTERECTOMY      laparoscopic; still has ovaries, complicated by left DVT    US VENOUS EXTREMITY UNILATERAL  2022    Normal, no DVT    US VENOUS EXTREMITY UNILATERAL  02/10/2022    Partially occlusive superficial thrombophlebitis greater saphenous vein    US VENOUS EXTREMITY UNILATERAL  2021    Small amount residual thrombus greater saphenous vein    US VENOUS EXTREMITY UNILATERAL  2021    Superficial thrombosis greater saphenous vein    US VENOUS EXTREMITY UNILATERAL  03/10/2021    Occlusive extensive thrombus within the greater saphenous vein from upper calf throughout the thigh    VEIN LIGATION AND STRIPPING Bilateral      Outpatient Medications Marked as Taking for the 11/3/23 encounter (Office Visit) with  Alexsandra Ruiz PA   Medication Sig Dispense Refill    apixaban (ELIQUIS) 5 MG tablet tablet Take 1 tablet by mouth Daily. PT TO HOLD (3) DAYS PRIOR TO PROCEDURE PER DR. JON.      Ascorbic Acid (Vitamin C) 500 MG chewable tablet Chew Daily.      Calcium Carbonate (CALCIUM 500 PO) Take  by mouth 2 (Two) Times a Day.      CRANBERRY PO Take 500 mg by mouth Daily.      cyclobenzaprine (FLEXERIL) 10 MG tablet Take 1 tablet by mouth 2 (Two) Times a Day As Needed for Muscle Spasms.      docusate sodium (COLACE) 250 MG capsule Take 1 capsule by mouth Daily As Needed for Constipation.      escitalopram (LEXAPRO) 10 MG tablet Take 1 tablet by mouth Daily.      ferrous sulfate 325 (65 FE) MG tablet Take 1 tablet by mouth Daily.      hydrOXYzine pamoate (VISTARIL) 25 MG capsule Take 1 capsule by mouth 3 (Three) Times a Day As Needed.      loratadine (CLARITIN) 10 MG tablet Take 1 tablet by mouth Daily.      metroNIDAZOLE (METROGEL) 1 % gel Apply  topically to the appropriate area as directed 2 (Two) Times a Day.      multivitamin with minerals tablet tablet Take 1 tablet by mouth Daily.      omeprazole (priLOSEC) 40 MG capsule Take 1 capsule by mouth Daily. 90 capsule 3    Probiotic Product (PROBIOTIC PO) Take  by mouth Daily.      topiramate (TOPAMAX) 25 MG tablet Take 1 tablet by mouth Daily.      tretinoin (RETIN-A) 0.025 % gel Apply  topically to the appropriate area as directed Every Night.      ursodiol (ACTIGALL) 300 MG capsule Take 1 capsule by mouth 2 (Two) Times a Day.      Vitamin D, Cholecalciferol, 50 MCG (2000 UT) capsule Take  by mouth Daily.         Allergies   Allergen Reactions    Morphine Hives and Itching       Social History     Socioeconomic History    Marital status:    Tobacco Use    Smoking status: Former     Packs/day: 1.00     Years: 10.00     Additional pack years: 0.00     Total pack years: 10.00     Types: Cigarettes     Quit date: 2022     Years since quittin.5    Smokeless  "tobacco: Never    Tobacco comments:     she had stopped in 2017 for a few years then restarted 10/2021. Has stopped again 4/2022.   Vaping Use    Vaping Use: Never used   Substance and Sexual Activity    Alcohol use: Not Currently     Comment: occasionally    Drug use: Never    Sexual activity: Yes     Partners: Male     Birth control/protection: Tubal ligation, Hysterectomy, Same-sex partner     Social History     Social History Narrative    Patient is from [place], [place]. She is  with 5 children. She works full time at [employer] as a ..       /76 (BP Location: Right arm, Patient Position: Sitting)   Pulse 96   Temp 97.5 °F (36.4 °C)   Resp 14   Ht 163.8 cm (64.5\")   Wt 95 kg (209 lb 8 oz)   SpO2 96%   BMI 35.41 kg/m²     Physical Exam  Constitutional:       Appearance: She is obese.   HENT:      Head: Normocephalic and atraumatic.   Cardiovascular:      Rate and Rhythm: Normal rate and regular rhythm.   Pulmonary:      Effort: Pulmonary effort is normal.      Breath sounds: Normal breath sounds.   Abdominal:      General: Bowel sounds are normal. There is no distension.      Palpations: Abdomen is soft. There is no mass.      Tenderness: There is no abdominal tenderness.   Skin:     General: Skin is warm and dry.   Neurological:      General: No focal deficit present.      Mental Status: She is alert and oriented to person, place, and time.   Psychiatric:         Mood and Affect: Mood normal.         Behavior: Behavior normal.           Assessment:  6 months s/p LSG/ HHR by  on 5/4/23, s/p urgent early recurrent paraesophageal hernia repair with falciform ligament wrap with Dr. Canada 5/8/23      ICD-10-CM ICD-9-CM   1. Postgastrectomy malabsorption  K91.2 579.3    Z90.3    2. Hypovitaminosis D  E55.9 268.9   3. Screening, iron deficiency anemia  Z13.0 V78.0   4. Malnutrition screen  Z13.21 V77.2   5. Obesity, Class II, BMI 35-39.9  E66.9 278.00          Class 2 Severe " Obesity (BMI >=35 and <=39.9). Obesity-related health conditions include the following:  as above . Obesity is improving with treatment. BMI is is above average; BMI management plan is completed. We discussed low calorie, low carb based diet program, portion control, and increasing exercise.      Plan:  Doing ok. Discussed good food choices and healthy habits at length. Important to get back on a routine and tracking intake. I will have her meet with our dietician today.  Increase protein >70g/day.  Increase routine physical activity.  Routine bariatric labs ordered.  Continue vitamins w/ adjustments pending lab results.  Call w/ problems/concerns.     The patient was instructed to follow up in 3 months, sooner if needed.      I spent over 30 minutes caring for this patient on this date of service. This time includes time spent by me in the following activities: preparing for the visit, reviewing tests, performing a medically appropriate examination and/or evaluation, ordering medications, tests, or procedures and documenting information in the medical record.

## 2023-11-03 NOTE — PROGRESS NOTES
Bariatric Nutrition Consult     Name: Beverly Giron   YOB: 1985   Age: 38 y.o.   MRN: [id number]    Consult Date    11/3/23    Surgery:      sleeve                             Date of surgery:5/4/23    Patient is 6 months post op.     Height: 163.8cm          Weight: 209lbs     Pre Op weight: 280lbs              Current BMI: 35.41    Weight change:  lost 71lbs, weight loss slowing down due to stress and eating high simple sugar foods, not achieving protein goals, old habits creeping back in.     Reports vomiting from overeating, eating foods that are known to swell and when forcing liquids with meals. Not drinking enough water, sips on coffee all day and drinking apple juice. Diet is very high in fat and carbs      Diet history reveals:      1-2 c Coffee half milk and 1/4 c creamer    Lunch: schwab beans/pb and j sandwich/tuna salad (homemade with miracle whip, egg, relish, mustard, onion) with saltines/bag of ritz crackers    Dinner: chicken from [employer] removed skin, fries/red beans and rice    Snacks in the evening: donuts, 1/2 box viki covered peanuts, cereal (sweetened variety)        Drinks:  1-2 c coffee, possibly 1c water max, apple juice, occasional sprite    Exercise/activity:  none    Main bariatric nutrition principles discussed and explained and queries answered. Encouraged patient to focus on       Pt admits she needs to get back on track and is motivated to do so  She plans to begin by grocery shopping and buying foods she can have   Ask her family to move the tempting foods from line of sight  Focus on protein first, veg next and complex carbs last  Meal and snack ideas discussed  Increase water intake, omit juice  Avoid HFCS  100g protein daily, 3 meals   Omit grazing  Pt to call with leslie Dozier RD, LD

## 2023-11-08 LAB
25(OH)D3+25(OH)D2 SERPL-MCNC: 50.7 NG/ML (ref 30–100)
ALBUMIN SERPL-MCNC: 4.1 G/DL (ref 3.9–4.9)
ALBUMIN/GLOB SERPL: 1.8 {RATIO} (ref 1.2–2.2)
ALP SERPL-CCNC: 58 IU/L (ref 44–121)
ALT SERPL-CCNC: 7 IU/L (ref 0–32)
AST SERPL-CCNC: 12 IU/L (ref 0–40)
BASOPHILS # BLD AUTO: 0 X10E3/UL (ref 0–0.2)
BASOPHILS NFR BLD AUTO: 0 %
BILIRUB SERPL-MCNC: 0.3 MG/DL (ref 0–1.2)
BUN SERPL-MCNC: 12 MG/DL (ref 6–20)
BUN/CREAT SERPL: 14 (ref 9–23)
CALCIUM SERPL-MCNC: 9.1 MG/DL (ref 8.7–10.2)
CHLORIDE SERPL-SCNC: 107 MMOL/L (ref 96–106)
CO2 SERPL-SCNC: 24 MMOL/L (ref 20–29)
CREAT SERPL-MCNC: 0.85 MG/DL (ref 0.57–1)
EGFRCR SERPLBLD CKD-EPI 2021: 91 ML/MIN/1.73
EOSINOPHIL # BLD AUTO: 0.1 X10E3/UL (ref 0–0.4)
EOSINOPHIL NFR BLD AUTO: 1 %
ERYTHROCYTE [DISTWIDTH] IN BLOOD BY AUTOMATED COUNT: 12.9 % (ref 11.7–15.4)
FERRITIN SERPL-MCNC: 135 NG/ML (ref 15–150)
FOLATE SERPL-MCNC: 14.9 NG/ML
GLOBULIN SER CALC-MCNC: 2.3 G/DL (ref 1.5–4.5)
GLUCOSE SERPL-MCNC: 81 MG/DL (ref 70–99)
HCT VFR BLD AUTO: 40.7 % (ref 34–46.6)
HGB BLD-MCNC: 14.1 G/DL (ref 11.1–15.9)
IMM GRANULOCYTES # BLD AUTO: 0 X10E3/UL (ref 0–0.1)
IMM GRANULOCYTES NFR BLD AUTO: 0 %
IRON SERPL-MCNC: 65 UG/DL (ref 27–159)
LYMPHOCYTES # BLD AUTO: 2.7 X10E3/UL (ref 0.7–3.1)
LYMPHOCYTES NFR BLD AUTO: 36 %
MCH RBC QN AUTO: 30.9 PG (ref 26.6–33)
MCHC RBC AUTO-ENTMCNC: 34.6 G/DL (ref 31.5–35.7)
MCV RBC AUTO: 89 FL (ref 79–97)
METHYLMALONATE SERPL-SCNC: 118 NMOL/L (ref 0–378)
MONOCYTES # BLD AUTO: 0.4 X10E3/UL (ref 0.1–0.9)
MONOCYTES NFR BLD AUTO: 6 %
NEUTROPHILS # BLD AUTO: 4.4 X10E3/UL (ref 1.4–7)
NEUTROPHILS NFR BLD AUTO: 57 %
PLATELET # BLD AUTO: 343 X10E3/UL (ref 150–450)
POTASSIUM SERPL-SCNC: 4.7 MMOL/L (ref 3.5–5.2)
PREALB SERPL-MCNC: 24 MG/DL (ref 14–35)
PROT SERPL-MCNC: 6.4 G/DL (ref 6–8.5)
RBC # BLD AUTO: 4.57 X10E6/UL (ref 3.77–5.28)
SODIUM SERPL-SCNC: 141 MMOL/L (ref 134–144)
VIT B1 BLD-SCNC: 184.2 NMOL/L (ref 66.5–200)
WBC # BLD AUTO: 7.6 X10E3/UL (ref 3.4–10.8)

## 2024-02-02 ENCOUNTER — OFFICE VISIT (OUTPATIENT)
Dept: BARIATRICS/WEIGHT MGMT | Facility: CLINIC | Age: 39
End: 2024-02-02
Payer: MEDICAID

## 2024-02-02 VITALS
HEART RATE: 59 BPM | BODY MASS INDEX: 36.15 KG/M2 | TEMPERATURE: 97.5 F | SYSTOLIC BLOOD PRESSURE: 106 MMHG | DIASTOLIC BLOOD PRESSURE: 68 MMHG | HEIGHT: 65 IN | OXYGEN SATURATION: 98 % | WEIGHT: 217 LBS

## 2024-02-02 DIAGNOSIS — Z90.3 POSTGASTRECTOMY MALABSORPTION: ICD-10-CM

## 2024-02-02 DIAGNOSIS — K91.2 POSTGASTRECTOMY MALABSORPTION: ICD-10-CM

## 2024-02-02 DIAGNOSIS — Z13.0 SCREENING, IRON DEFICIENCY ANEMIA: ICD-10-CM

## 2024-02-02 DIAGNOSIS — E66.9 OBESITY, CLASS II, BMI 35-39.9: Primary | ICD-10-CM

## 2024-02-02 DIAGNOSIS — E55.9 HYPOVITAMINOSIS D: ICD-10-CM

## 2024-02-02 PROCEDURE — 99214 OFFICE O/P EST MOD 30 MIN: CPT | Performed by: PHYSICIAN ASSISTANT

## 2024-02-02 PROCEDURE — 1160F RVW MEDS BY RX/DR IN RCRD: CPT | Performed by: PHYSICIAN ASSISTANT

## 2024-02-02 PROCEDURE — 1159F MED LIST DOCD IN RCRD: CPT | Performed by: PHYSICIAN ASSISTANT

## 2024-02-02 NOTE — PROGRESS NOTES
[facility] Bariatric Surgery  [address]  [address]  [facility] [id number] [phone]        Patient Name:  Beverly Giron  :  1985      Date of Visit: 2024      Reason for Visit:   9 months postop      HPI: Beverly Giron is a 38 y.o. female s/p LSG/ HHR by  on 23, s/p urgent early recurrent paraesophageal hernia repair with falciform ligament wrap with Dr. Canada 23       Doing ok. Has had some weight gain since her last visit. Denies dysphagia, reflux, nausea, vomiting, abdominal pain, memory loss, vision changes, and numbness/tingling.  Getting ??g prot/day. Admits she has gotten off track with the holidays and has not been feeling very motivated.  Drinking 64 fluid oz/day.  6 month labs advised daily MVI only. Taking MVI, B12, B1, and Vit D. Not on any GI meds.  Physical activity: no structured exercise currently. Is looking into gym memberships..     Presurgery weight: 280 pounds.  Today's weight is 98.4 kg (217 lb) pounds, today's  Body mass index is 36.67 kg/m²., and weight loss since surgery is 63 pounds.      Past Medical History:   Diagnosis Date    Anxiety and depression     Carpal tunnel syndrome     Elevated LDL cholesterol level     Former smoker     Frequent headaches     Genital herpes     GERD (gastroesophageal reflux disease)     Heart murmur     Heartburn     Tums PRN; EGD Dr. Canada     Hiatal hernia with gastroesophageal reflux     EGD Dr. Canada     History of DVT of lower extremity     following partial hysterectomy- believed to be provoked. Following with heme/onc    History of partial hysterectomy     HPV (human papilloma virus) infection     Hx of eye surgery     Hx of ovarian cyst     Low back pain     no steroids. PRN NSAIDs    Migraines     PRN flexeril and nsaids    Mitral regurgitation     Mild     (normal spontaneous vaginal delivery)     x 3, 1st two and 4th deliveries.  No complics    Varicose  veins of both lower extremities     Venous insufficiency      Past Surgical History:   Procedure Laterality Date     SECTION N/A     x 3. 3rd and last 2 deliveries    ECHO - CONVERTED  2022    EF 60%. Trace -Mild MR. LA- 4.2    ENDOSCOPY N/A 2023    Procedure: ESOPHAGOGASTRODUODENOSCOPY;  Surgeon: Jovanny Canada MD;  Location:  [facility];  Service: Bariatric;  Laterality: N/A;  scope     EYE SURGERY Bilateral 1995    GASTRECTOMY N/A 2023    Procedure: LAPAROSCOPIC GASTRECTOMY, HIATAL HERNIA REPAIR LAPAROSCOPIC;  Surgeon: Jovanny Canada MD;  Location:  [facility];  Service: Bariatric;  Laterality: N/A;  hiatal hernia time: [id number]    LAPAROSCOPIC TUBAL LIGATION      PARAESOPHAGEAL HERNIA REPAIR N/A 2023    Procedure: EARLY RECURRENT PARAESOPHAGEAL HERNIA REPAIR LAPAROSCOPIC;  Surgeon: Jovanny Canada MD;  Location:  [facility];  Service: General;  Laterality: N/A;    SUBTOTAL HYSTERECTOMY      laparoscopic; still has ovaries, complicated by left DVT    US VENOUS EXTREMITY UNILATERAL  2022    Normal, no DVT    US VENOUS EXTREMITY UNILATERAL  02/10/2022    Partially occlusive superficial thrombophlebitis greater saphenous vein    US VENOUS EXTREMITY UNILATERAL  2021    Small amount residual thrombus greater saphenous vein    US VENOUS EXTREMITY UNILATERAL  2021    Superficial thrombosis greater saphenous vein    US VENOUS EXTREMITY UNILATERAL  03/10/2021    Occlusive extensive thrombus within the greater saphenous vein from upper calf throughout the thigh    VEIN LIGATION AND STRIPPING Bilateral      Outpatient Medications Marked as Taking for the 24 encounter (Office Visit) with Alexsandra Ruiz PA   Medication Sig Dispense Refill    apixaban (ELIQUIS) 5 MG tablet tablet Take 1 tablet by mouth Daily. PT TO HOLD (3) DAYS PRIOR TO PROCEDURE PER DR. CANA[place].      Ascorbic Acid (Vitamin C) 500 MG chewable tablet Chew Daily.      Calcium Carbonate  (CALCIUM 500 PO) Take  by mouth 2 (Two) Times a Day.      CRANBERRY PO Take 500 mg by mouth Daily.      cyclobenzaprine (FLEXERIL) 10 MG tablet Take 1 tablet by mouth 2 (Two) Times a Day As Needed for Muscle Spasms.      docusate sodium (COLACE) 250 MG capsule Take 1 capsule by mouth Daily As Needed for Constipation.      escitalopram (LEXAPRO) 10 MG tablet Take 1 tablet by mouth Daily.      ferrous sulfate 325 (65 FE) MG tablet Take 1 tablet by mouth Daily.      hydrOXYzine pamoate (VISTARIL) 25 MG capsule Take 1 capsule by mouth 3 (Three) Times a Day As Needed.      loratadine (CLARITIN) 10 MG tablet Take 1 tablet by mouth Daily.      metroNIDAZOLE (METROGEL) 1 % gel Apply  topically to the appropriate area as directed 2 (Two) Times a Day.      multivitamin with minerals tablet tablet Take 1 tablet by mouth Daily.      Probiotic Product (PROBIOTIC PO) Take  by mouth Daily.      SUMAtriptan (IMITREX) 50 MG tablet Take 1 tablet by mouth Every 2 (Two) Hours As Needed for Migraine. Take one tablet at onset of headache. May repeat dose one time in 2 hours if headache not relieved.      topiramate (TOPAMAX) 25 MG tablet Take 1 tablet by mouth Daily.      tretinoin (RETIN-A) 0.025 % gel Apply  topically to the appropriate area as directed Every Night.      Vitamin D, Cholecalciferol, 50 MCG (2000 UT) capsule Take  by mouth Daily.         Allergies   Allergen Reactions    Morphine Hives and Itching       Social History     Socioeconomic History    Marital status:    Tobacco Use    Smoking status: Former     Packs/day: 1.00     Years: 10.00     Additional pack years: 0.00     Total pack years: 10.00     Types: Cigarettes     Quit date: 2022     Years since quittin.8    Smokeless tobacco: Never    Tobacco comments:     she had stopped in 2017 for a few years then restarted 10/2021. Has stopped again 2022.   Vaping Use    Vaping Use: Never used   Substance and Sexual Activity    Alcohol use: Not Currently     " Comment: occasionally    Drug use: Never    Sexual activity: Yes     Partners: Male     Birth control/protection: Tubal ligation, Hysterectomy, Same-sex partner     Social History     Social History Narrative    Patient is from [place], [place]. She is  with 5 children. She works full time at [email or web address] as a ..       /68 (BP Location: Right arm, Patient Position: Sitting)   Pulse 59   Temp 97.5 °F (36.4 °C)   Ht 163.8 cm (64.5\")   Wt 98.4 kg (217 lb)   SpO2 98%   BMI 36.67 kg/m²     Physical Exam  Constitutional:       Appearance: She is obese.   HENT:      Head: Normocephalic and atraumatic.   Cardiovascular:      Rate and Rhythm: Normal rate and regular rhythm.   Pulmonary:      Effort: Pulmonary effort is normal.      Breath sounds: Normal breath sounds.   Abdominal:      General: Bowel sounds are normal. There is no distension.      Palpations: Abdomen is soft. There is no mass.      Tenderness: There is no abdominal tenderness.   Skin:     General: Skin is warm and dry.   Neurological:      General: No focal deficit present.      Mental Status: She is alert and oriented to person, place, and time.   Psychiatric:         Mood and Affect: Mood normal.         Behavior: Behavior normal.           Assessment:  9 months s/p LSG/ HHR by  on 5/4/23, s/p urgent early recurrent paraesophageal hernia repair with falciform ligament wrap with Dr. Canada 5/8/23       ICD-10-CM ICD-9-CM   1. Obesity, Class II, BMI 35-39.9  E66.9 278.00   2. Postgastrectomy malabsorption  K91.2 579.3    Z90.3    3. Hypovitaminosis D  E55.9 268.9   4. Screening, iron deficiency anemia  Z13.0 V78.0          Class 2 Severe Obesity (BMI >=35 and <=39.9). Obesity-related health conditions include the following:  as above . Obesity is improving with treatment. BMI is is above average; BMI management plan is completed. We discussed low calorie, low carb based diet program, portion control, and increasing " exercise.      Plan:  Doing fine. Discussed good food choices and healthy habits to help get her back on track.  Aim for calorie goal of ~1813-0528 ramya/day and keep protein around 100g/day..  Start routine physical activity.  Routine bariatric labs ordered.  Continue vitamins w/ adjustments pending lab results-may be able to simplify vitamins pending results..  Call w/ problems/concerns.     The patient was instructed to follow up in 3 months, sooner if needed.    I spent over 30 minutes caring for this patient on this date of service. This time includes time spent by me in the following activities: preparing for the visit, reviewing tests, performing a medically appropriate examination and/or evaluation, ordering medications, tests, or procedures and documenting information in the medical record.

## 2024-02-10 LAB
25(OH)D3+25(OH)D2 SERPL-MCNC: 36.9 NG/ML (ref 30–100)
ALBUMIN SERPL-MCNC: 4.1 G/DL (ref 3.9–4.9)
ALBUMIN/GLOB SERPL: 1.6 {RATIO} (ref 1.2–2.2)
ALP SERPL-CCNC: 55 IU/L (ref 44–121)
ALT SERPL-CCNC: 9 IU/L (ref 0–32)
AST SERPL-CCNC: 12 IU/L (ref 0–40)
BASOPHILS # BLD AUTO: 0.1 X10E3/UL (ref 0–0.2)
BASOPHILS NFR BLD AUTO: 1 %
BILIRUB SERPL-MCNC: 0.3 MG/DL (ref 0–1.2)
BUN SERPL-MCNC: 12 MG/DL (ref 6–20)
BUN/CREAT SERPL: 13 (ref 9–23)
CALCIUM SERPL-MCNC: 9.2 MG/DL (ref 8.7–10.2)
CHLORIDE SERPL-SCNC: 104 MMOL/L (ref 96–106)
CO2 SERPL-SCNC: 24 MMOL/L (ref 20–29)
CREAT SERPL-MCNC: 0.94 MG/DL (ref 0.57–1)
EGFRCR SERPLBLD CKD-EPI 2021: 81 ML/MIN/1.73
EOSINOPHIL # BLD AUTO: 0.1 X10E3/UL (ref 0–0.4)
EOSINOPHIL NFR BLD AUTO: 2 %
ERYTHROCYTE [DISTWIDTH] IN BLOOD BY AUTOMATED COUNT: 12.4 % (ref 11.7–15.4)
FERRITIN SERPL-MCNC: 149 NG/ML (ref 15–150)
FOLATE SERPL-MCNC: 15.5 NG/ML
GLOBULIN SER CALC-MCNC: 2.5 G/DL (ref 1.5–4.5)
GLUCOSE SERPL-MCNC: 80 MG/DL (ref 70–99)
HCT VFR BLD AUTO: 41.3 % (ref 34–46.6)
HGB BLD-MCNC: 14.1 G/DL (ref 11.1–15.9)
IMM GRANULOCYTES # BLD AUTO: 0 X10E3/UL (ref 0–0.1)
IMM GRANULOCYTES NFR BLD AUTO: 0 %
IRON SERPL-MCNC: 106 UG/DL (ref 27–159)
LYMPHOCYTES # BLD AUTO: 3 X10E3/UL (ref 0.7–3.1)
LYMPHOCYTES NFR BLD AUTO: 44 %
MCH RBC QN AUTO: 31 PG (ref 26.6–33)
MCHC RBC AUTO-ENTMCNC: 34.1 G/DL (ref 31.5–35.7)
MCV RBC AUTO: 91 FL (ref 79–97)
METHYLMALONATE SERPL-SCNC: 147 NMOL/L (ref 0–378)
MONOCYTES # BLD AUTO: 0.4 X10E3/UL (ref 0.1–0.9)
MONOCYTES NFR BLD AUTO: 6 %
NEUTROPHILS # BLD AUTO: 3.3 X10E3/UL (ref 1.4–7)
NEUTROPHILS NFR BLD AUTO: 47 %
PLATELET # BLD AUTO: 317 X10E3/UL (ref 150–450)
POTASSIUM SERPL-SCNC: 4.4 MMOL/L (ref 3.5–5.2)
PREALB SERPL-MCNC: 24 MG/DL (ref 14–35)
PROT SERPL-MCNC: 6.6 G/DL (ref 6–8.5)
RBC # BLD AUTO: 4.55 X10E6/UL (ref 3.77–5.28)
SODIUM SERPL-SCNC: 141 MMOL/L (ref 134–144)
VIT B1 BLD-SCNC: 151.3 NMOL/L (ref 66.5–200)
WBC # BLD AUTO: 6.8 X10E3/UL (ref 3.4–10.8)

## 2024-05-03 ENCOUNTER — TELEMEDICINE (OUTPATIENT)
Dept: BARIATRICS/WEIGHT MGMT | Facility: CLINIC | Age: 39
End: 2024-05-03
Payer: MEDICAID

## 2024-05-03 VITALS — WEIGHT: 225 LBS | BODY MASS INDEX: 38.02 KG/M2

## 2024-05-03 DIAGNOSIS — Z90.3 POSTGASTRECTOMY MALABSORPTION: ICD-10-CM

## 2024-05-03 DIAGNOSIS — E55.9 HYPOVITAMINOSIS D: ICD-10-CM

## 2024-05-03 DIAGNOSIS — R53.83 FATIGUE, UNSPECIFIED TYPE: Primary | ICD-10-CM

## 2024-05-03 DIAGNOSIS — Z13.21 MALNUTRITION SCREEN: ICD-10-CM

## 2024-05-03 DIAGNOSIS — Z51.81 THERAPEUTIC DRUG MONITORING: ICD-10-CM

## 2024-05-03 DIAGNOSIS — E66.9 OBESITY, CLASS II, BMI 35-39.9: ICD-10-CM

## 2024-05-03 DIAGNOSIS — Z13.0 SCREENING, IRON DEFICIENCY ANEMIA: ICD-10-CM

## 2024-05-03 DIAGNOSIS — K91.2 POSTGASTRECTOMY MALABSORPTION: ICD-10-CM

## 2024-05-03 NOTE — PROGRESS NOTES
[facility] Bariatric Surgery  [address]  [address]  [facility]ly [facility] [id number] [phone]        Patient Name:  Beverly Giron.  :  1985      Date of Visit: 5/3/2024      Reason for Visit:   1 years postop      HPI: Beverly Giron is a 39 y.o. female s/p LSG/HHR by  on 23, s/p urgent early recurrent paraesophageal hernia repair with falciform ligament wrap with Dr. Canada 23       Weight has stalled.  Has joined gym, but hasn't been yet.  Has gained 15 pounds over past 6 months.  Has mild intermittent reflux for which she takes Tums.    Doing well.  No issues/concerns. Denies dysphagia, nausea, vomiting, and abdominal pain.  Getting ??g prot/day.  Drinking ?? fluid oz/day. Last labs revealed  no vitamin deficiencies. Taking MVI and Vit D.      B: coffee  L: chimichangas or tuna sandwich (previously chips)  D: meat + veg  Snack: Has urge to eat late at night.  Maybe raisin bran or sweets.  Beverages: water, stopped drinking zero sugar drinks.         Presurgery weight: 280 pounds.  Today's weight is 102 kg (225 lb) pounds, today's  Body mass index is 38.02 kg/m²., and weight loss since surgery is 55 pounds.      Past Medical History:   Diagnosis Date    Anxiety and depression     Carpal tunnel syndrome     Elevated LDL cholesterol level     Former smoker     Frequent headaches     Genital herpes     GERD (gastroesophageal reflux disease)     Heart murmur     Heartburn     Tums PRN; EGD Dr. Canada     Hiatal hernia with gastroesophageal reflux     EGD Dr. Canada     History of DVT of lower extremity     following partial hysterectomy- believed to be provoked. Following with heme/onc    History of partial hysterectomy     HPV (human papilloma virus) infection     Hx of eye surgery     Hx of ovarian cyst     Low back pain     no steroids. PRN NSAIDs    Migraines     PRN flexeril and nsaids    Mitral regurgitation     Mild     (normal  spontaneous vaginal delivery)     x 3, 1st two and 4th deliveries.  No complics    Varicose veins of both lower extremities     Venous insufficiency      Past Surgical History:   Procedure Laterality Date     SECTION N/A     x 3. 3rd and last 2 deliveries    ECHO - CONVERTED  2022    EF 60%. Trace -Mild MR. GUTIÉRREZ- 4.2    ENDOSCOPY N/A 2023    Procedure: ESOPHAGOGASTRODUODENOSCOPY;  Surgeon: Jovanny Canada MD;  Location:  [facility];  Service: Bariatric;  Laterality: N/A;  scope     EYE SURGERY Bilateral 1995    GASTRECTOMY N/A 2023    Procedure: LAPAROSCOPIC GASTRECTOMY, HIATAL HERNIA REPAIR LAPAROSCOPIC;  Surgeon: Jovanny Canada MD;  Location:  [facility];  Service: Bariatric;  Laterality: N/A;  hiatal hernia time: [phone]    LAPAROSCOPIC TUBAL LIGATION      PARAESOPHAGEAL HERNIA REPAIR N/A 2023    Procedure: EARLY RECURRENT PARAESOPHAGEAL HERNIA REPAIR LAPAROSCOPIC;  Surgeon: Jovanny Canada MD;  Location:  [facility];  Service: General;  Laterality: N/A;    SUBTOTAL HYSTERECTOMY      laparoscopic; still has ovaries, complicated by left DVT    US VENOUS EXTREMITY UNILATERAL  2022    Normal, no DVT    US VENOUS EXTREMITY UNILATERAL  02/10/2022    Partially occlusive superficial thrombophlebitis greater saphenous vein    US VENOUS EXTREMITY UNILATERAL  2021    Small amount residual thrombus greater saphenous vein    US VENOUS EXTREMITY UNILATERAL  2021    Superficial thrombosis greater saphenous vein    US VENOUS EXTREMITY UNILATERAL  03/10/2021    Occlusive extensive thrombus within the greater saphenous vein from upper calf throughout the thigh    VEIN LIGATION AND STRIPPING Bilateral      Outpatient Medications Marked as Taking for the 5/3/24 encounter (Telemedicine) with Jazmín Muniz MD   Medication Sig Dispense Refill    CRANBERRY PO Take 500 mg by mouth Daily.      cyclobenzaprine (FLEXERIL) 10 MG tablet Take 1 tablet by mouth 2 (Two)  Times a Day As Needed for Muscle Spasms.      docusate sodium (COLACE) 250 MG capsule Take 1 capsule by mouth Daily As Needed for Constipation.      escitalopram (LEXAPRO) 10 MG tablet Take 1 tablet by mouth Daily.      hydrOXYzine pamoate (VISTARIL) 25 MG capsule Take 1 capsule by mouth 3 (Three) Times a Day As Needed.      loratadine (CLARITIN) 10 MG tablet Take 1 tablet by mouth Daily.      metoprolol tartrate (LOPRESSOR) 25 MG tablet Take 1 tablet by mouth 2 (Two) Times a Day.      metroNIDAZOLE (METROGEL) 1 % gel Apply  topically to the appropriate area as directed 2 (Two) Times a Day.      multivitamin with minerals tablet tablet Take 1 tablet by mouth Daily.      Probiotic Product (PROBIOTIC PO) Take  by mouth Daily.      SUMAtriptan (IMITREX) 50 MG tablet Take 1 tablet by mouth Every 2 (Two) Hours As Needed for Migraine. Take one tablet at onset of headache. May repeat dose one time in 2 hours if headache not relieved.      topiramate (TOPAMAX) 25 MG tablet Take 1 tablet by mouth Daily.      tretinoin (RETIN-A) 0.025 % gel Apply  topically to the appropriate area as directed Every Night.      Vitamin D, Cholecalciferol, 50 MCG (2000 UT) capsule Take  by mouth Daily.         Allergies   Allergen Reactions    Morphine Hives and Itching       Social History     Socioeconomic History    Marital status:    Tobacco Use    Smoking status: Former     Current packs/day: 0.00     Average packs/day: 1 pack/day for 10.0 years (10.0 ttl pk-yrs)     Types: Cigarettes     Start date: 2012     Quit date: 2022     Years since quittin.0    Smokeless tobacco: Never    Tobacco comments:     she had stopped in  for a few years then restarted 10/2021. Has stopped again 2022.   Vaping Use    Vaping status: Never Used   Substance and Sexual Activity    Alcohol use: Not Currently     Comment: occasionally    Drug use: Never    Sexual activity: Yes     Partners: Male     Birth control/protection: Tubal  ligation, Hysterectomy, Same-sex partner       Wt 102 kg (225 lb)   BMI 38.02 kg/m²     Physical Exam  Constitutional:       General: She is not in acute distress.     Appearance: Normal appearance. She is not ill-appearing.   HENT:      Head: Normocephalic and atraumatic.      Nose: Nose normal.   Eyes:      General: No scleral icterus.     Extraocular Movements: Extraocular movements intact.      Conjunctiva/sclera: Conjunctivae normal.      Pupils: Pupils are equal, round, and reactive to light.   Pulmonary:      Effort: Pulmonary effort is normal. No respiratory distress.   Skin:     Coloration: Skin is not pale.   Neurological:      Mental Status: She is alert and oriented to person, place, and time.   Psychiatric:         Mood and Affect: Mood normal.         Behavior: Behavior normal.           Assessment:  1 years s/p LSG/ HHR by  on 5/4/23, s/p urgent early recurrent paraesophageal hernia repair with falciform ligament wrap with Dr. Canada 5/8/23         ICD-10-CM ICD-9-CM   1. Fatigue, unspecified type  R53.83 780.79   2. Therapeutic drug monitoring  Z51.81 V58.83   3. Hypovitaminosis D  E55.9 268.9   4. Postgastrectomy malabsorption  K91.2 579.3    Z90.3    5. Obesity, Class II, BMI 35-39.9  E66.9 278.00   6. Screening, iron deficiency anemia  Z13.0 V78.0   7. Malnutrition screen  Z13.21 V77.2       Plan:  Doing well. Continue w/ good food choices and healthy habits.  Continue protein >70g/day.  Continue routine exercise.  Routine bariatric labs ordered.  Continue vitamins w/ adjustments pending lab results.  Call w/ problems/concerns.     Start tracking on [employer].  Start working out.  She wants to pursue  at her gym.  Will ask Deborah WILLSON to call her also.  Add protein shake at breakfast.  Try not to snack.      F/u in 1 month for weight recheck, possible medicine.    The patient was instructed to follow up in 6 months, sooner if needed.    You have chosen to receive care through  a telehealth visit.  Do you consent to use a video/audio connection for your medical care today? Yes    Jazmín Muniz MD

## 2024-05-15 ENCOUNTER — TELEPHONE (OUTPATIENT)
Dept: BARIATRICS/WEIGHT MGMT | Facility: CLINIC | Age: 39
End: 2024-05-15
Payer: MEDICAID

## 2024-05-15 NOTE — TELEPHONE ENCOUNTER
Called pt post op bariatrics, LOV with me Nov 2023  Pt reports since started tracking in [employer] she is meeting protein and carb goals now  Focusing on going to the gym next as has a gym membership  Pt has no q at this time

## 2024-06-26 ENCOUNTER — OFFICE VISIT (OUTPATIENT)
Dept: BARIATRICS/WEIGHT MGMT | Facility: CLINIC | Age: 39
End: 2024-06-26
Payer: MEDICAID

## 2024-06-26 VITALS
RESPIRATION RATE: 16 BRPM | WEIGHT: 224 LBS | HEART RATE: 83 BPM | HEIGHT: 65 IN | TEMPERATURE: 97.5 F | SYSTOLIC BLOOD PRESSURE: 118 MMHG | BODY MASS INDEX: 37.32 KG/M2 | DIASTOLIC BLOOD PRESSURE: 74 MMHG | OXYGEN SATURATION: 98 %

## 2024-06-26 DIAGNOSIS — F32.A ANXIETY AND DEPRESSION: ICD-10-CM

## 2024-06-26 DIAGNOSIS — R53.83 FATIGUE, UNSPECIFIED TYPE: ICD-10-CM

## 2024-06-26 DIAGNOSIS — Z13.0 SCREENING, IRON DEFICIENCY ANEMIA: ICD-10-CM

## 2024-06-26 DIAGNOSIS — Z90.3 POSTGASTRECTOMY MALABSORPTION: ICD-10-CM

## 2024-06-26 DIAGNOSIS — E55.9 HYPOVITAMINOSIS D: ICD-10-CM

## 2024-06-26 DIAGNOSIS — Z13.21 MALNUTRITION SCREEN: ICD-10-CM

## 2024-06-26 DIAGNOSIS — E66.9 OBESITY, CLASS II, BMI 35-39.9: Primary | ICD-10-CM

## 2024-06-26 DIAGNOSIS — F41.9 ANXIETY AND DEPRESSION: ICD-10-CM

## 2024-06-26 DIAGNOSIS — K91.2 POSTGASTRECTOMY MALABSORPTION: ICD-10-CM

## 2024-06-26 DIAGNOSIS — Z51.81 THERAPEUTIC DRUG MONITORING: ICD-10-CM

## 2024-06-26 PROBLEM — E66.812 OBESITY, CLASS II, BMI 35-39.9: Status: ACTIVE | Noted: 2023-04-18

## 2024-06-26 PROCEDURE — 1159F MED LIST DOCD IN RCRD: CPT | Performed by: SURGERY

## 2024-06-26 PROCEDURE — 99214 OFFICE O/P EST MOD 30 MIN: CPT | Performed by: SURGERY

## 2024-06-26 PROCEDURE — 1160F RVW MEDS BY RX/DR IN RCRD: CPT | Performed by: SURGERY

## 2024-06-26 RX ORDER — NALTREXONE HYDROCHLORIDE 50 MG/1
TABLET, FILM COATED ORAL
Qty: 30 TABLET | Refills: 2 | Status: SHIPPED | OUTPATIENT
Start: 2024-06-26 | End: 2024-07-26

## 2024-06-26 RX ORDER — BUPROPION HYDROCHLORIDE 75 MG/1
TABLET ORAL
Qty: 60 TABLET | Refills: 0 | Status: SHIPPED | OUTPATIENT
Start: 2024-06-26 | End: 2024-07-26

## 2024-06-26 NOTE — PROGRESS NOTES
[facility] Bariatric Surgery  [address]  [address]  [facility] [id number] [phone]      Patient Name:  Beverly Giron.  :  1985      Date of Visit: 2024      Reason for Visit: weight recheck    HPI:  Beverly Giron is a 39 y.o. female s/p  LSG/HHR by  on 23, s/p urgent early recurrent paraesophageal hernia repair with falciform ligament wrap with Dr. Canada 23     Has Miralax, doesn't use. C/o constipation.  Taking probably Colace over the counter.  Frustrated she has gaind from her jordin of 209.  Still has restriction, not as good of choices.  C/o cravings; craves chocolate.    Tracked a little bit on Baritastic, lose a few pounds doing this and utilizing meal planning.    No hunger till nighttime.  Then poor choices.    24 hour diet recall today so far:    Cup of coffee  Popcorn  Chewing gum    Has added 6th child to her family, works at home full time.  Increased stress.  She is the only one in the family trying to eat healthily.  Hard to make herself a priority.      +Cravings, + food is a reward.  Thinks she sometimes uses food instead of cigarettes.      She is trying to focus on eating meat first instead of eating sides first.  She has noticed less hair loss since doing this.        Pancreatitis: no  Glaucoma: no  Headaches: on Topamax for migraines  HTN: no  Heart palpitations: no  Thyroid C cell cancer: no  MEN syndrome personal or family hx: no  Nephrolithiasis: no    Tolerating Topamax well.            Presurgery weight:  280 pounds. Today's weight is 102 kg (224 lb) pounds, today's Body mass index is 37.86 kg/m²., and   weight loss since surgery is 26 pounds.     -1 lb since LOV.    Past Medical History:   Diagnosis Date    Anxiety and depression     Carpal tunnel syndrome     Elevated LDL cholesterol level     Former smoker     Frequent headaches     Genital herpes     GERD (gastroesophageal reflux disease)     Heart murmur      Heartburn     Tums PRN; EGD Dr. Canada     Hiatal hernia with gastroesophageal reflux     EGD Dr. Canada     History of DVT of lower extremity     following partial hysterectomy- believed to be provoked. Following with heme/onc    History of partial hysterectomy     HPV (human papilloma virus) infection     Hx of eye surgery     Hx of ovarian cyst     Low back pain     no steroids. PRN NSAIDs    Migraines     PRN flexeril and nsaids    Mitral regurgitation     Mild     (normal spontaneous vaginal delivery)     x 3, 1st two and 4th deliveries.  No complics    Varicose veins of both lower extremities     Venous insufficiency      Past Surgical History:   Procedure Laterality Date     SECTION N/A     x 3. 3rd and last 2 deliveries    ECHO - CONVERTED  2022    EF 60%. Trace -Mild MR. LA- 4.2    ENDOSCOPY N/A 2023    Procedure: ESOPHAGOGASTRODUODENOSCOPY;  Surgeon: Jovanny Caanda MD;  Location:  [facility];  Service: Bariatric;  Laterality: N/A;  scope     EYE SURGERY Bilateral 1995    GASTRECTOMY N/A 2023    Procedure: LAPAROSCOPIC GASTRECTOMY, HIATAL HERNIA REPAIR LAPAROSCOPIC;  Surgeon: Jovanny Canada MD;  Location:  [facility];  Service: Bariatric;  Laterality: N/A;  hiatal hernia time: [phone]    LAPAROSCOPIC TUBAL LIGATION      PARAESOPHAGEAL HERNIA REPAIR N/A 2023    Procedure: EARLY RECURRENT PARAESOPHAGEAL HERNIA REPAIR LAPAROSCOPIC;  Surgeon: Jovanny Canada MD;  Location:  [facility];  Service: General;  Laterality: N/A;    SUBTOTAL HYSTERECTOMY      laparoscopic; still has ovaries, complicated by left DVT    US VENOUS EXTREMITY UNILATERAL  2022    Normal, no DVT    US VENOUS EXTREMITY UNILATERAL  02/10/2022    Partially occlusive superficial thrombophlebitis greater saphenous vein    US VENOUS EXTREMITY UNILATERAL  2021    Small amount residual thrombus greater saphenous vein    US VENOUS EXTREMITY UNILATERAL  2021    Superficial  thrombosis greater saphenous vein    US VENOUS EXTREMITY UNILATERAL  03/10/2021    Occlusive extensive thrombus within the greater saphenous vein from upper calf throughout the thigh    VEIN LIGATION AND STRIPPING Bilateral      Outpatient Medications Marked as Taking for the 6/26/24 encounter (Office Visit) with Jazmín Muniz MD   Medication Sig Dispense Refill    CRANBERRY PO Take 500 mg by mouth Daily.      cyclobenzaprine (FLEXERIL) 10 MG tablet Take 1 tablet by mouth 2 (Two) Times a Day As Needed for Muscle Spasms.      docusate sodium (COLACE) 250 MG capsule Take 1 capsule by mouth Daily As Needed for Constipation.      escitalopram (LEXAPRO) 10 MG tablet Take 1 tablet by mouth Daily.      hydrOXYzine pamoate (VISTARIL) 25 MG capsule Take 1 capsule by mouth 3 (Three) Times a Day As Needed.      loratadine (CLARITIN) 10 MG tablet Take 1 tablet by mouth Daily.      metoprolol tartrate (LOPRESSOR) 25 MG tablet Take 1 tablet by mouth 2 (Two) Times a Day.      metroNIDAZOLE (METROGEL) 1 % gel Apply  topically to the appropriate area as directed 2 (Two) Times a Day.      multivitamin with minerals tablet tablet Take 1 tablet by mouth Daily.      Probiotic Product (PROBIOTIC PO) Take  by mouth Daily.      SUMAtriptan (IMITREX) 50 MG tablet Take 1 tablet by mouth Every 2 (Two) Hours As Needed for Migraine. Take one tablet at onset of headache. May repeat dose one time in 2 hours if headache not relieved.      topiramate (TOPAMAX) 25 MG tablet Take 1 tablet by mouth Daily.      tretinoin (RETIN-A) 0.025 % gel Apply  topically to the appropriate area as directed Every Night.      Vitamin D, Cholecalciferol, 50 MCG (2000 UT) capsule Take  by mouth Daily.       Allergies   Allergen Reactions    Morphine Hives and Itching       Social History     Socioeconomic History    Marital status:    Tobacco Use    Smoking status: Former     Current packs/day: 0.00     Average packs/day: 1 pack/day for 10.0 years  "(10.0 ttl pk-yrs)     Types: Cigarettes     Start date: 2012     Quit date: 2022     Years since quittin.2    Smokeless tobacco: Never    Tobacco comments:     she had stopped in 2017 for a few years then restarted 10/2021. Has stopped again 2022.   Vaping Use    Vaping status: Never Used   Substance and Sexual Activity    Alcohol use: Not Currently     Comment: occasionally    Drug use: Never    Sexual activity: Yes     Partners: Male     Birth control/protection: Tubal ligation, Hysterectomy, Same-sex partner       /74 (BP Location: Right arm, Patient Position: Sitting)   Pulse 83   Temp 97.5 °F (36.4 °C)   Resp 16   Ht 163.8 cm (64.5\")   Wt 102 kg (224 lb)   SpO2 98%   BMI 37.86 kg/m²     Physical Exam  Constitutional:       General: She is not in acute distress.     Appearance: She is well-developed. She is not diaphoretic.   HENT:      Head: Normocephalic and atraumatic.      Mouth/Throat:      Pharynx: No oropharyngeal exudate.   Eyes:      Conjunctiva/sclera: Conjunctivae normal.      Pupils: Pupils are equal, round, and reactive to light.   Pulmonary:      Effort: Pulmonary effort is normal. No respiratory distress.   Abdominal:      General: There is no distension.      Palpations: Abdomen is soft.   Skin:     General: Skin is warm and dry.      Coloration: Skin is not pale.   Neurological:      Mental Status: She is alert and oriented to person, place, and time.      Cranial Nerves: No cranial nerve deficit.   Psychiatric:         Behavior: Behavior normal.         Thought Content: Thought content normal.           Assessment:  1 year s/p  LSG/HHR by  on 23, s/p urgent early recurrent paraesophageal hernia repair with falciform ligament wrap with Dr. Canada 23              ICD-10-CM ICD-9-CM   1. Obesity, Class II, BMI 35-39.9  E66.9 278.00   2. Fatigue, unspecified type  R53.83 780.79   3. Therapeutic drug monitoring  Z51.81 V58.83   4. Hypovitaminosis D  E55.9 " 268.9   5. Screening, iron deficiency anemia  Z13.0 V78.0   6. Postgastrectomy malabsorption  K91.2 579.3    Z90.3    7. Malnutrition screen  Z13.21 V77.2   8. Anxiety and depression  F41.9 300.00    F32.A 311       Plan:    Patient's (Body mass index is 37.86 kg/m².) indicates that they are morbidly/severely obese (BMI > 40 or > 35 with obesity - related health condition) with health conditions that include none . Weight is improving with lifestyle modifications. BMI  is above average; BMI management plan is completed. We discussed portion control and increasing exercise.      I have instructed the patient to continue with pursuit of medical weight loss as a part of this program. Patient does meet criteria for use of anorectics at this time as BMI >30 , body fat percentage >30%, and this medication is indicated for LONG TERM use for management of obesity.      The current plan for this month includes:   - Adjust exercise as discussed  - Weight loss goal 4-6lbs this month  - Continue to work on lifestyle behavioral changes  - Continue nutrition focus  - Adjust macronutrients as discussed. Bring food journal to next visit for review  - Continue to prioritize protein, fiber, and hydration.      R/b/a discussed for bupropion/naltrexone.   Track diligently, get diet and tracking on track this month before back to gym.      I spent 30 minutes caring for Beverly on this date of service. This time includes time spent by me in the following activities: preparing for the visit, reviewing tests, performing a medically appropriate examination and/or evaluation, counseling and educating the patient/family/caregiver, documenting information in the medical record, independently interpreting results and communicating that information with the patient/family/caregiver, care coordination, ordering medications, ordering test(s), ordering procedure(s), obtaining a separately obtained history, and reviewing a separately obtained  history      The patient was instructed to follow up in 6 months, sooner if needed.     Jazmín Muniz MD

## 2024-06-26 NOTE — ASSESSMENT & PLAN NOTE
Patient's (Body mass index is 37.86 kg/m².) indicates that they are morbidly/severely obese (BMI > 40 or > 35 with obesity - related health condition) with health conditions that include none . Weight is improving with lifestyle modifications. BMI  is above average; BMI management plan is completed. We discussed portion control and increasing exercise.     I have instructed the patient to continue with pursuit of medical weight loss as a part of this program. Patient does meet criteria for use of anorectics at this time as BMI >30 , body fat percentage >30%, and this medication is indicated for LONG TERM use for management of obesity.     The current plan for this month includes:   - Adjust exercise as discussed  - Weight loss goal 4-6lbs this month  - Continue to work on lifestyle behavioral changes  - Continue nutrition focus  - Adjust macronutrients as discussed. Bring food journal to next visit for review  - Continue to prioritize protein, fiber, and hydration.     R/b/a discussed for bupropion/naltrexone.

## 2024-07-25 RX ORDER — BUPROPION HYDROCHLORIDE 75 MG/1
TABLET ORAL
Qty: 60 TABLET | Refills: 0 | Status: SHIPPED | OUTPATIENT
Start: 2024-07-25

## 2024-07-31 ENCOUNTER — TELEMEDICINE (OUTPATIENT)
Dept: BARIATRICS/WEIGHT MGMT | Facility: CLINIC | Age: 39
End: 2024-07-31
Payer: MEDICAID

## 2024-07-31 VITALS — HEIGHT: 65 IN | WEIGHT: 224 LBS | BODY MASS INDEX: 37.32 KG/M2

## 2024-07-31 DIAGNOSIS — E66.9 OBESITY, CLASS II, BMI 35-39.9: Primary | ICD-10-CM

## 2024-07-31 DIAGNOSIS — Z13.21 ENCOUNTER FOR VITAMIN DEFICIENCY SCREENING: ICD-10-CM

## 2024-07-31 DIAGNOSIS — Z71.3 ENCOUNTER FOR WEIGHT LOSS COUNSELING: ICD-10-CM

## 2024-07-31 DIAGNOSIS — Z51.81 THERAPEUTIC DRUG MONITORING: ICD-10-CM

## 2024-07-31 RX ORDER — NALTREXONE HYDROCHLORIDE 50 MG/1
TABLET, FILM COATED ORAL
COMMUNITY
Start: 2024-07-28

## 2024-07-31 RX ORDER — TOPIRAMATE 50 MG/1
TABLET, FILM COATED ORAL
COMMUNITY
Start: 2024-07-20

## 2024-07-31 NOTE — PROGRESS NOTES
"[facility] Bariatric Surgery  [address]  [address]  [facility] [id number] [phone]      Patient Name:  Beverly Giron.  :  1985      Date of Visit: 2024      Reason for Visit:  MWM followup       HPI:  Beverly Giron is a 39 y.o. female s/p LSG/HHR 23 w/ , s/p urgent early recurrent paraesophageal hernia repair with falciform ligament wrap 23 w/ Dr. Canada     LOV 24 w/ Dr. Muniz - \"Frustrated she has gaind from her jordni of 209.  Still has restriction, not as good of choices, craves chocolate.\"    Update 24:  Started Wellbutrin + Naltrexone after last visit.  Tolerating w/out issue.  Says less night-time snacking, less cravings, but weight unchanged.      Tracking food semi-consistently.  Eating 2-3 meals more consistently each day.      Not routinely exercising, struggles to find the time, but does have a gym membership.     Last labs 24 - WNL.  Taking MVI + Vit D 2000 daily.         Presurgery weight:  280 pounds.  Lowest weight:  209 lbs.  Today's weight is 102 kg (224 lb) pounds, today's Body mass index is 37.86 kg/m²., and weight loss since surgery is 26 pounds.      ----     Pancreatitis: no  Glaucoma: no  Headaches: on Topamax for migraines  HTN: no  Heart palpitations: no  Thyroid C cell cancer: no  MEN syndrome personal or family hx: no  Nephrolithiasis: no    Past Medical History:   Diagnosis Date    Anxiety and depression     Carpal tunnel syndrome     Elevated LDL cholesterol level     Former smoker     Frequent headaches     Genital herpes     GERD (gastroesophageal reflux disease)     Heart murmur     Heartburn     Tums PRN; EGD Dr. Canada     Hiatal hernia with gastroesophageal reflux     EGD Dr. Canada     History of DVT of lower extremity     following partial hysterectomy- believed to be provoked. Following with heme/onc    History of partial hysterectomy     HPV (human papilloma virus) " infection     Hx of eye surgery     Hx of ovarian cyst     Low back pain     no steroids. PRN NSAIDs    Migraines     PRN flexeril and nsaids    Mitral regurgitation     Mild     (normal spontaneous vaginal delivery)     x 3, 1st two and 4th deliveries.  No complics    Varicose veins of both lower extremities     Venous insufficiency      Past Surgical History:   Procedure Laterality Date     SECTION N/A     x 3. 3rd and last 2 deliveries    ECHO - CONVERTED  2022    EF 60%. Trace -Mild MR. LA- 4.2    ENDOSCOPY N/A 2023    Procedure: ESOPHAGOGASTRODUODENOSCOPY;  Surgeon: Jovanny Canada MD;  Location:  [facility];  Service: Bariatric;  Laterality: N/A;  scope     EYE SURGERY Bilateral 1995    GASTRECTOMY N/A 2023    Procedure: LAPAROSCOPIC GASTRECTOMY, HIATAL HERNIA REPAIR LAPAROSCOPIC;  Surgeon: Jovanny Canada MD;  Location:  [facility];  Service: Bariatric;  Laterality: N/A;  hiatal hernia time: [phone]    LAPAROSCOPIC TUBAL LIGATION      PARAESOPHAGEAL HERNIA REPAIR N/A 2023    Procedure: EARLY RECURRENT PARAESOPHAGEAL HERNIA REPAIR LAPAROSCOPIC;  Surgeon: Jovanny Canada MD;  Location:  [facility];  Service: General;  Laterality: N/A;    SUBTOTAL HYSTERECTOMY      laparoscopic; still has ovaries, complicated by left DVT    US VENOUS EXTREMITY UNILATERAL  2022    Normal, no DVT    US VENOUS EXTREMITY UNILATERAL  02/10/2022    Partially occlusive superficial thrombophlebitis greater saphenous vein    US VENOUS EXTREMITY UNILATERAL  2021    Small amount residual thrombus greater saphenous vein    US VENOUS EXTREMITY UNILATERAL  2021    Superficial thrombosis greater saphenous vein    US VENOUS EXTREMITY UNILATERAL  03/10/2021    Occlusive extensive thrombus within the greater saphenous vein from upper calf throughout the thigh    VEIN LIGATION AND STRIPPING Bilateral      Outpatient Medications Marked as Taking for the 24 encounter  (Telemedicine) with Christina Villafana PA   Medication Sig Dispense Refill    buPROPion (WELLBUTRIN) 75 MG tablet TAKE ONE TABLET BY MOUTH DAILY FOR 7 DAYS, THEN TAKE ONE TABLET BY MOUTH TWO TIMES A DAY 60 tablet 0    CRANBERRY PO Take 500 mg by mouth Daily.      cyclobenzaprine (FLEXERIL) 10 MG tablet Take 1 tablet by mouth 2 (Two) Times a Day As Needed for Muscle Spasms.      docusate sodium (COLACE) 250 MG capsule Take 1 capsule by mouth Daily As Needed for Constipation.      escitalopram (LEXAPRO) 10 MG tablet Take 1 tablet by mouth Daily.      hydrOXYzine pamoate (VISTARIL) 25 MG capsule Take 1 capsule by mouth 3 (Three) Times a Day As Needed.      loratadine (CLARITIN) 10 MG tablet Take 1 tablet by mouth Daily.      metoprolol tartrate (LOPRESSOR) 25 MG tablet Take 1 tablet by mouth 2 (Two) Times a Day.      metroNIDAZOLE (METROGEL) 1 % gel Apply  topically to the appropriate area as directed 2 (Two) Times a Day.      multivitamin with minerals tablet tablet Take 1 tablet by mouth Daily.      naltrexone (DEPADE) 50 MG tablet       Probiotic Product (PROBIOTIC PO) Take  by mouth Daily.      SUMAtriptan (IMITREX) 50 MG tablet Take 1 tablet by mouth Every 2 (Two) Hours As Needed for Migraine. Take one tablet at onset of headache. May repeat dose one time in 2 hours if headache not relieved.      topiramate (TOPAMAX) 50 MG tablet       tretinoin (RETIN-A) 0.025 % gel Apply  topically to the appropriate area as directed Every Night.      Vitamin D, Cholecalciferol, 50 MCG (2000 UT) capsule Take  by mouth Daily.       Allergies   Allergen Reactions    Morphine Hives and Itching       Social History     Socioeconomic History    Marital status:    Tobacco Use    Smoking status: Former     Current packs/day: 0.00     Average packs/day: 1 pack/day for 10.0 years (10.0 ttl pk-yrs)     Types: Cigarettes     Start date: 2012     Quit date: 2022     Years since quittin.3    Smokeless tobacco: Never     "Tobacco comments:     she had stopped in 2017 for a few years then restarted 10/2021. Has stopped again 4/2022.   Vaping Use    Vaping status: Never Used   Substance and Sexual Activity    Alcohol use: Not Currently     Comment: occasionally    Drug use: Never    Sexual activity: Yes     Partners: Male     Birth control/protection: Tubal ligation, Hysterectomy, Same-sex partner       Ht 163.8 cm (64.5\")   Wt 102 kg (224 lb)   BMI 37.86 kg/m²     Physical Exam  Constitutional:       General: She is not in acute distress.     Appearance: She is well-developed.   Eyes:      General: No scleral icterus.  Pulmonary:      Effort: Pulmonary effort is normal.   Neurological:      Mental Status: She is alert and oriented to person, place, and time.           Assessment:  1 year s/p LSG/HHR 5/4/23 w/ , s/p urgent early recurrent paraesophageal hernia repair with falciform ligament wrap 5/8/23 w/ Dr. Canada         ICD-10-CM ICD-9-CM   1. Obesity, Class II, BMI 35-39.9  E66.9 278.00   2. Encounter for weight loss counseling  Z71.3 V65.3   3. Encounter for vitamin deficiency screening  Z13.21 V77.99       Class 2 Severe Obesity (BMI >=35 and <=39.9). Obesity-related health conditions include the following:  see above . Obesity is unchanged. BMI is is above average; BMI management plan is completed. We discussed  see plan .      Plan:  I have instructed the patient to continue with pursuit of medical weight loss as a part of this program. Patient does meet criteria for use of anorectics at this time as BMI >30 , body fat percentage >30%, and this medication is indicated for LONG TERM use for management of obesity.     The current plan for this month includes:   - Continue to work on lifestyle behavioral changes.  - Continue nutrition focus.   - Increase protein to 100g/day.  - Increase routine exercise w/ strength training.  - Labs + UDS ordered.   - Continue Topamax, Wellbutrin, Naltrexone.  Consider adding " Phentermine.      She understands that she must remain committed to healthy habits.        The patient was instructed to follow up in 6 weeks, sooner if needed.       TIM Gutierrez    Note: This was an audio and video enabled telemedicine encounter conducted via [employer].  Patient is located in [place].  Provider is at the office. Consent was obtained prior to the visit.

## 2024-08-01 ENCOUNTER — LAB (OUTPATIENT)
Dept: LAB | Facility: HOSPITAL | Age: 39
End: 2024-08-01
Payer: MEDICAID

## 2024-08-01 DIAGNOSIS — Z51.81 THERAPEUTIC DRUG MONITORING: ICD-10-CM

## 2024-08-01 DIAGNOSIS — Z13.21 MALNUTRITION SCREEN: ICD-10-CM

## 2024-08-01 DIAGNOSIS — E66.9 OBESITY, CLASS II, BMI 35-39.9: ICD-10-CM

## 2024-08-01 DIAGNOSIS — Z90.3 POSTGASTRECTOMY MALABSORPTION: ICD-10-CM

## 2024-08-01 DIAGNOSIS — Z13.0 SCREENING, IRON DEFICIENCY ANEMIA: ICD-10-CM

## 2024-08-01 DIAGNOSIS — E55.9 HYPOVITAMINOSIS D: ICD-10-CM

## 2024-08-01 DIAGNOSIS — R53.83 FATIGUE, UNSPECIFIED TYPE: ICD-10-CM

## 2024-08-01 DIAGNOSIS — K91.2 POSTGASTRECTOMY MALABSORPTION: ICD-10-CM

## 2024-08-01 LAB
ALBUMIN SERPL-MCNC: 4.1 G/DL (ref 3.5–5.2)
ALBUMIN/GLOB SERPL: 1.6 G/DL
ALP SERPL-CCNC: 57 U/L (ref 39–117)
ALT SERPL W P-5'-P-CCNC: 9 U/L (ref 1–33)
AMPHET+METHAMPHET UR QL: NEGATIVE
AMPHETAMINES UR QL: NEGATIVE
ANION GAP SERPL CALCULATED.3IONS-SCNC: 12.6 MMOL/L (ref 5–15)
AST SERPL-CCNC: 13 U/L (ref 1–32)
BARBITURATES UR QL SCN: NEGATIVE
BASOPHILS # BLD AUTO: 0.04 10*3/MM3 (ref 0–0.2)
BASOPHILS NFR BLD AUTO: 0.6 % (ref 0–1.5)
BENZODIAZ UR QL SCN: NEGATIVE
BILIRUB SERPL-MCNC: 0.2 MG/DL (ref 0–1.2)
BUN SERPL-MCNC: 11 MG/DL (ref 6–20)
BUN/CREAT SERPL: 12.1 (ref 7–25)
BUPRENORPHINE SERPL-MCNC: NEGATIVE NG/ML
CALCIUM SPEC-SCNC: 8.8 MG/DL (ref 8.6–10.5)
CANNABINOIDS SERPL QL: NEGATIVE
CHLORIDE SERPL-SCNC: 109 MMOL/L (ref 98–107)
CO2 SERPL-SCNC: 22.4 MMOL/L (ref 22–29)
COCAINE UR QL: NEGATIVE
CREAT SERPL-MCNC: 0.91 MG/DL (ref 0.57–1)
DEPRECATED RDW RBC AUTO: 43.8 FL (ref 37–54)
EGFRCR SERPLBLD CKD-EPI 2021: 83.5 ML/MIN/1.73
EOSINOPHIL # BLD AUTO: 0.08 10*3/MM3 (ref 0–0.4)
EOSINOPHIL NFR BLD AUTO: 1.1 % (ref 0.3–6.2)
ERYTHROCYTE [DISTWIDTH] IN BLOOD BY AUTOMATED COUNT: 12.9 % (ref 12.3–15.4)
FENTANYL UR-MCNC: NEGATIVE NG/ML
FERRITIN SERPL-MCNC: 115.6 NG/ML (ref 13–150)
GLOBULIN UR ELPH-MCNC: 2.6 GM/DL
GLUCOSE SERPL-MCNC: 117 MG/DL (ref 65–99)
HCT VFR BLD AUTO: 42 % (ref 34–46.6)
HGB BLD-MCNC: 13.9 G/DL (ref 12–15.9)
IMM GRANULOCYTES # BLD AUTO: 0.02 10*3/MM3 (ref 0–0.05)
IMM GRANULOCYTES NFR BLD AUTO: 0.3 % (ref 0–0.5)
IRON 24H UR-MRATE: 44 MCG/DL (ref 37–145)
LYMPHOCYTES # BLD AUTO: 1.79 10*3/MM3 (ref 0.7–3.1)
LYMPHOCYTES NFR BLD AUTO: 25.6 % (ref 19.6–45.3)
MCH RBC QN AUTO: 30.5 PG (ref 26.6–33)
MCHC RBC AUTO-ENTMCNC: 33.1 G/DL (ref 31.5–35.7)
MCV RBC AUTO: 92.1 FL (ref 79–97)
METHADONE UR QL SCN: NEGATIVE
MONOCYTES # BLD AUTO: 0.31 10*3/MM3 (ref 0.1–0.9)
MONOCYTES NFR BLD AUTO: 4.4 % (ref 5–12)
NEUTROPHILS NFR BLD AUTO: 4.76 10*3/MM3 (ref 1.7–7)
NEUTROPHILS NFR BLD AUTO: 68 % (ref 42.7–76)
NRBC BLD AUTO-RTO: 0 /100 WBC (ref 0–0.2)
OPIATES UR QL: NEGATIVE
OXYCODONE UR QL SCN: NEGATIVE
PCP UR QL SCN: NEGATIVE
PLATELET # BLD AUTO: 338 10*3/MM3 (ref 140–450)
PMV BLD AUTO: 9.4 FL (ref 6–12)
POTASSIUM SERPL-SCNC: 3.8 MMOL/L (ref 3.5–5.2)
PROT SERPL-MCNC: 6.7 G/DL (ref 6–8.5)
RBC # BLD AUTO: 4.56 10*6/MM3 (ref 3.77–5.28)
SODIUM SERPL-SCNC: 144 MMOL/L (ref 136–145)
TRICYCLICS UR QL SCN: NEGATIVE
WBC NRBC COR # BLD AUTO: 7 10*3/MM3 (ref 3.4–10.8)

## 2024-08-01 PROCEDURE — 84425 ASSAY OF VITAMIN B-1: CPT

## 2024-08-01 PROCEDURE — 85025 COMPLETE CBC W/AUTO DIFF WBC: CPT

## 2024-08-01 PROCEDURE — 83540 ASSAY OF IRON: CPT

## 2024-08-01 PROCEDURE — 82728 ASSAY OF FERRITIN: CPT

## 2024-08-01 PROCEDURE — 80053 COMPREHEN METABOLIC PANEL: CPT

## 2024-08-01 PROCEDURE — 80307 DRUG TEST PRSMV CHEM ANLYZR: CPT

## 2024-08-01 PROCEDURE — 82306 VITAMIN D 25 HYDROXY: CPT

## 2024-08-01 PROCEDURE — 82746 ASSAY OF FOLIC ACID SERUM: CPT

## 2024-08-01 PROCEDURE — 83921 ORGANIC ACID SINGLE QUANT: CPT

## 2024-08-01 PROCEDURE — 36415 COLL VENOUS BLD VENIPUNCTURE: CPT

## 2024-08-01 PROCEDURE — 84134 ASSAY OF PREALBUMIN: CPT

## 2024-08-02 LAB
25(OH)D3 SERPL-MCNC: 48.1 NG/ML (ref 30–100)
FOLATE SERPL-MCNC: 15.1 NG/ML (ref 4.78–24.2)
PREALB SERPL-MCNC: 25.4 MG/DL (ref 20–40)

## 2024-08-06 LAB — VIT B1 BLD-SCNC: 175.5 NMOL/L (ref 66.5–200)

## 2024-08-09 LAB — METHYLMALONATE SERPL-SCNC: 183 NMOL/L (ref 0–378)

## 2024-08-26 RX ORDER — NALTREXONE HYDROCHLORIDE 50 MG/1
TABLET, FILM COATED ORAL
Qty: 30 TABLET | Refills: 3 | Status: SHIPPED | OUTPATIENT
Start: 2024-08-26

## 2024-08-26 RX ORDER — BUPROPION HYDROCHLORIDE 75 MG/1
TABLET ORAL
Qty: 60 TABLET | Refills: 0 | Status: SHIPPED | OUTPATIENT
Start: 2024-08-26

## 2024-09-18 ENCOUNTER — TELEMEDICINE (OUTPATIENT)
Dept: BARIATRICS/WEIGHT MGMT | Facility: CLINIC | Age: 39
End: 2024-09-18
Payer: MEDICAID

## 2024-09-18 VITALS — WEIGHT: 214 LBS | BODY MASS INDEX: 36.17 KG/M2

## 2024-09-18 DIAGNOSIS — Z71.3 ENCOUNTER FOR WEIGHT LOSS COUNSELING: ICD-10-CM

## 2024-09-18 DIAGNOSIS — E66.9 OBESITY, CLASS II, BMI 35-39.9: Primary | ICD-10-CM

## 2024-09-18 DIAGNOSIS — K59.09 OTHER CONSTIPATION: ICD-10-CM

## 2024-09-18 PROCEDURE — 1159F MED LIST DOCD IN RCRD: CPT | Performed by: SURGERY

## 2024-09-18 PROCEDURE — 1160F RVW MEDS BY RX/DR IN RCRD: CPT | Performed by: SURGERY

## 2024-09-18 PROCEDURE — 99214 OFFICE O/P EST MOD 30 MIN: CPT | Performed by: SURGERY

## 2024-09-18 RX ORDER — BUPROPION HYDROCHLORIDE 150 MG/1
150 TABLET, EXTENDED RELEASE ORAL 2 TIMES DAILY
Qty: 60 TABLET | Refills: 2 | Status: SHIPPED | OUTPATIENT
Start: 2024-09-18

## 2024-09-18 RX ORDER — PHENTERMINE HYDROCHLORIDE 37.5 MG/1
37.5 TABLET ORAL
Qty: 28 TABLET | Refills: 0 | Status: SHIPPED | OUTPATIENT
Start: 2024-09-18

## 2024-10-14 DIAGNOSIS — E66.812 OBESITY, CLASS II, BMI 35-39.9: ICD-10-CM

## 2024-10-24 RX ORDER — PHENTERMINE HYDROCHLORIDE 37.5 MG/1
37.5 TABLET ORAL
Qty: 28 TABLET | OUTPATIENT
Start: 2024-10-24

## 2024-10-25 DIAGNOSIS — E66.812 OBESITY, CLASS II, BMI 35-39.9: ICD-10-CM

## 2024-10-25 RX ORDER — PHENTERMINE HYDROCHLORIDE 37.5 MG/1
37.5 TABLET ORAL
Qty: 28 TABLET | Refills: 0 | Status: CANCELLED | OUTPATIENT
Start: 2024-10-25

## 2024-10-28 ENCOUNTER — OFFICE VISIT (OUTPATIENT)
Dept: BARIATRICS/WEIGHT MGMT | Facility: CLINIC | Age: 39
End: 2024-10-28
Payer: MEDICAID

## 2024-10-28 VITALS
SYSTOLIC BLOOD PRESSURE: 126 MMHG | TEMPERATURE: 98.2 F | HEIGHT: 65 IN | HEART RATE: 73 BPM | RESPIRATION RATE: 20 BRPM | BODY MASS INDEX: 34.95 KG/M2 | DIASTOLIC BLOOD PRESSURE: 74 MMHG | OXYGEN SATURATION: 98 % | WEIGHT: 209.8 LBS

## 2024-10-28 DIAGNOSIS — E66.812 OBESITY, CLASS II, BMI 35-39.9: Primary | ICD-10-CM

## 2024-10-28 DIAGNOSIS — Z71.3 ENCOUNTER FOR WEIGHT LOSS COUNSELING: ICD-10-CM

## 2024-10-28 PROCEDURE — 99214 OFFICE O/P EST MOD 30 MIN: CPT | Performed by: PHYSICIAN ASSISTANT

## 2024-10-28 PROCEDURE — 1159F MED LIST DOCD IN RCRD: CPT | Performed by: PHYSICIAN ASSISTANT

## 2024-10-28 PROCEDURE — 1160F RVW MEDS BY RX/DR IN RCRD: CPT | Performed by: PHYSICIAN ASSISTANT

## 2024-10-28 RX ORDER — PHENTERMINE HYDROCHLORIDE 37.5 MG/1
37.5 TABLET ORAL
Qty: 28 TABLET | Refills: 0 | Status: SHIPPED | OUTPATIENT
Start: 2024-10-28

## 2024-10-28 RX ORDER — FLUTICASONE PROPIONATE 50 MCG
2 SPRAY, SUSPENSION (ML) NASAL AS NEEDED
COMMUNITY

## 2024-10-28 RX ORDER — PHENTERMINE HYDROCHLORIDE 37.5 MG/1
37.5 TABLET ORAL
Qty: 28 TABLET | OUTPATIENT
Start: 2024-10-28

## 2024-10-28 NOTE — PROGRESS NOTES
"[facility] Bariatric Surgery  [address]  [address]  [facility] [id number] [phone]      Patient Name:  Beverly Giron.  :  1985      Date of Visit: 10/28/2024        Reason for Visit:  MWM followup  - almost 18 months postop       HPI:  Beverly Giron is a 39 y.o. female s/p LSG/HHR 23 w/ , s/p urgent early recurrent paraesophageal hernia repair with falciform ligament wrap 23 w/ Dr. Canada     LOV 24 w/ Dr. Muniz - \"Reports hunger and cravings are improving.    Eats less, has less interest in higher calorie foods.  c/o constipation (BM every 4 days).  PCP rx something, but she didn't take.\"    Taking Topamax (for migraines), phentermine (1 tab @8am), and naltrexone/bupropion.    Has been off Phentermine for the last week, noticed increased cravings and less energy w/out the medication.  Prefers taking 1 full tablet rather than 1/2 tablets, helped more, less insomnia.     Not currently tracking intake, but being mindful of eating 3 meals/day and prioritizing protein.  Has not been exercising consistently, but works from home and trying to incorporate more exercise during the work day.       Last labs 24 - acceptable.  Taking MVI + Vit D 2000 daily.      ______    [facility] start weight: 225 lbs.      Current weight:  209 lbs w/ body fat 43 %.     Total weight loss:  16 lbs.   Total % loss:  7.1 %     ______       Presurgery weight:  280 pounds.  Lowest weight:  209 lbs.  Today's weight is 95.2 kg (209 lb 12.8 oz) pounds, today's Body mass index is 35.46 kg/m²., and weight loss since surgery is 71 pounds.        Past Medical History:   Diagnosis Date    Anxiety and depression     Carpal tunnel syndrome     Elevated LDL cholesterol level     Former smoker     Frequent headaches     Genital herpes     GERD (gastroesophageal reflux disease)     Heart murmur     Heartburn     Tums PRN; EGD Dr. Canada     Hiatal hernia with gastroesophageal " reflux     EGD Dr. Canada     History of DVT of lower extremity     following partial hysterectomy- believed to be provoked. Following with heme/onc    History of partial hysterectomy     HPV (human papilloma virus) infection     Hx of eye surgery     Hx of ovarian cyst     Low back pain     no steroids. PRN NSAIDs    Migraines     PRN flexeril and nsaids    Mitral regurgitation     Mild     (normal spontaneous vaginal delivery)     x 3, 1st two and 4th deliveries.  No complics    Varicose veins of both lower extremities     Venous insufficiency      Past Surgical History:   Procedure Laterality Date     SECTION N/A     x 3. 3rd and last 2 deliveries    ECHO - CONVERTED  2022    EF 60%. Trace -Mild MR. LA- 4.2    ENDOSCOPY N/A 2023    Procedure: ESOPHAGOGASTRODUODENOSCOPY;  Surgeon: Jovanny Canada MD;  Location:  [facility];  Service: Bariatric;  Laterality: N/A;  scope     EYE SURGERY Bilateral 1995    GASTRECTOMY N/A 2023    Procedure: LAPAROSCOPIC GASTRECTOMY, HIATAL HERNIA REPAIR LAPAROSCOPIC;  Surgeon: Jovanny Canada MD;  Location:  [facility];  Service: Bariatric;  Laterality: N/A;  hiatal hernia time: [id number]    LAPAROSCOPIC TUBAL LIGATION      PARAESOPHAGEAL HERNIA REPAIR N/A 2023    Procedure: EARLY RECURRENT PARAESOPHAGEAL HERNIA REPAIR LAPAROSCOPIC;  Surgeon: Jovanny Canada MD;  Location:  [facility];  Service: General;  Laterality: N/A;    SUBTOTAL HYSTERECTOMY      laparoscopic; still has ovaries, complicated by left DVT    US VENOUS EXTREMITY UNILATERAL  2022    Normal, no DVT    US VENOUS EXTREMITY UNILATERAL  02/10/2022    Partially occlusive superficial thrombophlebitis greater saphenous vein    US VENOUS EXTREMITY UNILATERAL  2021    Small amount residual thrombus greater saphenous vein    US VENOUS EXTREMITY UNILATERAL  2021    Superficial thrombosis greater saphenous vein    US VENOUS EXTREMITY UNILATERAL  03/10/2021     Occlusive extensive thrombus within the greater saphenous vein from upper calf throughout the thigh    VEIN LIGATION AND STRIPPING Bilateral      Outpatient Medications Marked as Taking for the 10/28/24 encounter (Office Visit) with Christina Villafana PA   Medication Sig Dispense Refill    buPROPion SR (Wellbutrin SR) 150 MG 12 hr tablet Take 1 tablet by mouth 2 (Two) Times a Day. 60 tablet 2    CRANBERRY PO Take 500 mg by mouth Daily.      cyclobenzaprine (FLEXERIL) 10 MG tablet Take 1 tablet by mouth 2 (Two) Times a Day As Needed for Muscle Spasms.      docusate sodium (COLACE) 250 MG capsule Take 1 capsule by mouth Daily As Needed for Constipation.      escitalopram (LEXAPRO) 10 MG tablet Take 1 tablet by mouth Daily.      fluticasone (FLONASE) 50 MCG/ACT nasal spray Administer 2 sprays into the nostril(s) as directed by provider As Needed for Rhinitis.      hydrOXYzine pamoate (VISTARIL) 25 MG capsule Take 1 capsule by mouth 3 (Three) Times a Day As Needed.      loratadine (CLARITIN) 10 MG tablet Take 1 tablet by mouth Daily.      multivitamin with minerals tablet tablet Take 1 tablet by mouth Daily.      naltrexone (DEPADE) 50 MG tablet TAKE ONE HALF TABLET BY MOUTH DAILY FOR 7 DAYS ORUNTIL NAUSEASUBSIDES. THEN TAKE ONE HALF TABLET BY MOUTH TWO TIMES A DAY (Patient taking differently: Half a tablet twice a day) 30 tablet 3    Probiotic Product (PROBIOTIC PO) Take  by mouth Daily.      SUMAtriptan (IMITREX) 50 MG tablet Take 1 tablet by mouth Every 2 (Two) Hours As Needed for Migraine. Take one tablet at onset of headache. May repeat dose one time in 2 hours if headache not relieved.      topiramate (TOPAMAX) 50 MG tablet Daily.      Vitamin D, Cholecalciferol, 50 MCG (2000 UT) capsule Take  by mouth Daily.       Allergies   Allergen Reactions    Morphine Hives and Itching       Social History     Socioeconomic History    Marital status:    Tobacco Use    Smoking status: Former     Current packs/day:  "0.00     Average packs/day: 1 pack/day for 10.0 years (10.0 ttl pk-yrs)     Types: Cigarettes     Start date: 2012     Quit date: 2022     Years since quittin.5    Smokeless tobacco: Never    Tobacco comments:     she had stopped in 2017 for a few years then restarted 10/2021. Has stopped again 2022.   Vaping Use    Vaping status: Never Used   Substance and Sexual Activity    Alcohol use: Not Currently     Comment: occasionally    Drug use: Never    Sexual activity: Yes     Partners: Male     Birth control/protection: Tubal ligation, Hysterectomy, Partner of same sex     Social History     Social History Narrative    Patient is from [place], [place]. She is  with 5 children. She works full time at [employer] as a ..         /74 (BP Location: Left arm, Patient Position: Sitting)   Pulse 73   Temp 98.2 °F (36.8 °C)   Resp 20   Ht 163.8 cm (64.5\")   Wt 95.2 kg (209 lb 12.8 oz)   SpO2 98%   BMI 35.46 kg/m²     Physical Exam  Constitutional:       Appearance: She is well-developed.   Eyes:      General: No scleral icterus.  Cardiovascular:      Rate and Rhythm: Normal rate and regular rhythm.   Pulmonary:      Effort: Pulmonary effort is normal.   Abdominal:      Palpations: Abdomen is soft.      Tenderness: There is no abdominal tenderness.      Hernia: No hernia is present.   Musculoskeletal:         General: Normal range of motion.   Skin:     General: Skin is warm and dry.      Findings: No rash.   Neurological:      Mental Status: She is alert.   Psychiatric:         Behavior: Behavior normal. Behavior is cooperative.         Judgment: Judgment normal.           Assessment:  almost 18 months s/p LSG/HHR 23 w/ , s/p urgent early recurrent paraesophageal hernia repair with falciform ligament wrap 23 w/ Dr. Canada         ICD-10-CM ICD-9-CM   1. Obesity, Class II, BMI 35-39.9  E66.812 278.00   2. Encounter for weight loss counseling  Z71.3 V65.3         Plan:  " I have instructed the patient to continue with pursuit of medical weight loss as a part of this program. Patient does meet criteria for use of anorectics at this time as BMI >30 , body fat percentage >30%, and this medication is indicated for LONG TERM use for management of obesity.     The current plan for this month includes:   - Continue to work on lifestyle behavioral changes.  - Continue nutrition focus - start tracking intake.   - Increase protein to 100g/day.  - Increase routine exercise w/ strength training.  - Continue Topamax, Wellbutrin, Naltrexone, Phentermine.        The patient was instructed to follow up in 1 month, sooner if needed.       TIM Gutierrez

## 2024-11-25 ENCOUNTER — TELEMEDICINE (OUTPATIENT)
Dept: BARIATRICS/WEIGHT MGMT | Facility: CLINIC | Age: 39
End: 2024-11-25
Payer: MEDICAID

## 2024-11-25 VITALS — WEIGHT: 209 LBS | BODY MASS INDEX: 35.32 KG/M2

## 2024-11-25 DIAGNOSIS — I82.4Y9 DEEP VEIN THROMBOSIS (DVT) OF PROXIMAL LOWER EXTREMITY, UNSPECIFIED CHRONICITY, UNSPECIFIED LATERALITY: ICD-10-CM

## 2024-11-25 DIAGNOSIS — R12 HEARTBURN: ICD-10-CM

## 2024-11-25 DIAGNOSIS — F32.A ANXIETY AND DEPRESSION: ICD-10-CM

## 2024-11-25 DIAGNOSIS — G43.009 MIGRAINE WITHOUT AURA AND WITHOUT STATUS MIGRAINOSUS, NOT INTRACTABLE: ICD-10-CM

## 2024-11-25 DIAGNOSIS — E66.812 OBESITY, CLASS II, BMI 35-39.9: Primary | ICD-10-CM

## 2024-11-25 DIAGNOSIS — F41.9 ANXIETY AND DEPRESSION: ICD-10-CM

## 2024-11-25 PROCEDURE — 99213 OFFICE O/P EST LOW 20 MIN: CPT | Performed by: SURGERY

## 2024-11-25 PROCEDURE — 1159F MED LIST DOCD IN RCRD: CPT | Performed by: SURGERY

## 2024-11-25 PROCEDURE — 1160F RVW MEDS BY RX/DR IN RCRD: CPT | Performed by: SURGERY

## 2024-11-25 RX ORDER — PHENTERMINE HYDROCHLORIDE 37.5 MG/1
37.5 TABLET ORAL
Qty: 28 TABLET | Refills: 0 | Status: SHIPPED | OUTPATIENT
Start: 2024-11-25

## 2024-11-25 RX ORDER — LACTOBACILLUS RHAMNOSUS GG 10B CELL
1 CAPSULE ORAL DAILY
COMMUNITY
Start: 2024-10-29

## 2024-11-25 NOTE — PROGRESS NOTES
[facility] Bariatric Surgery  [address]  [address]  [facility] [id number] [phone]        Patient Name: Beverly Giron.  YOB: 1985      Date of Visit: 2024      Reason for Visit:  MWM    HPI:  Beverly iGron is a 39 y.o. female s/p LSG/HHR 23 w/ , s/p urgent early recurrent paraesophageal hernia repair with falciform ligament wrap 23 w/ Dr. Canada     Reports she struggles with sweets this month.  Wants to snack instead of eat meals.  Halloween was a temptation, and stress also leads to eating the wrong foods.    She was trying to take phentermine by itself in the morning, then take naltrexone and bupropion at a later time.    Biggest struggles is evening/nighttime with eating.      Does not track intake.    Protein: ?  Calories: ?    Relates that she puts herself last.      Does not currently exercise.      Past Medical History:   Diagnosis Date    Anxiety and depression     Carpal tunnel syndrome     Elevated LDL cholesterol level     Former smoker     Frequent headaches     Genital herpes     GERD (gastroesophageal reflux disease)     Heart murmur     Heartburn     Tums PRN; EGD Dr. Canada     Hiatal hernia with gastroesophageal reflux     EGD Dr. Canada     History of DVT of lower extremity     following partial hysterectomy- believed to be provoked. Following with heme/onc    History of partial hysterectomy     HPV (human papilloma virus) infection     Hx of eye surgery     Hx of ovarian cyst     Low back pain     no steroids. PRN NSAIDs    Migraines     PRN flexeril and nsaids    Mitral regurgitation     Mild     (normal spontaneous vaginal delivery)     x 3, 1st two and 4th deliveries.  No complics    Varicose veins of both lower extremities     Venous insufficiency      Past Surgical History:   Procedure Laterality Date     SECTION N/A     x 3. 3rd and last 2 deliveries    ECHO - CONVERTED  2022     EF 60%. Trace -Mild MR. GUTIÉRREZ- 4.2    ENDOSCOPY N/A 2023    Procedure: ESOPHAGOGASTRODUODENOSCOPY;  Surgeon: Jovanny Canada MD;  Location:  [facility];  Service: Bariatric;  Laterality: N/A;  scope     EYE SURGERY Bilateral 1995    GASTRECTOMY N/A 2023    Procedure: LAPAROSCOPIC GASTRECTOMY, HIATAL HERNIA REPAIR LAPAROSCOPIC;  Surgeon: Jovanny Canada MD;  Location:  [facility];  Service: Bariatric;  Laterality: N/A;  hiatal hernia time: [phone]    LAPAROSCOPIC TUBAL LIGATION      PARAESOPHAGEAL HERNIA REPAIR N/A 2023    Procedure: EARLY RECURRENT PARAESOPHAGEAL HERNIA REPAIR LAPAROSCOPIC;  Surgeon: Jovanny Canada MD;  Location:  [facility];  Service: General;  Laterality: N/A;    SUBTOTAL HYSTERECTOMY      laparoscopic; still has ovaries, complicated by left DVT    US VENOUS EXTREMITY UNILATERAL  2022    Normal, no DVT    US VENOUS EXTREMITY UNILATERAL  02/10/2022    Partially occlusive superficial thrombophlebitis greater saphenous vein    US VENOUS EXTREMITY UNILATERAL  2021    Small amount residual thrombus greater saphenous vein    US VENOUS EXTREMITY UNILATERAL  2021    Superficial thrombosis greater saphenous vein    US VENOUS EXTREMITY UNILATERAL  03/10/2021    Occlusive extensive thrombus within the greater saphenous vein from upper calf throughout the thigh    VEIN LIGATION AND STRIPPING Bilateral      No outpatient medications have been marked as taking for the 24 encounter (Telemedicine) with Jazmín Muniz MD.     Allergies   Allergen Reactions    Morphine Hives and Itching       Social History     Socioeconomic History    Marital status:    Tobacco Use    Smoking status: Former     Current packs/day: 0.00     Average packs/day: 1 pack/day for 10.0 years (10.0 ttl pk-yrs)     Types: Cigarettes     Start date: 2012     Quit date: 2022     Years since quittin.6    Smokeless tobacco: Never    Tobacco comments:     she had  stopped in 2017 for a few years then restarted 10/2021. Has stopped again 4/2022.   Vaping Use    Vaping status: Never Used   Substance and Sexual Activity    Alcohol use: Not Currently     Comment: occasionally    Drug use: Never    Sexual activity: Yes     Partners: Male     Birth control/protection: Tubal ligation, Hysterectomy, Partner of same sex     MWM start weight: 225  LOV weight 209      There were no vitals filed for this visit.  Weight 94.8 kg (209 lb)  Body mass index is 35.32 kg/m².    Physical Exam  Constitutional:       General: She is not in acute distress.     Appearance: Normal appearance. She is not ill-appearing.   HENT:      Head: Normocephalic and atraumatic.      Nose: Nose normal.   Eyes:      General: No scleral icterus.     Extraocular Movements: Extraocular movements intact.      Conjunctiva/sclera: Conjunctivae normal.      Pupils: Pupils are equal, round, and reactive to light.   Pulmonary:      Effort: Pulmonary effort is normal. No respiratory distress.   Skin:     Coloration: Skin is not pale.   Neurological:      Mental Status: She is alert and oriented to person, place, and time.   Psychiatric:         Mood and Affect: Mood normal.         Behavior: Behavior normal.           Assessment:      ICD-10-CM ICD-9-CM   1. Obesity, Class II, BMI 35-39.9  E66.812 278.00   2. Migraine without aura and without status migrainosus, not intractable  G43.009 346.10   3. Deep vein thrombosis (DVT) of proximal lower extremity, unspecified chronicity, unspecified laterality  I82.4Y9 453.41   4. Heartburn  R12 787.1   5. Anxiety and depression  F41.9 300.00    F32.A 311       Plan:  Continue w/ good food choices and healthy habits.  Encouraged to focus on high protein, low carb.  Continue routine exercise.  Routine labs ordered.  Continue current vitamin regimen w/ adjustments pending lab results.  Call w/ issues/concerns.  RTC sooner w/ problems.     I have instructed the patient to continue with  pursuit of medical weight loss as a part of this program. Patient does meet criteria for use of anorectics at this time as BMI >30 , body fat percentage >30%, is not at treatment goal, and this medication is indicated for LONG TERM use for management of obesity.     The current plan for this month includes:   - Weight loss goal 4-6lbs this month  - Continue to work on lifestyle behavioral changes  - Continue nutrition focus  - Adjust macronutrients as discussed. Bring food journal to next visit for review  - Continue to prioritize protein, fiber, and hydration.       Recommend taking phentermine 8-10 hours before planned bedtime to cover nighttime hunger.  Continue Topamax QHS, bupropion/naltrexone, and phentermine.    I think she needs to focus on structure, tracking.  Opportunity to get more protein at breakfast (not just a banana), plan her nighttime snack, and make lunch a high protein meal.    The patient presents today for telehealth service.  The service was conducted via HIPAA compliant Epic video platform.    The provider is located at her home address.  The patient is located at home in [place] and stated that they are in a secure environment for the session.  The patient's condition being diagnosed/treated is appropriate for telemedicine.       The use of a video visit has been reviewed with the patient and verbal informed consent has been obtained.            The patient was instructed to follow up in 1 month .

## 2024-12-25 DIAGNOSIS — E66.812 OBESITY, CLASS II, BMI 35-39.9: ICD-10-CM

## 2024-12-31 RX ORDER — BUPROPION HYDROCHLORIDE 150 MG/1
150 TABLET, EXTENDED RELEASE ORAL 2 TIMES DAILY
Qty: 60 TABLET | Refills: 2 | Status: SHIPPED | OUTPATIENT
Start: 2024-12-31

## 2024-12-31 RX ORDER — PHENTERMINE HYDROCHLORIDE 37.5 MG/1
37.5 TABLET ORAL
Qty: 28 TABLET | Refills: 0 | Status: SHIPPED | OUTPATIENT
Start: 2024-12-31

## 2025-01-06 RX ORDER — NALTREXONE HYDROCHLORIDE 50 MG/1
TABLET, FILM COATED ORAL
Qty: 30 TABLET | Refills: 3 | Status: SHIPPED | OUTPATIENT
Start: 2025-01-06

## 2025-01-31 DIAGNOSIS — E66.812 OBESITY, CLASS II, BMI 35-39.9: ICD-10-CM

## 2025-02-10 DIAGNOSIS — E66.812 OBESITY, CLASS II, BMI 35-39.9: ICD-10-CM

## 2025-02-10 RX ORDER — PHENTERMINE HYDROCHLORIDE 37.5 MG/1
37.5 TABLET ORAL
Qty: 28 TABLET | Refills: 0 | OUTPATIENT
Start: 2025-02-10

## 2025-02-10 RX ORDER — PHENTERMINE HYDROCHLORIDE 37.5 MG/1
37.5 TABLET ORAL
Qty: 28 TABLET | Refills: 0 | Status: CANCELLED | OUTPATIENT
Start: 2025-02-10

## 2025-02-10 RX ORDER — NALTREXONE HYDROCHLORIDE 50 MG/1
TABLET, FILM COATED ORAL
Qty: 30 TABLET | Refills: 3 | Status: CANCELLED | OUTPATIENT
Start: 2025-02-10

## 2025-02-10 NOTE — TELEPHONE ENCOUNTER
Rx Refill Note  Requested Prescriptions     Pending Prescriptions Disp Refills    naltrexone (DEPADE) 50 MG tablet 30 tablet 3    phentermine (ADIPEX-P) 37.5 MG tablet 28 tablet 0     Sig: Take 1 tablet by mouth Every Morning Before Breakfast.      Last office visit with prescribing clinician: 10/28/2024   Last telemedicine visit with prescribing clinician: 11/25/2024   Next office visit with prescribing clinician: 2/25/2025                         Would you like a call back once the refill request has been completed: [x] Yes [] No    If the office needs to give you a call back, can they leave a voicemail: [x] Yes [] No    Dianna Branch MA  02/10/25, 08:34 EST

## 2025-03-07 RX ORDER — PHENTERMINE HYDROCHLORIDE 37.5 MG/1
37.5 TABLET ORAL
Qty: 28 TABLET | OUTPATIENT
Start: 2025-03-07

## 2025-03-10 ENCOUNTER — OFFICE VISIT (OUTPATIENT)
Dept: BARIATRICS/WEIGHT MGMT | Facility: CLINIC | Age: 40
End: 2025-03-10
Payer: MEDICAID

## 2025-03-10 VITALS
SYSTOLIC BLOOD PRESSURE: 122 MMHG | HEART RATE: 77 BPM | BODY MASS INDEX: 34.45 KG/M2 | TEMPERATURE: 97.3 F | WEIGHT: 206.8 LBS | DIASTOLIC BLOOD PRESSURE: 78 MMHG | RESPIRATION RATE: 18 BRPM | OXYGEN SATURATION: 98 % | HEIGHT: 65 IN

## 2025-03-10 DIAGNOSIS — E66.811 OBESITY, CLASS I, BMI 30-34.9: Primary | ICD-10-CM

## 2025-03-10 DIAGNOSIS — Z71.3 ENCOUNTER FOR WEIGHT LOSS COUNSELING: ICD-10-CM

## 2025-03-10 PROCEDURE — 1160F RVW MEDS BY RX/DR IN RCRD: CPT | Performed by: PHYSICIAN ASSISTANT

## 2025-03-10 PROCEDURE — 1159F MED LIST DOCD IN RCRD: CPT | Performed by: PHYSICIAN ASSISTANT

## 2025-03-10 PROCEDURE — 99214 OFFICE O/P EST MOD 30 MIN: CPT | Performed by: PHYSICIAN ASSISTANT

## 2025-03-10 RX ORDER — NALTREXONE HYDROCHLORIDE 50 MG/1
25 TABLET, FILM COATED ORAL 2 TIMES DAILY
Qty: 30 TABLET | Refills: 0 | Status: SHIPPED | OUTPATIENT
Start: 2025-03-10

## 2025-03-10 RX ORDER — BUPROPION HYDROCHLORIDE 150 MG/1
150 TABLET, EXTENDED RELEASE ORAL 2 TIMES DAILY
Qty: 180 TABLET | OUTPATIENT
Start: 2025-03-10

## 2025-03-10 RX ORDER — BUPROPION HYDROCHLORIDE 150 MG/1
150 TABLET, EXTENDED RELEASE ORAL 2 TIMES DAILY
Qty: 60 TABLET | Refills: 0 | Status: SHIPPED | OUTPATIENT
Start: 2025-03-10

## 2025-03-10 NOTE — PROGRESS NOTES
[facility]  [address]  [address]  [facility] [id number] [phone]      Patient Name:  Beverly Giron.  :  1985      Date of Visit: 03/10/2025        Reason for Visit:  MWM followup  - almost 2 years postop       HPI:  Beverly Giron is a 39 y.o. female s/p LSG/HHR 23 w/ , s/p urgent early recurrent paraesophageal hernia repair with falciform ligament wrap 23 w/ Dr. Canada     Ran out of medication for several months - had car trouble, lives in [place], missed appts.      Noticeably struggled w/ hunger and sweet cravings when off medications.      RX regimen: Topamax (for migraines), phentermine (1 tab @8am, prefers rather than 1/2 tabs, says less insomnia), and naltrexone/bupropion BID.    Diet Recall:   B -  sausage/egg wrap ([employer]) w/ thuan white chocolate mocha  D - chicken wing w/ shrimp/chicken alredo  S - honey bun, ginger ale    Admittedly needs to drink more water.     Stays active, but not exercising routinely.       Last labs 24 - acceptable.  Taking MVI + Vit D 2000 daily.      ______    MWM start weight: 225 lbs.  (2024)    Current weight:  206 lbs w/ body fat 44 %.     Total weight loss:  19 lbs.   Total % loss:  7.1 %     ______       Presurgery weight:  280 pounds.  Lowest weight:  209 lbs.  Today's weight is 93.8 kg (206 lb 12.8 oz) pounds, today's Body mass index is 34.95 kg/m²., and weight loss since surgery is 74 pounds.        Past Medical History:   Diagnosis Date    Anxiety and depression     Carpal tunnel syndrome     Elevated LDL cholesterol level     Former smoker     Frequent headaches     Genital herpes     Heart murmur     Heartburn     Tums PRN; EGD Dr. Canada     Hiatal hernia with gastroesophageal reflux     EGD Dr. Canada     History of DVT of lower extremity     following partial hysterectomy- believed to be provoked. Following with heme/onc    History of partial hysterectomy      HPV (human papilloma virus) infection     Hx of eye surgery     Hx of ovarian cyst     Low back pain     no steroids. PRN NSAIDs    Migraines     PRN flexeril and nsaids    Mitral regurgitation     Mild     (normal spontaneous vaginal delivery)     x 3, 1st two and 4th deliveries.  No complics    Varicose veins of both lower extremities     Venous insufficiency      Past Surgical History:   Procedure Laterality Date     SECTION N/A     x 3. 3rd and last 2 deliveries    ECHO - CONVERTED  2022    EF 60%. Trace -Mild MR. LA- 4.2    ENDOSCOPY N/A 2023    Procedure: ESOPHAGOGASTRODUODENOSCOPY;  Surgeon: Jovanny Canada MD;  Location:  [facility];  Service: Bariatric;  Laterality: N/A;  scope     EYE SURGERY Bilateral 1995    GASTRECTOMY N/A 2023    Procedure: LAPAROSCOPIC GASTRECTOMY, HIATAL HERNIA REPAIR LAPAROSCOPIC;  Surgeon: Jovanny Canada MD;  Location:  [facility];  Service: Bariatric;  Laterality: N/A;  hiatal hernia time: [phone]    LAPAROSCOPIC TUBAL LIGATION      PARAESOPHAGEAL HERNIA REPAIR N/A 2023    Procedure: EARLY RECURRENT PARAESOPHAGEAL HERNIA REPAIR LAPAROSCOPIC;  Surgeon: Jovanny Canada MD;  Location:  [facility];  Service: General;  Laterality: N/A;    SUBTOTAL HYSTERECTOMY      laparoscopic; still has ovaries, complicated by left DVT    US VENOUS EXTREMITY UNILATERAL  2022    Normal, no DVT    US VENOUS EXTREMITY UNILATERAL  02/10/2022    Partially occlusive superficial thrombophlebitis greater saphenous vein    US VENOUS EXTREMITY UNILATERAL  2021    Small amount residual thrombus greater saphenous vein    US VENOUS EXTREMITY UNILATERAL  2021    Superficial thrombosis greater saphenous vein    US VENOUS EXTREMITY UNILATERAL  03/10/2021    Occlusive extensive thrombus within the greater saphenous vein from upper calf throughout the thigh    VEIN LIGATION AND STRIPPING Bilateral      Outpatient Medications Marked as Taking  for the 3/10/25 encounter (Office Visit) with Christina Villafana PA   Medication Sig Dispense Refill    buPROPion SR (WELLBUTRIN SR) 150 MG 12 hr tablet Take 1 tablet by mouth 2 (Two) Times a Day. 60 tablet 0    CRANBERRY PO Take 500 mg by mouth Daily.      cyclobenzaprine (FLEXERIL) 10 MG tablet Take 1 tablet by mouth 2 (Two) Times a Day As Needed for Muscle Spasms.      escitalopram (LEXAPRO) 10 MG tablet Take 1 tablet by mouth Daily.      fluticasone (FLONASE) 50 MCG/ACT nasal spray Administer 2 sprays into the nostril(s) as directed by provider As Needed for Rhinitis.      hydrOXYzine pamoate (VISTARIL) 25 MG capsule Take 1 capsule by mouth 3 (Three) Times a Day As Needed.      Lactobacillus-Inulin ([facility]) capsule Take 1 tablet by mouth Daily.      loratadine (CLARITIN) 10 MG tablet Take 1 tablet by mouth Daily.      multivitamin with minerals tablet tablet Take 1 tablet by mouth Daily.      naltrexone (DEPADE) 50 MG tablet Take 0.5 tablets by mouth 2 (Two) Times a Day. 30 tablet 0    phentermine (ADIPEX-P) 37.5 MG tablet Take 1 tablet by mouth Every Morning Before Breakfast. 28 tablet 0    Probiotic Product (PROBIOTIC PO) Take  by mouth Daily.      SUMAtriptan (IMITREX) 50 MG tablet Take 1 tablet by mouth Every 2 (Two) Hours As Needed for Migraine. Take one tablet at onset of headache. May repeat dose one time in 2 hours if headache not relieved.      topiramate (TOPAMAX) 50 MG tablet Daily.      Vitamin D, Cholecalciferol, 50 MCG (2000 UT) capsule Take  by mouth Daily.      [DISCONTINUED] buPROPion SR (WELLBUTRIN SR) 150 MG 12 hr tablet Take 1 tablet by mouth 2 (Two) Times a Day. 60 tablet 2    [DISCONTINUED] naltrexone (DEPADE) 50 MG tablet Take 1 tablet by mouth Daily. 30 tablet 3     Allergies   Allergen Reactions    Morphine Hives and Itching       Social History     Socioeconomic History    Marital status:    Tobacco Use    Smoking status: Former     Current packs/day: 0.00  "    Average packs/day: 1 pack/day for 10.0 years (10.0 ttl pk-yrs)     Types: Cigarettes     Start date: 2012     Quit date: 2022     Years since quittin.9    Smokeless tobacco: Never    Tobacco comments:     she had stopped in 2017 for a few years then restarted 10/2021. Has stopped again 2022.   Vaping Use    Vaping status: Never Used   Substance and Sexual Activity    Alcohol use: Not Currently     Comment: occasionally    Drug use: Never    Sexual activity: Yes     Partners: Male     Birth control/protection: Tubal ligation, Hysterectomy, Partner of same sex     Social History     Social History Narrative    Patient is from [place], [place]. She is  with 5 children. She works full time at [employer] as a ..         /78 (BP Location: Left arm, Patient Position: Sitting)   Pulse 77   Temp 97.3 °F (36.3 °C)   Resp 18   Ht 163.8 cm (64.5\")   Wt 93.8 kg (206 lb 12.8 oz)   SpO2 98%   BMI 34.95 kg/m²     Physical Exam  Constitutional:       Appearance: She is well-developed.   Eyes:      General: No scleral icterus.  Cardiovascular:      Rate and Rhythm: Normal rate and regular rhythm.   Pulmonary:      Effort: Pulmonary effort is normal.      Breath sounds: Normal breath sounds.   Musculoskeletal:         General: Normal range of motion.   Skin:     General: Skin is warm and dry.      Findings: No rash.   Neurological:      Mental Status: She is alert.   Psychiatric:         Behavior: Behavior normal. Behavior is cooperative.         Judgment: Judgment normal.           Assessment:  almost 2 years s/p LSG/HHR 23 w/ , s/p urgent early recurrent paraesophageal hernia repair with falciform ligament wrap 23 w/ Dr. Canada         ICD-10-CM ICD-9-CM   1. Obesity, Class I, BMI 30-34.9  E66.811 278.00   2. Encounter for weight loss counseling  Z71.3 V65.3       BMI is >= 30 and <35. (Class 1 Obesity). The following options were offered after discussion;: see " plan.        Plan:  I have instructed the patient to continue with pursuit of medical weight loss as a part of this program. Patient does meet criteria for use of anorectics at this time as BMI >30 , body fat percentage >30%, and this medication is indicated for LONG TERM use for management of obesity.     The current plan for this month includes:   - Continue to work on lifestyle behavioral changes.  - Continue nutrition focus - start tracking intake.   - Increase protein to 100g/day.  - Increase routine exercise w/ strength training as able.  - Continue Topamax, Wellbutrin, Naltrexone, Phentermine - meds refilled.        The patient was instructed to follow up in 1 month (televisit), sooner if needed.       TIM Gutierrez      I spent 30 minutes caring for Beverly on this date of service. This time includes time spent by me in the following activities: preparing for the visit, counseling and educating the patient/family/caregiver, documenting information in the medical record, and care coordination

## 2025-03-31 DIAGNOSIS — E66.812 OBESITY, CLASS II, BMI 35-39.9: ICD-10-CM

## 2025-04-14 RX ORDER — PHENTERMINE HYDROCHLORIDE 37.5 MG/1
37.5 TABLET ORAL
Qty: 28 TABLET | Refills: 0 | Status: SHIPPED | OUTPATIENT
Start: 2025-04-14

## 2025-05-09 ENCOUNTER — TELEMEDICINE (OUTPATIENT)
Dept: BARIATRICS/WEIGHT MGMT | Facility: CLINIC | Age: 40
End: 2025-05-09
Payer: MEDICAID

## 2025-05-09 VITALS — BODY MASS INDEX: 34.64 KG/M2 | WEIGHT: 205 LBS

## 2025-05-09 DIAGNOSIS — I83.812 VARICOSE VEINS OF LEFT LOWER EXTREMITY WITH PAIN: ICD-10-CM

## 2025-05-09 DIAGNOSIS — G43.009 MIGRAINE WITHOUT AURA AND WITHOUT STATUS MIGRAINOSUS, NOT INTRACTABLE: ICD-10-CM

## 2025-05-09 DIAGNOSIS — Z51.81 THERAPEUTIC DRUG MONITORING: ICD-10-CM

## 2025-05-09 DIAGNOSIS — E66.811 OBESITY, CLASS I, BMI 30-34.9: Primary | ICD-10-CM

## 2025-05-09 RX ORDER — PHENTERMINE HYDROCHLORIDE 37.5 MG/1
37.5 TABLET ORAL
Qty: 28 TABLET | Refills: 0 | Status: SHIPPED | OUTPATIENT
Start: 2025-05-09

## 2025-05-09 RX ORDER — NALTREXONE HYDROCHLORIDE 50 MG/1
25 TABLET, FILM COATED ORAL 2 TIMES DAILY
Qty: 60 TABLET | Refills: 2 | Status: SHIPPED | OUTPATIENT
Start: 2025-05-09

## 2025-05-09 RX ORDER — BUPROPION HYDROCHLORIDE 150 MG/1
150 TABLET, EXTENDED RELEASE ORAL 2 TIMES DAILY
Qty: 60 TABLET | Refills: 2 | Status: SHIPPED | OUTPATIENT
Start: 2025-05-09

## 2025-05-09 NOTE — PROGRESS NOTES
"[facility]  [address]  [address]  [facility] [id number] [phone]        Patient Name: Beverly Giron.  YOB: 1985      Date of Visit: 05/09/2025      Reason for Visit:  MW    HPI:  Beverly Giron is a 40 y.o. female s/p LSG/HHR 5/4/23 w/ , s/p urgent early recurrent paraesophageal hernia repair with falciform ligament wrap 5/8/23 w/ Dr. Canada       RX regimen: Topamax (for migraines), phentermine (1 tab @8am, prefers rather than 1/2 tabs, says less insomnia), and naltrexone/bupropion BID.    Dooes not track.  Month has felt stressful, 5 kids, illness in the house.  Unsure of protein.  She is willing to have a protein shake at breakfast.  She does reliably drink coffee for breakfast.    Trying to drink more water.  Works from home.  Prepares bottled water at her desk.  Typical lunch: \"random.\"  PBJ to chicken salad to chili or edison crackers and milk.  Typical supper:  cooks. Meat, veg x 2, starch.  Snack: reports doing badly on snacks.  M&Ms throughout the day.    Ran out of phentermine, off x 2 weeks, back on past week.  Noticed sugar cravings worse off phentermine.    [facility] start weight: 225 lbs.  (7/2024)  LOV weight 206  Today's weight is 93 kg (205 lb) pounds, today's  Body mass index is 34.64 kg/m².,       Past Medical History:   Diagnosis Date    Anxiety and depression     Carpal tunnel syndrome     Elevated LDL cholesterol level     Former smoker     Frequent headaches     Genital herpes     Heart murmur     Heartburn     Tums PRN; EGD Dr. Canada 1/23    Hiatal hernia with gastroesophageal reflux     EGD Dr. Canada 1/23    History of DVT of lower extremity     following partial hysterectomy- believed to be provoked. Following with heme/onc    History of partial hysterectomy 2021    HPV (human papilloma virus) infection     Hx of eye surgery     Hx of ovarian cyst     Low back pain     no steroids. PRN NSAIDs    " Migraines     PRN flexeril and nsaids    Mitral regurgitation     Mild     (normal spontaneous vaginal delivery)     x 3, 1st two and 4th deliveries.  No complics    Varicose veins of both lower extremities     Venous insufficiency      Past Surgical History:   Procedure Laterality Date     SECTION N/A     x 3. 3rd and last 2 deliveries    ECHO - CONVERTED  2022    EF 60%. Trace -Mild MR. LA- 4.2    ENDOSCOPY N/A 2023    Procedure: ESOPHAGOGASTRODUODENOSCOPY;  Surgeon: Jovanny Canada MD;  Location:  [facility];  Service: Bariatric;  Laterality: N/A;  scope     EYE SURGERY Bilateral 1995    GASTRECTOMY N/A 2023    Procedure: LAPAROSCOPIC GASTRECTOMY, HIATAL HERNIA REPAIR LAPAROSCOPIC;  Surgeon: Jovanny Canada MD;  Location:  [facility];  Service: Bariatric;  Laterality: N/A;  hiatal hernia time: [phone]    LAPAROSCOPIC TUBAL LIGATION      PARAESOPHAGEAL HERNIA REPAIR N/A 2023    Procedure: EARLY RECURRENT PARAESOPHAGEAL HERNIA REPAIR LAPAROSCOPIC;  Surgeon: Jovanny Canada MD;  Location:  [facility];  Service: General;  Laterality: N/A;    SUBTOTAL HYSTERECTOMY      laparoscopic; still has ovaries, complicated by left DVT    US VENOUS EXTREMITY UNILATERAL  2022    Normal, no DVT    US VENOUS EXTREMITY UNILATERAL  02/10/2022    Partially occlusive superficial thrombophlebitis greater saphenous vein    US VENOUS EXTREMITY UNILATERAL  2021    Small amount residual thrombus greater saphenous vein    US VENOUS EXTREMITY UNILATERAL  2021    Superficial thrombosis greater saphenous vein    US VENOUS EXTREMITY UNILATERAL  03/10/2021    Occlusive extensive thrombus within the greater saphenous vein from upper calf throughout the thigh    VEIN LIGATION AND STRIPPING Bilateral      Outpatient Medications Marked as Taking for the 25 encounter (Telemedicine) with Jazmín Muniz MD   Medication Sig Dispense Refill    buPROPion SR (WELLBUTRIN SR) 150  MG 12 hr tablet Take 1 tablet by mouth 2 (Two) Times a Day. 60 tablet 0    CRANBERRY PO Take 500 mg by mouth Daily.      cyclobenzaprine (FLEXERIL) 10 MG tablet Take 1 tablet by mouth 2 (Two) Times a Day As Needed for Muscle Spasms.      docusate sodium (COLACE) 250 MG capsule Take 1 capsule by mouth Daily As Needed for Constipation.      escitalopram (LEXAPRO) 10 MG tablet Take 1 tablet by mouth Daily.      fluticasone (FLONASE) 50 MCG/ACT nasal spray Administer 2 sprays into the nostril(s) as directed by provider As Needed for Rhinitis.      hydrOXYzine pamoate (VISTARIL) 25 MG capsule Take 1 capsule by mouth 3 (Three) Times a Day As Needed.      Lactobacillus-Inulin ([facility]) capsule Take 1 tablet by mouth Daily.      loratadine (CLARITIN) 10 MG tablet Take 1 tablet by mouth Daily.      multivitamin with minerals tablet tablet Take 1 tablet by mouth Daily.      Probiotic Product (PROBIOTIC PO) Take  by mouth Daily.      SUMAtriptan (IMITREX) 50 MG tablet Take 1 tablet by mouth Every 2 (Two) Hours As Needed for Migraine. Take one tablet at onset of headache. May repeat dose one time in 2 hours if headache not relieved.      topiramate (TOPAMAX) 50 MG tablet Daily.      Vitamin D, Cholecalciferol, 50 MCG (2000 UT) capsule Take  by mouth Daily.      [DISCONTINUED] naltrexone (DEPADE) 50 MG tablet Take 0.5 tablets by mouth 2 (Two) Times a Day. 30 tablet 0     Allergies   Allergen Reactions    Morphine Hives and Itching       Social History     Socioeconomic History    Marital status:    Tobacco Use    Smoking status: Former     Current packs/day: 0.00     Average packs/day: 1 pack/day for 10.0 years (10.0 ttl pk-yrs)     Types: Cigarettes     Start date: 4/1/2012     Quit date: 4/1/2022     Years since quitting: 3.1    Smokeless tobacco: Never    Tobacco comments:     she had stopped in 2017 for a few years then restarted 10/2021. Has stopped again 4/2022.   Vaping Use    Vaping status:  Never Used   Substance and Sexual Activity    Alcohol use: Not Currently     Comment: occasionally    Drug use: Never    Sexual activity: Yes     Partners: Male     Birth control/protection: Tubal ligation, Hysterectomy, Partner of same sex       There were no vitals filed for this visit.  Weight 93 kg (205 lb)  Body mass index is 34.64 kg/m².    Physical Exam  Constitutional:       General: She is not in acute distress.     Appearance: Normal appearance. She is not ill-appearing.   HENT:      Head: Normocephalic and atraumatic.      Nose: Nose normal.   Eyes:      General: No scleral icterus.     Extraocular Movements: Extraocular movements intact.      Conjunctiva/sclera: Conjunctivae normal.      Pupils: Pupils are equal, round, and reactive to light.   Pulmonary:      Effort: Pulmonary effort is normal. No respiratory distress.   Skin:     Coloration: Skin is not pale.   Neurological:      Mental Status: She is alert and oriented to person, place, and time.   Psychiatric:         Mood and Affect: Mood normal.         Behavior: Behavior normal.           Assessment:      ICD-10-CM ICD-9-CM   1. Obesity, Class I, BMI 30-34.9  E66.811 278.00   2. Therapeutic drug monitoring  Z51.81 V58.83   3. Varicose veins of left lower extremity with pain  I83.812 454.8   4. Migraine without aura and without status migrainosus, not intractable  G43.009 346.10       Plan:      I have instructed the patient to continue with pursuit of medical weight loss as a part of this program. Patient does meet criteria for use of anorectics at this time as BMI >30 , body fat percentage >30%, dysmetabolic syndrome, is not at treatment goal, and this medication is indicated for LONG TERM use for management of obesity.       Refill phentermine, naltrexone, and bupropion.      Fix diet deficits systematically.  Protein shake with coffee at breakfast.  Make a healthy lunch that she can eat all week long.    Eat meat first.  No candy for snacks.   Apple n peanut butter.    Start walking 30 min 5x per week.    The current plan for this month includes:   - Weight loss goal 4-6lbs this month  - Continue to work on lifestyle behavioral changes  - Continue nutrition focus  - Adjust macronutrients as discussed. Bring food journal to next visit for review  - Continue to prioritize protein, fiber, and hydration.            I spent 30 minutes caring for Beverly on this date of service. This time includes time spent by me in the following activities: preparing for the visit, reviewing tests, performing a medically appropriate examination and/or evaluation, counseling and educating the patient/family/caregiver, referring and communicating with other health care professionals, documenting information in the medical record, independently interpreting results and communicating that information with the patient/family/caregiver, care coordination, ordering medications, ordering test(s), ordering procedure(s), obtaining a separately obtained history, and reviewing a separately obtained history      The patient was instructed to follow up in 1 month .

## 2025-06-28 DIAGNOSIS — E66.811 OBESITY, CLASS I, BMI 30-34.9: ICD-10-CM

## 2025-07-01 RX ORDER — PHENTERMINE HYDROCHLORIDE 37.5 MG/1
37.5 TABLET ORAL
Qty: 28 TABLET | Refills: 0 | OUTPATIENT
Start: 2025-07-01

## 2025-07-02 DIAGNOSIS — E66.811 OBESITY, CLASS I, BMI 30-34.9: ICD-10-CM

## 2025-07-07 RX ORDER — NALTREXONE HYDROCHLORIDE 50 MG/1
25 TABLET, FILM COATED ORAL 2 TIMES DAILY
Qty: 60 TABLET | Refills: 2 | Status: SHIPPED | OUTPATIENT
Start: 2025-07-07

## 2025-07-07 RX ORDER — BUPROPION HYDROCHLORIDE 150 MG/1
150 TABLET, EXTENDED RELEASE ORAL 2 TIMES DAILY
Qty: 60 TABLET | Refills: 2 | Status: SHIPPED | OUTPATIENT
Start: 2025-07-07

## 2025-07-07 RX ORDER — PHENTERMINE HYDROCHLORIDE 37.5 MG/1
37.5 TABLET ORAL
Qty: 28 TABLET | Refills: 0 | Status: SHIPPED | OUTPATIENT
Start: 2025-07-07

## 2025-08-12 DIAGNOSIS — E66.811 OBESITY, CLASS I, BMI 30-34.9: ICD-10-CM

## 2025-08-12 RX ORDER — PHENTERMINE HYDROCHLORIDE 37.5 MG/1
37.5 TABLET ORAL
Qty: 28 TABLET | Refills: 0 | Status: CANCELLED | OUTPATIENT
Start: 2025-08-12

## 2025-08-14 DIAGNOSIS — E66.811 OBESITY, CLASS I, BMI 30-34.9: ICD-10-CM

## 2025-08-14 RX ORDER — PHENTERMINE HYDROCHLORIDE 37.5 MG/1
37.5 TABLET ORAL
Qty: 28 TABLET | Refills: 0 | OUTPATIENT
Start: 2025-08-14

## (undated) DEVICE — ENDOPATH XCEL UNIVERSAL TROCAR STABLILITY SLEEVES: Brand: ENDOPATH XCEL

## (undated) DEVICE — GLV SURG SENSICARE PI MIC PF SZ8.5 LF STRL

## (undated) DEVICE — LAPAROSCOPIC SMOKE FILTRATION SYSTEM: Brand: PALL LAPAROSHIELD® PLUS LAPAROSCOPIC SMOKE FILTRATION SYSTEM

## (undated) DEVICE — ENDOPATH XCEL BLADELESS TROCARS WITH STABILITY SLEEVES: Brand: ENDOPATH XCEL

## (undated) DEVICE — SYR CONTRL PRESS/LO FIX/M/LL W/THMB/RNG 10ML

## (undated) DEVICE — NDL HYPO ECLPS SFTY 22G 1 1/2IN

## (undated) DEVICE — Device

## (undated) DEVICE — ENDOPATH 5MM ENDOSCOPIC BLUNT TIP DISSECTORS (12 POUCHES CONTAINING 3 DISSECTORS EACH): Brand: ENDOPATH

## (undated) DEVICE — NEEDLE, QUINCKE 22GX3.5": Brand: MEDLINE INDUSTRIES, INC.

## (undated) DEVICE — ANTIBACTERIAL VIOLET BRAIDED (POLYGLACTIN 910), SYNTHETIC ABSORBABLE SUTURE: Brand: COATED VICRYL

## (undated) DEVICE — [HIGH FLOW INSUFFLATOR,  DO NOT USE IF PACKAGE IS DAMAGED,  KEEP DRY,  KEEP AWAY FROM SUNLIGHT,  PROTECT FROM HEAT AND RADIOACTIVE SOURCES.]: Brand: PNEUMOSURE

## (undated) DEVICE — GOWN,NON-REINFORCED,SIRUS,SET IN SLV,XXL: Brand: MEDLINE

## (undated) DEVICE — ENSEAL LAPAROSCOPIC TISSUE SEALER G2 ARTICULATING CURVED JAW FOR USE WITH G2 GENERATOR 5MM DIAMETER 45CM SHAFT LENGTH: Brand: ENSEAL

## (undated) DEVICE — APPL CHLORAPREP TINTED 26ML TEAL

## (undated) DEVICE — SYR LUERLOK 30CC

## (undated) DEVICE — SHT AIR TRANSFR COMFRT GLIDE LAT 40X80IN

## (undated) DEVICE — SUT MONOCRYL PLS ANTIB UND 3/0  PS1 27IN

## (undated) DEVICE — PENROSE DRAIN 18 X .5" SILICONE: Brand: MEDLINE

## (undated) DEVICE — NDL BLNT 18G 1 1/2IN

## (undated) DEVICE — LAPAROVUE VISIBILITY SYSTEM LAPAROSCOPIC SOLUTIONS: Brand: LAPAROVUE

## (undated) DEVICE — ENDOPATH XCEL WITH OPTIVIEW TECHNOLOGY BLADELESS TROCARS WITH STABILITY SLEEVES: Brand: ENDOPATH XCEL OPTIVIEW

## (undated) DEVICE — GLV SURG SENSICARE PI MIC PF SZ9 LF STRL

## (undated) DEVICE — SEALANT WND FIBRIN TISSEEL PREFIL/SYR/PRIMAFZ 10ML

## (undated) DEVICE — PATIENT RETURN ELECTRODE, SINGLE-USE, CONTACT QUALITY MONITORING, ADULT, WITH 9FT CORD, FOR PATIENTS WEIGING OVER 33LBS. (15KG): Brand: MEGADYNE

## (undated) DEVICE — INTENDED USE FOR SURGICAL MARKING ON INTACT SKIN, ALSO PROVIDES A PERMANENT METHOD OF IDENTIFYING OBJECTS IN THE OPERATING ROOM: Brand: WRITESITE® REGULAR TIP SKIN MARKER

## (undated) DEVICE — ADHS SKIN PREMIERPRO EXOFIN TOPICAL HI/VISC .5ML

## (undated) DEVICE — PK BARIATRIC 10

## (undated) DEVICE — NDL HYPO ECLPS SFTY 18G 1 1/2IN

## (undated) DEVICE — APL DUPLOSPRAYER MIS 40CM

## (undated) DEVICE — CONTN FORMALN PREFIL 20ML CA/256

## (undated) DEVICE — TROCAR: Brand: KII FIOS FIRST ENTRY

## (undated) DEVICE — BLANKT WARM UPPR/BDY ARM/OUT 57X196CM

## (undated) DEVICE — UNDRPD COMFRT GLD DRYPAD 36X57IN

## (undated) DEVICE — Device: Brand: STANDARD BOUGIE, 38FR

## (undated) DEVICE — Device: Brand: DEFENDO AIR/WATER/SUCTION AND BIOPSY VALVE

## (undated) DEVICE — TROC STANDARDTROCAR FOR/TITANSGS 19MM DISP STRL

## (undated) DEVICE — CONN TBG Y 5 IN 1 LF STRL

## (undated) DEVICE — CONTN GRAD MEAS TRIANG 32OZ BLK